# Patient Record
Sex: MALE | Race: ASIAN | NOT HISPANIC OR LATINO | Employment: FULL TIME | URBAN - METROPOLITAN AREA
[De-identification: names, ages, dates, MRNs, and addresses within clinical notes are randomized per-mention and may not be internally consistent; named-entity substitution may affect disease eponyms.]

---

## 2017-02-02 ENCOUNTER — GENERIC CONVERSION - ENCOUNTER (OUTPATIENT)
Dept: OTHER | Facility: OTHER | Age: 47
End: 2017-02-02

## 2017-09-02 ENCOUNTER — APPOINTMENT (OUTPATIENT)
Dept: LAB | Facility: CLINIC | Age: 47
End: 2017-09-02
Payer: COMMERCIAL

## 2017-09-02 ENCOUNTER — TRANSCRIBE ORDERS (OUTPATIENT)
Dept: LAB | Facility: CLINIC | Age: 47
End: 2017-09-02

## 2017-09-02 DIAGNOSIS — E78.00 PURE HYPERCHOLESTEROLEMIA: ICD-10-CM

## 2017-09-02 DIAGNOSIS — L50.8 OTHER SPECIFIED URTICARIA: ICD-10-CM

## 2017-09-02 DIAGNOSIS — E55.9 UNSPECIFIED VITAMIN D DEFICIENCY: ICD-10-CM

## 2017-09-02 DIAGNOSIS — O24.319 PRE-EXISTING DIABETES MELLITUS, TREATED WITH ORAL HYPOGLYCEMIC THERAPY, DURING PREGNANCY: ICD-10-CM

## 2017-09-02 DIAGNOSIS — Z13.29 SCREENING FOR THYROID DISORDER: ICD-10-CM

## 2017-09-02 DIAGNOSIS — Z79.4 ENCOUNTER FOR LONG-TERM (CURRENT) USE OF INSULIN (HCC): ICD-10-CM

## 2017-09-02 DIAGNOSIS — Z79.84 PRE-EXISTING DIABETES MELLITUS, TREATED WITH ORAL HYPOGLYCEMIC THERAPY, DURING PREGNANCY: ICD-10-CM

## 2017-09-02 DIAGNOSIS — E11.9 DIABETES MELLITUS WITHOUT COMPLICATION (HCC): ICD-10-CM

## 2017-09-02 DIAGNOSIS — L50.8 OTHER SPECIFIED URTICARIA: Primary | ICD-10-CM

## 2017-09-02 DIAGNOSIS — E53.8 OTHER B-COMPLEX DEFICIENCIES: ICD-10-CM

## 2017-09-02 DIAGNOSIS — Z12.5 SPECIAL SCREENING FOR MALIGNANT NEOPLASM OF PROSTATE: ICD-10-CM

## 2017-09-02 LAB
25(OH)D3 SERPL-MCNC: 5.7 NG/ML (ref 30–100)
ALBUMIN SERPL BCP-MCNC: 4.1 G/DL (ref 3.5–5)
ALP SERPL-CCNC: 102 U/L (ref 46–116)
ALT SERPL W P-5'-P-CCNC: 97 U/L (ref 12–78)
ANION GAP SERPL CALCULATED.3IONS-SCNC: 12 MMOL/L (ref 4–13)
AST SERPL W P-5'-P-CCNC: 43 U/L (ref 5–45)
BILIRUB SERPL-MCNC: 0.5 MG/DL (ref 0.2–1)
BUN SERPL-MCNC: 9 MG/DL (ref 5–25)
C3 SERPL-MCNC: 152 MG/DL (ref 90–180)
C4 SERPL-MCNC: 34 MG/DL (ref 10–40)
CALCIUM SERPL-MCNC: 9.1 MG/DL (ref 8.3–10.1)
CHLORIDE SERPL-SCNC: 100 MMOL/L (ref 100–108)
CHOLEST SERPL-MCNC: 210 MG/DL (ref 50–200)
CO2 SERPL-SCNC: 27 MMOL/L (ref 21–32)
CREAT SERPL-MCNC: 0.81 MG/DL (ref 0.6–1.3)
CREAT UR-MCNC: 180 MG/DL
EST. AVERAGE GLUCOSE BLD GHB EST-MCNC: 197 MG/DL
GFR SERPL CREATININE-BSD FRML MDRD: 106 ML/MIN/1.73SQ M
GLUCOSE P FAST SERPL-MCNC: 176 MG/DL (ref 65–99)
HBA1C MFR BLD: 8.5 % (ref 4.2–6.3)
HDLC SERPL-MCNC: 41 MG/DL (ref 40–60)
LDLC SERPL CALC-MCNC: 137 MG/DL (ref 0–100)
MICROALBUMIN UR-MCNC: 184 MG/L (ref 0–20)
MICROALBUMIN/CREAT 24H UR: 102 MG/G CREATININE (ref 0–30)
POTASSIUM SERPL-SCNC: 3.8 MMOL/L (ref 3.5–5.3)
PROT SERPL-MCNC: 7.6 G/DL (ref 6.4–8.2)
PSA SERPL-MCNC: 0.2 NG/ML (ref 0–4)
SODIUM SERPL-SCNC: 139 MMOL/L (ref 136–145)
T3FREE SERPL-MCNC: 2.86 PG/ML (ref 2.3–4.2)
T4 FREE SERPL-MCNC: 1.19 NG/DL (ref 0.76–1.46)
T4 SERPL-MCNC: 10.6 UG/DL (ref 4.7–13.3)
TRIGL SERPL-MCNC: 162 MG/DL
TSH SERPL DL<=0.05 MIU/L-ACNC: 1.68 UIU/ML (ref 0.36–3.74)
VIT B12 SERPL-MCNC: 539 PG/ML (ref 100–900)

## 2017-09-02 PROCEDURE — 82306 VITAMIN D 25 HYDROXY: CPT

## 2017-09-02 PROCEDURE — 82043 UR ALBUMIN QUANTITATIVE: CPT | Performed by: FAMILY MEDICINE

## 2017-09-02 PROCEDURE — 86038 ANTINUCLEAR ANTIBODIES: CPT

## 2017-09-02 PROCEDURE — 80053 COMPREHEN METABOLIC PANEL: CPT

## 2017-09-02 PROCEDURE — 86200 CCP ANTIBODY: CPT

## 2017-09-02 PROCEDURE — 84439 ASSAY OF FREE THYROXINE: CPT

## 2017-09-02 PROCEDURE — 86337 INSULIN ANTIBODIES: CPT

## 2017-09-02 PROCEDURE — 82607 VITAMIN B-12: CPT

## 2017-09-02 PROCEDURE — 83516 IMMUNOASSAY NONANTIBODY: CPT

## 2017-09-02 PROCEDURE — 84445 ASSAY OF TSI GLOBULIN: CPT

## 2017-09-02 PROCEDURE — 86255 FLUORESCENT ANTIBODY SCREEN: CPT

## 2017-09-02 PROCEDURE — 86162 COMPLEMENT TOTAL (CH50): CPT

## 2017-09-02 PROCEDURE — 83036 HEMOGLOBIN GLYCOSYLATED A1C: CPT

## 2017-09-02 PROCEDURE — 83519 RIA NONANTIBODY: CPT

## 2017-09-02 PROCEDURE — G0103 PSA SCREENING: HCPCS

## 2017-09-02 PROCEDURE — 86341 ISLET CELL ANTIBODY: CPT

## 2017-09-02 PROCEDURE — 82784 ASSAY IGA/IGD/IGG/IGM EACH: CPT

## 2017-09-02 PROCEDURE — 84432 ASSAY OF THYROGLOBULIN: CPT

## 2017-09-02 PROCEDURE — 36415 COLL VENOUS BLD VENIPUNCTURE: CPT

## 2017-09-02 PROCEDURE — 84481 FREE ASSAY (FT-3): CPT

## 2017-09-02 PROCEDURE — 84165 PROTEIN E-PHORESIS SERUM: CPT

## 2017-09-02 PROCEDURE — 86800 THYROGLOBULIN ANTIBODY: CPT

## 2017-09-02 PROCEDURE — 82570 ASSAY OF URINE CREATININE: CPT | Performed by: FAMILY MEDICINE

## 2017-09-02 PROCEDURE — 86430 RHEUMATOID FACTOR TEST QUAL: CPT

## 2017-09-02 PROCEDURE — 80061 LIPID PANEL: CPT

## 2017-09-02 PROCEDURE — 86160 COMPLEMENT ANTIGEN: CPT

## 2017-09-02 PROCEDURE — 84443 ASSAY THYROID STIM HORMONE: CPT

## 2017-09-02 PROCEDURE — 86671 FUNGUS NES ANTIBODY: CPT

## 2017-09-02 PROCEDURE — 86376 MICROSOMAL ANTIBODY EACH: CPT

## 2017-09-03 LAB — THYROPEROXIDASE AB SERPL-ACNC: 17 IU/ML (ref 0–34)

## 2017-09-04 LAB
CCP IGA+IGG SERPL IA-ACNC: 6 UNITS (ref 0–19)
RYE IGE QN: NEGATIVE

## 2017-09-05 LAB
ALBUMIN SERPL ELPH-MCNC: 4.62 G/DL (ref 3.5–5)
ALBUMIN SERPL ELPH-MCNC: 65.1 % (ref 52–65)
ALPHA1 GLOB SERPL ELPH-MCNC: 0.24 G/DL (ref 0.1–0.4)
ALPHA1 GLOB SERPL ELPH-MCNC: 3.4 % (ref 2.5–5)
ALPHA2 GLOB SERPL ELPH-MCNC: 0.73 G/DL (ref 0.4–1.2)
ALPHA2 GLOB SERPL ELPH-MCNC: 10.3 % (ref 7–13)
BAKER'S YEAST IGG QN IA: 42 UNITS (ref 0–50)
BETA GLOB ABNORMAL SERPL ELPH-MCNC: 0.47 G/DL (ref 0.4–0.8)
BETA1 GLOB SERPL ELPH-MCNC: 6.6 % (ref 5–13)
BETA2 GLOB SERPL ELPH-MCNC: 5 % (ref 2–8)
BETA2+GAMMA GLOB SERPL ELPH-MCNC: 0.36 G/DL (ref 0.2–0.5)
CH50 SERPL-ACNC: 63 U/ML (ref 42–60)
CHITOBIOSIDE IGA SERPL IA-ACNC: 18 UNITS (ref 0–90)
ENDOMYSIUM IGA SER QL: NEGATIVE
GAMMA GLOB ABNORMAL SERPL ELPH-MCNC: 0.68 G/DL (ref 0.5–1.6)
GAMMA GLOB SERPL ELPH-MCNC: 9.6 % (ref 12–22)
GLIADIN PEPTIDE IGA SER-ACNC: 7 UNITS (ref 0–19)
GLIADIN PEPTIDE IGG SER-ACNC: 3 UNITS (ref 0–19)
IBD COMMENTS: ABNORMAL
IGA SERPL-MCNC: 118 MG/DL (ref 90–386)
IGG/ALB SER: 1.87 {RATIO} (ref 1.1–1.8)
LAMINARIBIOSIDE IGG SERPL IA-ACNC: 3 UNITS (ref 0–60)
MANNOBIOSIDE IGG SERPL IA-ACNC: 18 UNITS (ref 0–100)
P-ANCA ATYPICAL SER QL IF: POSITIVE
PROT PATTERN SERPL ELPH-IMP: ABNORMAL
PROT SERPL-MCNC: 7.1 G/DL (ref 6.4–8.2)
RHEUMATOID FACT SER QL LA: NEGATIVE
TTG IGA SER-ACNC: <2 U/ML (ref 0–3)
TTG IGG SER-ACNC: 4 U/ML (ref 0–5)

## 2017-09-06 LAB
GAD65 AB SER-ACNC: <5 U/ML (ref 0–5)
PANC ISLET CELL AB TITR SER: NEGATIVE {TITER}
THYROGLOB AB SERPL-ACNC: <1 IU/ML (ref 0–0.9)
THYROGLOB SERPL-MCNC: 10.5 NG/ML (ref 1.4–29.2)

## 2017-09-07 LAB — TSI SER-ACNC: 42 % (ref 0–139)

## 2017-09-10 LAB — INSULIN AB SER-ACNC: <5 UU/ML

## 2017-09-12 ENCOUNTER — GENERIC CONVERSION - ENCOUNTER (OUTPATIENT)
Dept: ENDOCRINOLOGY | Facility: CLINIC | Age: 47
End: 2017-09-12

## 2017-12-05 ENCOUNTER — GENERIC CONVERSION - ENCOUNTER (OUTPATIENT)
Dept: ENDOCRINOLOGY | Facility: CLINIC | Age: 47
End: 2017-12-05

## 2017-12-27 ENCOUNTER — GENERIC CONVERSION - ENCOUNTER (OUTPATIENT)
Dept: ENDOCRINOLOGY | Facility: CLINIC | Age: 47
End: 2017-12-27

## 2019-05-24 ENCOUNTER — OFFICE VISIT (OUTPATIENT)
Dept: URGENT CARE | Facility: CLINIC | Age: 49
End: 2019-05-24
Payer: COMMERCIAL

## 2019-05-24 VITALS
DIASTOLIC BLOOD PRESSURE: 71 MMHG | TEMPERATURE: 98 F | SYSTOLIC BLOOD PRESSURE: 136 MMHG | WEIGHT: 197 LBS | BODY MASS INDEX: 29.18 KG/M2 | HEIGHT: 69 IN | HEART RATE: 98 BPM | RESPIRATION RATE: 14 BRPM | OXYGEN SATURATION: 95 %

## 2019-05-24 DIAGNOSIS — J32.9 RHINOSINUSITIS: Primary | ICD-10-CM

## 2019-05-24 DIAGNOSIS — J06.9 VIRAL UPPER RESPIRATORY TRACT INFECTION: ICD-10-CM

## 2019-05-24 DIAGNOSIS — J31.0 RHINOSINUSITIS: Primary | ICD-10-CM

## 2019-05-24 PROCEDURE — 99213 OFFICE O/P EST LOW 20 MIN: CPT | Performed by: FAMILY MEDICINE

## 2019-05-24 RX ORDER — FLUTICASONE PROPIONATE 50 MCG
2 SPRAY, SUSPENSION (ML) NASAL DAILY
Qty: 1 BOTTLE | Refills: 1 | Status: SHIPPED | OUTPATIENT
Start: 2019-05-24 | End: 2020-07-13

## 2019-05-24 RX ORDER — BROMPHENIRAMINE MALEATE, PSEUDOEPHEDRINE HYDROCHLORIDE, AND DEXTROMETHORPHAN HYDROBROMIDE 2; 30; 10 MG/5ML; MG/5ML; MG/5ML
10 SYRUP ORAL 4 TIMES DAILY PRN
Qty: 118 ML | Refills: 0 | Status: SHIPPED | OUTPATIENT
Start: 2019-05-24 | End: 2020-11-02 | Stop reason: ALTCHOICE

## 2019-06-05 ENCOUNTER — OFFICE VISIT (OUTPATIENT)
Dept: URGENT CARE | Facility: CLINIC | Age: 49
End: 2019-06-05
Payer: COMMERCIAL

## 2019-06-05 VITALS
HEIGHT: 69 IN | TEMPERATURE: 97.7 F | RESPIRATION RATE: 16 BRPM | BODY MASS INDEX: 29.18 KG/M2 | WEIGHT: 197 LBS | DIASTOLIC BLOOD PRESSURE: 71 MMHG | HEART RATE: 82 BPM | SYSTOLIC BLOOD PRESSURE: 134 MMHG

## 2019-06-05 DIAGNOSIS — L50.9 HIVES: Primary | ICD-10-CM

## 2019-06-05 PROCEDURE — 99213 OFFICE O/P EST LOW 20 MIN: CPT | Performed by: NURSE PRACTITIONER

## 2019-06-05 RX ORDER — NIACIN 1000 MG/1
1000 TABLET, EXTENDED RELEASE ORAL
Refills: 3 | COMMUNITY
Start: 2019-04-07 | End: 2020-07-13

## 2019-06-05 RX ORDER — SEMAGLUTIDE 1.34 MG/ML
INJECTION, SOLUTION SUBCUTANEOUS
Refills: 3 | COMMUNITY
Start: 2019-05-11 | End: 2020-10-29

## 2019-06-05 RX ORDER — AMLODIPINE BESYLATE 5 MG/1
TABLET ORAL
Refills: 1 | COMMUNITY
Start: 2019-05-16 | End: 2020-07-13

## 2019-06-05 RX ORDER — FLASH GLUCOSE SENSOR
KIT MISCELLANEOUS
Refills: 5 | COMMUNITY
Start: 2019-05-15 | End: 2020-11-03 | Stop reason: SDUPTHER

## 2019-06-05 RX ORDER — HYDROXYZINE HYDROCHLORIDE 25 MG/1
25 TABLET, FILM COATED ORAL EVERY 6 HOURS PRN
Qty: 10 TABLET | Refills: 0 | Status: SHIPPED | OUTPATIENT
Start: 2019-06-05 | End: 2020-11-03 | Stop reason: ALTCHOICE

## 2019-06-05 RX ORDER — AZITHROMYCIN 250 MG/1
TABLET, FILM COATED ORAL
COMMUNITY
Start: 2019-06-05 | End: 2020-11-03 | Stop reason: ALTCHOICE

## 2019-06-05 RX ORDER — PREDNISONE 10 MG/1
10 TABLET ORAL DAILY
Qty: 21 TABLET | Refills: 0 | Status: SHIPPED | OUTPATIENT
Start: 2019-06-05 | End: 2020-07-13

## 2020-03-01 ENCOUNTER — APPOINTMENT (EMERGENCY)
Dept: RADIOLOGY | Facility: HOSPITAL | Age: 50
End: 2020-03-01
Payer: COMMERCIAL

## 2020-03-01 ENCOUNTER — HOSPITAL ENCOUNTER (EMERGENCY)
Facility: HOSPITAL | Age: 50
Discharge: HOME | End: 2020-03-01
Attending: EMERGENCY MEDICINE | Admitting: EMERGENCY MEDICINE
Payer: COMMERCIAL

## 2020-03-01 VITALS
SYSTOLIC BLOOD PRESSURE: 138 MMHG | HEART RATE: 85 BPM | BODY MASS INDEX: 31.16 KG/M2 | TEMPERATURE: 98 F | RESPIRATION RATE: 18 BRPM | WEIGHT: 210.98 LBS | DIASTOLIC BLOOD PRESSURE: 74 MMHG | OXYGEN SATURATION: 95 %

## 2020-03-01 DIAGNOSIS — M25.521 RIGHT ELBOW PAIN: Primary | ICD-10-CM

## 2020-03-01 PROCEDURE — 99284 EMERGENCY DEPT VISIT MOD MDM: CPT | Performed by: PHYSICIAN ASSISTANT

## 2020-03-01 PROCEDURE — 73080 X-RAY EXAM OF ELBOW: CPT

## 2020-03-01 PROCEDURE — 99283 EMERGENCY DEPT VISIT LOW MDM: CPT

## 2020-03-01 RX ORDER — TRAMADOL HYDROCHLORIDE 50 MG/1
50 TABLET ORAL ONCE
Status: COMPLETED | OUTPATIENT
Start: 2020-03-01 | End: 2020-03-01

## 2020-03-01 RX ORDER — TRAMADOL HYDROCHLORIDE 50 MG/1
50 TABLET ORAL EVERY 6 HOURS PRN
Qty: 8 TABLET | Refills: 0 | Status: SHIPPED | OUTPATIENT
Start: 2020-03-01 | End: 2020-06-22

## 2020-03-01 RX ADMIN — TRAMADOL HYDROCHLORIDE 50 MG: 50 TABLET ORAL at 09:01

## 2020-03-01 NOTE — ED PROVIDER NOTES
History  Chief Complaint   Patient presents with    Elbow Pain     pain and swelling of right elbow X 3-4 days, denies injury       History provided by:  Patient  Elbow Pain   Location:  Elbow  Elbow location:  R elbow  Injury: no    Pain details:     Quality:  Aching    Radiates to:  Does not radiate    Severity:  Moderate    Onset quality:  Gradual    Duration:  3 days    Timing:  Intermittent    Progression:  Worsening  Handedness:  Right-handed  Dislocation: no    Prior injury to area:  No  Relieved by:  None tried  Worsened by: Movement (Palpation)  Ineffective treatments:  None tried  Associated symptoms: stiffness    Associated symptoms: no back pain, no decreased range of motion, no fatigue, no fever, no muscle weakness, no neck pain, no numbness, no swelling and no tingling    Risk factors: no concern for non-accidental trauma, no known bone disorder, no frequent fractures and no recent illness        Prior to Admission Medications   Prescriptions Last Dose Informant Patient Reported? Taking? Continuous Blood Gluc Sensor ([employer] ASHER 14 DAY SENSOR) [facility]  Spouse/Significant Other Yes No   Sig: Use as directed   OZEMPIC 1 MG/DOSE SOPN  Spouse/Significant Other Yes No   Sig: SUBCUTANEOUS 1MG SUBCUTANEOUS ONCE A WEEK  Omalizumab (XOLAIR SC)  Spouse/Significant Other Yes No   Sig: Inject under the skin  amLODIPine (NORVASC) 5 mg tablet  Spouse/Significant Other Yes No   Sig: TAKE 1 TABLET(S) EVERY DAY BY ORAL ROUTE  (FILLABLE 10/29   atorvastatin (LIPITOR) 40 mg tablet  Spouse/Significant Other Yes No   Sig: Take 40 mg by mouth daily     azithromycin (ZITHROMAX) 250 mg tablet  Spouse/Significant Other Yes No   brompheniramine-pseudoephedrine-DM 30-2-10 MG/5ML syrup  Spouse/Significant Other No No   Sig: Take 10 mL by mouth 4 (four) times a day as needed for congestion or cough   Patient not taking: Reported on 6/5/2019   cetirizine (ZyrTEC) 5 MG tablet  Spouse/Significant Other Yes No   Sig: Take 40 mg by mouth daily  fexofenadine (ALLEGRA) 180 MG tablet  Spouse/Significant Other Yes No   Sig: Take 180 mg by mouth daily  fluticasone (FLONASE) 50 mcg/act nasal spray  Spouse/Significant Other No No   Si sprays into each nostril daily   hydrOXYzine HCL (ATARAX) 25 mg tablet   No No   Sig: Take 1 tablet (25 mg total) by mouth every 6 (six) hours as needed for itching   insulin aspart (NovoLOG) 100 units/mL injection  Spouse/Significant Other Yes No   Sig: Inject 16 Units under the skin 3 (three) times a day before meals  insulin glargine (LANTUS) 100 units/mL subcutaneous injection  Spouse/Significant Other Yes No   Sig: Inject 55 Units under the skin daily at bedtime  metFORMIN (GLUCOPHAGE) 1000 MG tablet  Spouse/Significant Other Yes No   Sig: Take 1,000 mg by mouth 2 (two) times a day with meals  multivitamin (THERAGRAN) TABS  Spouse/Significant Other Yes No   Sig: Take 1 tablet by mouth daily  niacin (NIASPAN) 1000 MG CR tablet  Spouse/Significant Other Yes No   Sig: Take 1,000 mg by mouth daily at bedtime   predniSONE 10 mg tablet   No No   Sig: Take 1 tablet (10 mg total) by mouth daily 60mg days 1, 50mg day 2, 40mg day 3, 30mg day 4, 20 mg day 5, 10mg day 6  ranitidine (ZANTAC) 300 MG capsule  Spouse/Significant Other Yes No   Sig: Take 300 mg by mouth every evening  varenicline (CHANTIX) 0 5 mg tablet  Spouse/Significant Other Yes No   Sig: Take 0 5 mg by mouth as needed for smoking cessation  Facility-Administered Medications: None       Past Medical History:   Diagnosis Date    Diabetes mellitus ([address] )     Hives     Hyperlipidemia        Past Surgical History:   Procedure Laterality Date    VEIN LIGATION AND STRIPPING Bilateral        History reviewed  No pertinent family history  I have reviewed and agree with the history as documented      E-Cigarette/Vaping    E-Cigarette Use Never User      E-Cigarette/Vaping Substances     Social History     Tobacco Use    Smoking status: Current Every Day Smoker    Smokeless tobacco: Never Used   Substance Use Topics    Alcohol use: No    Drug use: No       Review of Systems   Constitutional: Positive for activity change  Negative for chills, fatigue and fever  HENT: Negative for congestion and sore throat  Respiratory: Negative for cough  Gastrointestinal: Negative for diarrhea, nausea and vomiting  Genitourinary: Negative for dysuria, frequency and urgency  Musculoskeletal: Positive for arthralgias and stiffness  Negative for back pain and neck pain  Skin: Negative for color change, pallor, rash and wound  All other systems reviewed and are negative  Physical Exam  Physical Exam   Constitutional: He is oriented to person, place, and time  He appears well-developed and well-nourished  No distress  HENT:   Head: Normocephalic and atraumatic  Eyes: Conjunctivae are normal  Right eye exhibits no discharge  Left eye exhibits no discharge  Pulmonary/Chest: Effort normal    Musculoskeletal:   Examination of the right elbow-it is atraumatic upon inspection  There is tenderness palpated over the olecranon process  There is no swelling or evidence of bursitis  There is no crepitance with range of motion  Patient does complain of increased pain with full flexion  He has full range of motion in all planes  There is full range of motion of the right shoulder and wrist   Motor and sensation are present to the ulnar, Radial, median distribution of the right upper extremity  Neurological: He is alert and oriented to person, place, and time  Skin: Skin is warm  He is not diaphoretic  Nursing note and vitals reviewed        Vital Signs  ED Triage Vitals   Temperature Pulse Respirations Blood Pressure SpO2   03/01/20 0820 03/01/20 0815 03/01/20 0815 03/01/20 0815 03/01/20 0815   98 °F (36 7 °C) 85 18 138/74 95 %      Temp Source Heart Rate Source Patient Position - Orthostatic VS BP Location FiO2 (%)   03/01/20 0820 -- 03/01/20 0815 03/01/20 0815 --   Oral  Lying Left arm       Pain Score       03/01/20 0815       Worst Possible Pain           Vitals:    03/01/20 0815   BP: 138/74   Pulse: 85   Patient Position - Orthostatic VS: Lying         Visual Acuity      ED Medications  Medications   traMADol (ULTRAM) tablet 50 mg (50 mg Oral Given 3/1/20 0901)       Diagnostic Studies  Results Reviewed     None                 XR elbow 3+ vw RIGHT   ED Interpretation by Aleida White PA-C (03/01 4570)   Olecranon spur, mild degenerative changes  Procedures  Procedures         ED Course                               MDM  Number of Diagnoses or Management Options  Right elbow pain: new and requires workup     Amount and/or Complexity of Data Reviewed  Tests in the radiology section of CPT®: ordered and reviewed  Tests in the medicine section of CPT®: ordered and reviewed    Risk of Complications, Morbidity, and/or Mortality  Presenting problems: low  Diagnostic procedures: low  Management options: low  General comments: Patient presents emergency room with right elbow pain  He denies any injury  He was seen and examined  X-rays were taken which demonstrated a spur off of his olecranon process which is where he is tender  He was given a referral for Orthopedics  He was given a prescription for anti-inflammatory medications to take as needed with for pain with food  He will call and arrange a follow-up appointment  He was given reasons to return to the emergency room should his symptoms worsen      Patient Progress  Patient progress: stable        Disposition  Final diagnoses:   Right elbow pain - Olecranon spur/Mild DJD     Time reflects when diagnosis was documented in both MDM as applicable and the Disposition within this note     Time User Action Codes Description Comment    3/1/2020  9:30 AM Juan Webster Add [A63 117] Right elbow pain     3/1/2020  9:30 AM Juan Webster Modify [M25 521] Right elbow pain Olecranon spur    3/1/2020  9:31 AM Natalia Beltran Modify [M25 521] Right elbow pain Olecranon spur/Mild DJD      ED Disposition     ED Disposition Condition Date/Time Comment    Discharge Stable Sun Mar 1, 2020  9:30 AM Corby Joanne discharge to home/self care  Follow-up Information     Follow up With Specialties Details Why Contact Info    Arian Chaparro MD Orthopedic Surgery Schedule an appointment as soon as possible for a visit   [address] [place] [address]  [phone]            Discharge Medication List as of 3/1/2020  9:34 AM      START taking these medications    Details   traMADol (ULTRAM) 50 mg tablet Take 1 tablet (50 mg total) by mouth every 6 (six) hours as needed for moderate pain for up to 8 doses, Starting Sun 3/1/2020, Normal         CONTINUE these medications which have NOT CHANGED    Details   amLODIPine (NORVASC) 5 mg tablet TAKE 1 TABLET(S) EVERY DAY BY ORAL ROUTE  (FILLABLE 10/29, Historical Med      atorvastatin (LIPITOR) 40 mg tablet Take 40 mg by mouth daily  , Historical Med      azithromycin (ZITHROMAX) 250 mg tablet Starting Wed 6/5/2019, Historical Med      brompheniramine-pseudoephedrine-DM 30-2-10 MG/5ML syrup Take 10 mL by mouth 4 (four) times a day as needed for congestion or cough, Starting Fri 5/24/2019, Normal      cetirizine (ZyrTEC) 5 MG tablet Take 40 mg by mouth daily  , Historical Med      Continuous Blood Gluc Sensor (FREESTYLE ASHER 14 DAY SENSOR) MISC Use as directed, Starting Wed 5/15/2019, Historical Med      fexofenadine (ALLEGRA) 180 MG tablet Take 180 mg by mouth daily  , Historical Med      fluticasone (FLONASE) 50 mcg/act nasal spray 2 sprays into each nostril daily, Starting Fri 5/24/2019, Normal      hydrOXYzine HCL (ATARAX) 25 mg tablet Take 1 tablet (25 mg total) by mouth every 6 (six) hours as needed for itching, Starting Wed 6/5/2019, Normal      insulin aspart (NovoLOG) 100 units/mL injection Inject 16 Units under the skin 3 (three) times a day before meals  , Historical Med      insulin glargine (LANTUS) 100 units/mL subcutaneous injection Inject 55 Units under the skin daily at bedtime  , Historical Med      metFORMIN (GLUCOPHAGE) 1000 MG tablet Take 1,000 mg by mouth 2 (two) times a day with meals  , Historical Med      multivitamin (THERAGRAN) TABS Take 1 tablet by mouth daily  , Historical Med      niacin (NIASPAN) 1000 MG CR tablet Take 1,000 mg by mouth daily at bedtime, Starting Sun 4/7/2019, Historical Med      Omalizumab (Velna Seal SC) Inject under the skin , Historical Med      OZEMPIC 1 MG/DOSE SOPN SUBCUTANEOUS 1MG SUBCUTANEOUS ONCE A WEEK , Historical Med      predniSONE 10 mg tablet Take 1 tablet (10 mg total) by mouth daily 60mg days 1, 50mg day 2, 40mg day 3, 30mg day 4, 20 mg day 5, 10mg day 6 , Starting Wed 6/5/2019, Normal      ranitidine (ZANTAC) 300 MG capsule Take 300 mg by mouth every evening , Historical Med      varenicline (CHANTIX) 0 5 mg tablet Take 0 5 mg by mouth as needed for smoking cessation  , Historical Med           No discharge procedures on file      PDMP Review     None          ED Provider  Electronically Signed by           Charlette Gallegos PA-C  03/01/20 0382

## 2020-03-01 NOTE — ED NOTES
Reviewed and agree with Theodor Groom student nurse assessment        Carlitos Davila RN  03/01/20 1751

## 2020-06-03 ENCOUNTER — TRANSCRIBE ORDERS (OUTPATIENT)
Dept: LAB | Facility: CLINIC | Age: 50
End: 2020-06-03

## 2020-06-03 ENCOUNTER — APPOINTMENT (OUTPATIENT)
Dept: LAB | Facility: CLINIC | Age: 50
End: 2020-06-03
Payer: COMMERCIAL

## 2020-06-03 DIAGNOSIS — Z79.4 ENCOUNTER FOR LONG-TERM (CURRENT) USE OF INSULIN (HCC): ICD-10-CM

## 2020-06-03 DIAGNOSIS — Z79.4 ENCOUNTER FOR LONG-TERM (CURRENT) USE OF INSULIN (HCC): Primary | ICD-10-CM

## 2020-06-03 LAB
25(OH)D3 SERPL-MCNC: 66.1 NG/ML (ref 30–100)
ALBUMIN SERPL BCP-MCNC: 3.7 G/DL (ref 3.5–5)
ALP SERPL-CCNC: 73 U/L (ref 46–116)
ALT SERPL W P-5'-P-CCNC: 47 U/L (ref 12–78)
ANION GAP SERPL CALCULATED.3IONS-SCNC: 5 MMOL/L (ref 4–13)
AST SERPL W P-5'-P-CCNC: 28 U/L (ref 5–45)
BILIRUB SERPL-MCNC: 0.8 MG/DL (ref 0.2–1)
BUN SERPL-MCNC: 11 MG/DL (ref 5–25)
CALCIUM SERPL-MCNC: 8.3 MG/DL (ref 8.3–10.1)
CHLORIDE SERPL-SCNC: 106 MMOL/L (ref 100–108)
CHOLEST SERPL-MCNC: 100 MG/DL (ref 50–200)
CO2 SERPL-SCNC: 27 MMOL/L (ref 21–32)
CREAT SERPL-MCNC: 0.78 MG/DL (ref 0.6–1.3)
EST. AVERAGE GLUCOSE BLD GHB EST-MCNC: 131 MG/DL
GFR SERPL CREATININE-BSD FRML MDRD: 105 ML/MIN/1.73SQ M
GLUCOSE P FAST SERPL-MCNC: 98 MG/DL (ref 65–99)
HBA1C MFR BLD: 6.2 %
HDLC SERPL-MCNC: 36 MG/DL
LDLC SERPL CALC-MCNC: 53 MG/DL (ref 0–100)
NONHDLC SERPL-MCNC: 64 MG/DL
POTASSIUM SERPL-SCNC: 4.2 MMOL/L (ref 3.5–5.3)
PROT SERPL-MCNC: 6.6 G/DL (ref 6.4–8.2)
SODIUM SERPL-SCNC: 138 MMOL/L (ref 136–145)
TRIGL SERPL-MCNC: 57 MG/DL
TSH SERPL DL<=0.05 MIU/L-ACNC: 1.65 UIU/ML (ref 0.36–3.74)

## 2020-06-03 PROCEDURE — 83036 HEMOGLOBIN GLYCOSYLATED A1C: CPT

## 2020-06-03 PROCEDURE — 82306 VITAMIN D 25 HYDROXY: CPT

## 2020-06-03 PROCEDURE — 80061 LIPID PANEL: CPT

## 2020-06-03 PROCEDURE — 84443 ASSAY THYROID STIM HORMONE: CPT

## 2020-06-03 PROCEDURE — 84403 ASSAY OF TOTAL TESTOSTERONE: CPT

## 2020-06-03 PROCEDURE — 36415 COLL VENOUS BLD VENIPUNCTURE: CPT

## 2020-06-03 PROCEDURE — 84402 ASSAY OF FREE TESTOSTERONE: CPT

## 2020-06-03 PROCEDURE — 80053 COMPREHEN METABOLIC PANEL: CPT

## 2020-06-04 LAB
TESTOST FREE SERPL-MCNC: 8.5 PG/ML (ref 7.2–24)
TESTOST SERPL-MCNC: 308 NG/DL (ref 264–916)

## 2020-06-15 ENCOUNTER — TELEPHONE (OUTPATIENT)
Dept: FAMILY MEDICINE CLINIC | Facility: CLINIC | Age: 50
End: 2020-06-15

## 2020-06-16 DIAGNOSIS — M79.603 PAIN OF UPPER EXTREMITY, UNSPECIFIED LATERALITY: Primary | ICD-10-CM

## 2020-06-18 RX ORDER — EPINEPHRINE 0.3 MG/.3ML
INJECTION SUBCUTANEOUS
COMMUNITY

## 2020-06-18 RX ORDER — ERGOCALCIFEROL 1.25 MG/1
CAPSULE ORAL
COMMUNITY
Start: 2020-05-18 | End: 2022-02-15 | Stop reason: ALTCHOICE

## 2020-06-18 RX ORDER — CEFDINIR 300 MG/1
300 CAPSULE ORAL DAILY
COMMUNITY
End: 2020-11-03 | Stop reason: ALTCHOICE

## 2020-06-22 VITALS
WEIGHT: 202.8 LBS | SYSTOLIC BLOOD PRESSURE: 125 MMHG | HEIGHT: 69 IN | DIASTOLIC BLOOD PRESSURE: 77 MMHG | BODY MASS INDEX: 30.04 KG/M2 | HEART RATE: 85 BPM

## 2020-06-22 DIAGNOSIS — M79.603 PAIN OF UPPER EXTREMITY, UNSPECIFIED LATERALITY: ICD-10-CM

## 2020-06-22 DIAGNOSIS — M54.9 MID BACK PAIN: Primary | ICD-10-CM

## 2020-06-22 DIAGNOSIS — G56.22 CUBITAL TUNNEL SYNDROME ON LEFT: ICD-10-CM

## 2020-06-22 PROCEDURE — 99244 OFF/OP CNSLTJ NEW/EST MOD 40: CPT | Performed by: PHYSICAL MEDICINE & REHABILITATION

## 2020-06-22 RX ORDER — METAXALONE 800 MG/1
800 TABLET ORAL
Qty: 20 TABLET | Refills: 0 | Status: SHIPPED | OUTPATIENT
Start: 2020-06-22 | End: 2020-11-03 | Stop reason: ALTCHOICE

## 2020-07-08 ENCOUNTER — EVALUATION (OUTPATIENT)
Dept: PHYSICAL THERAPY | Facility: CLINIC | Age: 50
End: 2020-07-08
Payer: COMMERCIAL

## 2020-07-08 ENCOUNTER — EVALUATION (OUTPATIENT)
Dept: OCCUPATIONAL THERAPY | Facility: CLINIC | Age: 50
End: 2020-07-08
Payer: COMMERCIAL

## 2020-07-08 DIAGNOSIS — M54.9 MID BACK PAIN: Primary | ICD-10-CM

## 2020-07-08 DIAGNOSIS — G56.22 CUBITAL TUNNEL SYNDROME ON LEFT: Primary | ICD-10-CM

## 2020-07-08 PROCEDURE — L3702 EO W/O JOINTS CF: HCPCS | Performed by: OCCUPATIONAL THERAPIST

## 2020-07-08 PROCEDURE — 97161 PT EVAL LOW COMPLEX 20 MIN: CPT | Performed by: PHYSICAL THERAPIST

## 2020-07-08 PROCEDURE — 97140 MANUAL THERAPY 1/> REGIONS: CPT | Performed by: OCCUPATIONAL THERAPIST

## 2020-07-08 PROCEDURE — 97165 OT EVAL LOW COMPLEX 30 MIN: CPT | Performed by: OCCUPATIONAL THERAPIST

## 2020-07-08 PROCEDURE — 97140 MANUAL THERAPY 1/> REGIONS: CPT | Performed by: PHYSICAL THERAPIST

## 2020-07-08 NOTE — PROGRESS NOTES
OT Evaluation     Today's date: 2020  Patient name: Alex Del Castillo  : 1970  MRN: [id number]  Referring provider: Angle Eason DO  Dx:   Encounter Diagnosis     ICD-10-CM    1  Cubital tunnel syndrome on left G56 22                   Assessment  Assessment details: Marleen is referred for numbness and tingling in his left ring and small fingers that has been ongoing for many years  Despite negative provocative testing, clinical presentation is suggestive for cubital tunnel syndrome  Sensation and strength are not affected at this time  It appears that his symptoms are worsened due to prolonged elbow flexion and positioning  He was fabricated a custom elbow orthotic today for nighttime to reduce elbow flexion and further ulnar nerve compression  He will benefit from ulnar nerve gliding and mobilization to reduce symptoms in addition to education regarding ergonomics and positioning  See below for a detailed assessment  Impairments: abnormal or restricted ROM, impaired physical strength and pain with function    Symptom irritability: lowUnderstanding of Dx/Px/POC: good   Prognosis: good    Goals  STG: Patient will be compliant with nighttime splinting, ergonomic modifications, and home exercise program in 1 week  STG: Subjective reports of numbness and tingling will be reduced in 4 weeks  LTG: Performance in ADLs and IADLS will be improved to prior level of function with the affected extremity within 6 weeks or discharge  LTG: Performance in work activity will be improved to prior level of function with the affected extremity within 6 weeks or discharge  Plan  Plan details: Treatment to include modalities, manual therapy, PRE's, HEP, and orthotics as appropriate     Patient would benefit from: skilled OT, OT eval and custom splinting  Planned modality interventions: thermotherapy: hydrocollator packs  Planned therapy interventions: home exercise program, manual therapy and patient education  Frequency: 2x week  Duration in visits: 10  Plan of Care beginning date: 7/8/2020  Plan of Care expiration date: 9/9/2020  Treatment plan discussed with: patient        Subjective Evaluation    History of Present Illness  Mechanism of injury: He states that he has been experiencing numbness in his left ring and small fingers for many years  His symptoms are worst at night but also consistent during the day  He states that he sleeps with his elbow bent  He denies pain  Subjectively he reports 10/10 numbness in the fingers  Not a recurrent problem   Quality of life: good    Pain  No pain reported    Social Support    Employment status: working (Works for a )  Hand dominance: right    Treatments  Current treatment: occupational therapy  Patient Goals  Patient goal: Decreased numbness and tingling        Objective     Observations     Left Wrist/Hand   Negative for Wartenberg's sign  Tenderness     Left Elbow   No tenderness in the cubital tunnel  Neurological Testing     Sensation     Wrist/Hand   Left   Intact: light touch and static two point discrimination    Comments   Left light touch: 2 83 monofilament   Left static two point discrimination: 3mm ulnar digits    Active Range of Motion     Left Elbow   Normal active range of motion    Strength/Myotome Testing     Left Wrist/Hand      (2nd hand position)     Trial 1: 104  3    Thumb Strength  Key/Lateral Pinch     Trail 1: 19 5    Right Wrist/Hand      (2nd hand position)     Trial 1: 106 8    Thumb Strength   Key/Lateral Pinch     Trial 1: 21    Tests     Left Elbow   Negative elbow flexion and Tinel's sign (cubital tunnel)  Left Wrist/Hand   Positive Froment's sign (Mild)  Negative crossed finger       Additional Tests Details  +ULTT ulnar             Precautions: Universal       Manuals 7/8            EO Fabricated            TIANNA 10'            Cupping PRN             IASTM PRN             Neuro Re-Ed Ther Ex                                                                                                                     Ther Activity                                       Gait Training                                       Modalities

## 2020-07-08 NOTE — PROGRESS NOTES
PT Evaluation     Today's date: 2020  Patient name: Preet Krishnan  : 1970  MRN: [id number]  Referring provider: Michael Simmons DO  Dx:   Encounter Diagnosis     ICD-10-CM    1  Mid back pain M54 9 Ambulatory referral to Physical Therapy                  Assessment  Assessment details: Patient presents with signs and symptoms consistent with referring diagnosis  He presents with R Rib10 dysfunction  His symptoms were resolved with MT including grade 3-5 mobilizations of R Thoracic spine likely caused by aggressive muscle contraction lifting heavy weight on bench press  Positive prognostic indicators include: Motivated to improve, symptoms resolved at treatment todayNegative prognostic indicators include: (-)  He presents with: pain, decreased: ROM, strength and  functional capacity  He requires skilled PT to address these deficits and restore maximal functional capacity  Will transition to HEP as able  Thank you for this pleasant referral        Impairments: abnormal or restricted ROM, activity intolerance, impaired physical strength, lacks appropriate home exercise program and pain with function  Understanding of Dx/Px/POC: good   Prognosis: good    Goals  ST-6 weeks  1  Patient to be independent with HEP  2   Decrease pain at least 2 subjective levels  LT-12 weeks  1    Patient to voice comfort with self management of condition  2   75% or > decreased pain  3   75% or > decreased functional deficits  4   Normalize AROM of all deficit planes          Plan  Patient would benefit from: skilled PT  Referral necessary: No  Planned modality interventions: cryotherapy  Planned therapy interventions: IADL retraining, joint mobilization, manual therapy, motor coordination training, neuromuscular re-education, patient education, postural training, self care, strengthening, stretching, therapeutic activities, therapeutic exercise, home exercise program, flexibility, ADL training, balance and body mechanics training  Frequency: 2x week  Duration in weeks: 6  Treatment plan discussed with: patient        Subjective Evaluation    History of Present Illness  Onset date: 3 months ago  Mechanism of injury: Chief Complaint:  Mid Back Pain    History:  A few monhts ago he felt an onset of R lower lateral thoracic pain when attemoting to lift something heavy at the gym  Symptoms have been unchanged since the onset  He was seen by Dr Elin Avila and referred to PT  He had xrays which were (-)  His work involves sitting and computer work    He denies pain with sitting upright but has pain when "lounging"    PMH: DM II     Aggravating factors: Kids climbing on him, evening, attempts at sleeping, sitting in a back/lounging positoin, R trunk twisting    Relieving factors: Lying down    Functional Deficits: gym workouts    Patient Goals: Fix problem, relieve pain    Quality of life: good    Pain  At best pain ratin  At worst pain rating: 10  Location: R lower lateral thoraciic           Objective     Postural Observations  Seated posture: fair  Standing posture: fair  Correction of posture: has no consistent effect        Active Range of Motion   Cervical/Thoracic Spine       Thoracic    Flexion:  WFL  Extension:  WFL  Left lateral flexion:  with pain Restriction level: minimal  Right lateral flexion:  WFL  Left rotation:  Restriction level: minimal  Right rotation:  [email or web address] [employer]    Joint Play     Hypomobile: 7th rib, 8th rib, 9th rib and 10th rib     Pain: 7th rib, 8th rib, 9th rib and 10th rib     General Comments:      Cervical/Thoracic Comments  UQS WFL  (+) R thoracic closing quadrant testing  All symptoms resolved post MT including PA rib mobilizations Rib 7-10  AROM and quadrant full/pain free ROM             Precautions: DM II      Manuals 7/8            R T7-10 Rib PA mobs Gr 3-5 10                                                   Neuro Re-Ed             Reassess painful movements Ther Ex             HEP 10            B Thoracic Rot             TB Horiz Abd             LTR B             B Th SBending                                                    Ther Activity                                       Gait Training                                       Modalities

## 2020-07-13 ENCOUNTER — OFFICE VISIT (OUTPATIENT)
Dept: ENDOCRINOLOGY | Facility: CLINIC | Age: 50
End: 2020-07-13
Payer: COMMERCIAL

## 2020-07-13 VITALS
HEART RATE: 85 BPM | WEIGHT: 204 LBS | TEMPERATURE: 97 F | DIASTOLIC BLOOD PRESSURE: 77 MMHG | HEIGHT: 69 IN | BODY MASS INDEX: 30.21 KG/M2 | SYSTOLIC BLOOD PRESSURE: 125 MMHG

## 2020-07-13 DIAGNOSIS — E11.65 TYPE 2 DIABETES MELLITUS WITH HYPERGLYCEMIA, WITH LONG-TERM CURRENT USE OF INSULIN (HCC): Primary | ICD-10-CM

## 2020-07-13 DIAGNOSIS — E78.00 PURE HYPERCHOLESTEROLEMIA: ICD-10-CM

## 2020-07-13 DIAGNOSIS — I10 HYPERTENSION GOAL BP (BLOOD PRESSURE) < 140/90: ICD-10-CM

## 2020-07-13 DIAGNOSIS — Z79.4 TYPE 2 DIABETES MELLITUS WITH HYPERGLYCEMIA, WITH LONG-TERM CURRENT USE OF INSULIN (HCC): Primary | ICD-10-CM

## 2020-07-13 PROCEDURE — 99243 OFF/OP CNSLTJ NEW/EST LOW 30: CPT | Performed by: INTERNAL MEDICINE

## 2020-07-13 PROCEDURE — 95251 CONT GLUC MNTR ANALYSIS I&R: CPT | Performed by: INTERNAL MEDICINE

## 2020-07-13 RX ORDER — AMLODIPINE BESYLATE 5 MG/1
5 TABLET ORAL DAILY
Qty: 90 TABLET | Refills: 1 | Status: SHIPPED | OUTPATIENT
Start: 2020-07-13 | End: 2021-01-04

## 2020-07-13 NOTE — PROGRESS NOTES
ENDOCRINOLOGY  NEW PATIENT H&P     ? Reason for Endocrine Consult/Chief Complaint: DM managmeent     Referring Provider: Michael Callejas MD  Consults       Medical Decision Making:     Impression  1  Type 2 Diabetes  2  HTN  3  HLD  4  Hives on Xolair idiopathic urticaria       Recommendations:  ?  I discussed the pathophysiology of DM2 and reviewed therapy options based on ADA 2020 guidelines  He uses the omnipod pump with novolog (older version) at the following settings:  Basal rates:  12AM-6AM 1 15 units/hr  6AM-9AM- 1 2 units/hr  9AM-12PM- 1 3 units/hr  12PM-12AM- 1 25 units/hr  Total daily basal requiremetn 29 4 units/day    IC  12AM-4:30PM- 6  4:30PM-8PM- 7  8PM-12AM- 6    ISF 20    AIT 3 5 hours    BG target 120    Blood sugars are well controlled with no significant hypoglycemia  Will continue same insulin pump settings  Continue ozempic 1mg weekly Sundays  Counseled on adverse SEs including nausea, vomiting, risk of pancreatitis and risk of medullary thyroid CA  No FHx of MEN2  He understood these risk and wished to continue therapy  Continue Synjardy 12 5-1000mg BID AC  Counseled on adverse SEs including urinary frequency, risk of UTI and yeast infection, risk of Tegan's gangrene  He understood these risks and wished to continue therapy  Freestyle Yoly CGM interpretation 7/13/20     CGM worn 72% of time June 30th-July 13th, 2020  Time in range 98% (goal >65-70%)  Hypoglycemia 1%  CV 19 9% (goal <33%)     Average glucose 113 mg/dL    BGs are well controlled and at goal, continue same insulin pump settings  HLD- triglycerides 57, LDL 53 well controlled, I stopped niacin given low triglycerides, continue atorvastatin 20mg qday    HTN- well controlled continue amlodipine 5mg qday    RTC 2 months    Madisyn MCMILLAN  Endocrinology        History of Present Illness:   Mr Margie Coulter is a 47year old male who presents for DM management     ?  PMH-HTN, HLD, DM2, Hives on Xolair idiopathic urtricaria   PSH- vein ligation/stripping  FHx-father with diabetes and brother with diabetes  SHx-10-12 cigarettes a day, works at [employer]      Type of DM: 2  Age of onset: 10 years ago   Most recent A1C: 6 2% June 2020  Present home regimen:  novolog via Omnipod, ozempic 1mg weekly Sundays, Synjardy 12 5-1000mg BID AC  SMBG at home: see CGM report  Hypoglycemic events:at night time occasional- once a month   Microvascular complications:none known   Macrovascular complications:none known   Nutrition: 3 meals a day and a snack- beef vegetable, ramen noodles, omlette and paratha  Exercise: moving lawn                      ? Review of Systems:     Review of Systems   Constitutional: Negative for appetite change, chills, diaphoresis, fatigue, fever and unexpected weight change  HENT: Negative for congestion, ear pain, hearing loss, rhinorrhea, sinus pressure, sinus pain, sore throat, trouble swallowing and voice change  Eyes: Negative for photophobia, redness and visual disturbance  Respiratory: Negative for apnea, cough, chest tightness, shortness of breath, wheezing and stridor  Cardiovascular: Negative for chest pain, palpitations and leg swelling  Gastrointestinal: Negative for abdominal distention, abdominal pain, constipation, diarrhea, nausea and vomiting  Endocrine: Negative for cold intolerance, heat intolerance, polydipsia, polyphagia and polyuria  Genitourinary: Negative for difficulty urinating, dysuria, flank pain, frequency, hematuria and urgency  Musculoskeletal: Negative for arthralgias, back pain, gait problem, joint swelling and myalgias  Skin: Negative for color change, pallor, rash and wound  Allergic/Immunologic: Negative for immunocompromised state  Neurological: Negative for dizziness, tremors, syncope, weakness, light-headedness and headaches  Hematological: Negative for adenopathy  Does not bruise/bleed easily     Psychiatric/Behavioral: Negative for confusion and sleep disturbance  The patient is not nervous/anxious  ?   Patient History:     Past Medical History:   Diagnosis Date    Diabetes mellitus ([facility] [address] )     Hives     Hyperlipidemia     Hypertension      Past Surgical History:   Procedure Laterality Date    COLONOSCOPY  2017    VEIN LIGATION AND STRIPPING Bilateral      Social History     Socioeconomic History    Marital status: /Civil Union     Spouse name: Not on file    Number of children: Not on file    Years of education: Not on file    Highest education level: Not on file   Occupational History    Occupation:    Social Needs    Financial resource strain: Not on file    Food insecurity:     Worry: Not on file     Inability: Not on file   Extended Systems needs:     Medical: Not on file     Non-medical: Not on file   Tobacco Use    Smoking status: Current Every Day Smoker     Packs/day: 0 50    Smokeless tobacco: Never Used   Substance and Sexual Activity    Alcohol use: No    Drug use: No    Sexual activity: Not on file   Lifestyle    Physical activity:     Days per week: Not on file     Minutes per session: Not on file    Stress: Not on file   Relationships    Social connections:     Talks on phone: Not on file     Gets together: Not on file     Attends Sikh service: Not on file     Active member of club or organization: Not on file     Attends meetings of clubs or organizations: Not on file     Relationship status: Not on file    Intimate partner violence:     Fear of current or ex partner: Not on file     Emotionally abused: Not on file     Physically abused: Not on file     Forced sexual activity: Not on file   Other Topics Concern    Not on file   Social History Narrative    Most recent tobacco use screenin2019    Do you currently or have you served in [employer]: No    Were you activated, into active duty, as a member of the [employer] or as a Reservist: No    Occupation:     Marital status: Unknown    Sexual orientation: Heterosexual    Exercise level: Moderate    Diet: Regular    General stress level: Low    Alcohol intake: None    Caffeine intake: Moderate    Chewing tobacco: none    Illicit drugs: no    Guns present in home: No    Seat belts used routinely: Yes    Sunscreen used routinely: Yes    Smoke alarm in home: Yes    Advance directive: No    Salt Intake: yes     Family History   Problem Relation Age of Onset    Diabetes Father     Heart disease Father     Diabetes Brother        Current Medications: At the time this note was written these were the medications the patient was on  Current Outpatient Medications   Medication Sig Dispense Refill    amLODIPine (NORVASC) 5 mg tablet TAKE 1 TABLET(S) EVERY DAY BY ORAL ROUTE  (FILLABLE 10/29  1    atorvastatin (LIPITOR) 40 mg tablet Take 40 mg by mouth daily   azithromycin (ZITHROMAX) 250 mg tablet       brompheniramine-pseudoephedrine-DM 30-2-10 MG/5ML syrup Take 10 mL by mouth 4 (four) times a day as needed for congestion or cough (Patient not taking: Reported on 6/5/2019) 118 mL 0    cefdinir (OMNICEF) 300 mg capsule cefdinir 300 mg capsule      cetirizine (ZyrTEC) 5 MG tablet Take 40 mg by mouth daily   Continuous Blood Gluc Sensor (FREESTYLE ASHER 14 DAY SENSOR) MISC Use as directed  5    Empagliflozin-metFORMIN HCl ER (Synjardy XR) 12 5-1000 MG TB24 Take 12 5-1,000 mg by mouth 2 (two) times a day      EPINEPHrine (EpiPen 2-Neeraj) 0 3 mg/0 3 mL SOAJ EpiPen 2-Neeraj 0 3 mg/0 3 mL injection, auto-injector      ergocalciferol (VITAMIN D2) 50,000 units TAKE 1 CAPSULE WEEKLY WITH DINNER      fexofenadine (ALLEGRA) 180 MG tablet Take 180 mg by mouth daily        fluticasone (FLONASE) 50 mcg/act nasal spray 2 sprays into each nostril daily 1 Bottle 1    hydrOXYzine HCL (ATARAX) 25 mg tablet Take 1 tablet (25 mg total) by mouth every 6 (six) hours as needed for itching 10 tablet 0    insulin aspart (NovoLOG) 100 units/mL injection Inject 16 Units under the skin 3 (three) times a day before meals   insulin glargine (LANTUS) 100 units/mL subcutaneous injection Inject 55 Units under the skin daily at bedtime   metaxalone (SKELAXIN) 800 mg tablet Take 1 tablet (800 mg total) by mouth daily at bedtime as needed for muscle spasms 20 tablet 0    metFORMIN (GLUCOPHAGE) 1000 MG tablet Take 1,000 mg by mouth 2 (two) times a day with meals   multivitamin (THERAGRAN) TABS Take 1 tablet by mouth daily   niacin (NIASPAN) 1000 MG CR tablet Take 1,000 mg by mouth daily at bedtime  3    Omalizumab (XOLAIR SC) Inject under the skin   OZEMPIC 1 MG/DOSE SOPN SUBCUTANEOUS 1MG SUBCUTANEOUS ONCE A WEEK  3    predniSONE 10 mg tablet Take 1 tablet (10 mg total) by mouth daily 60mg days 1, 50mg day 2, 40mg day 3, 30mg day 4, 20 mg day 5, 10mg day 6  21 tablet 0    ranitidine (ZANTAC) 300 MG capsule Take 300 mg by mouth every evening   varenicline (CHANTIX) 0 5 mg tablet Take 0 5 mg by mouth as needed for smoking cessation  No current facility-administered medications for this visit  Allergies: Aspirin; Crestor [rosuvastatin]; Invokana [canagliflozin]; Janumet [sitagliptin-metformin hcl]; and Sitagliptin    Physical Exam:   Vital Signs: There were no vitals taken for this visit  Physical Exam   Constitutional: He is oriented to person, place, and time  He appears well-developed and well-nourished  HENT:   Head: Normocephalic and atraumatic  Right Ear: External ear normal    Left Ear: External ear normal    Nose: Nose normal    Eyes: Pupils are equal, round, and reactive to light  Conjunctivae and EOM are normal  No scleral icterus  Neck: Normal range of motion  Neck supple  No thyromegaly present  Cardiovascular: Normal rate, regular rhythm and normal heart sounds  Exam reveals no gallop and no friction rub  No murmur heard  Pulmonary/Chest: Effort normal and breath sounds normal  No stridor   No respiratory distress  He has no wheezes  He has no rales  Abdominal: Soft  Bowel sounds are normal  He exhibits no distension and no mass  There is no tenderness  There is no rebound and no guarding  Musculoskeletal: Normal range of motion  He exhibits no edema  Lymphadenopathy:     He has no cervical adenopathy  Neurological: He is alert and oriented to person, place, and time  Skin: Skin is warm and dry  No rash noted  No erythema  No pallor  Psychiatric: He has a normal mood and affect  His behavior is normal  Judgment and thought content normal         Labs and Imaging:      ? Component      Latest Ref Rng & Units 6/3/2020   Sodium      136 - 145 mmol/L 138   Potassium      3 5 - 5 3 mmol/L 4 2   Chloride      100 - 108 mmol/L 106   CO2      21 - 32 mmol/L 27   Anion Gap      4 - 13 mmol/L 5   BUN      5 - 25 mg/dL 11   Creatinine      0 60 - 1 30 mg/dL 0 78   GLUCOSE FASTING      65 - 99 mg/dL 98   Calcium      8 3 - 10 1 mg/dL 8 3   AST      5 - 45 U/L 28   ALT      12 - 78 U/L 47   Alkaline Phosphatase      46 - 116 U/L 73   Total Protein      6 4 - 8 2 g/dL 6 6   Albumin      3 5 - 5 0 g/dL 3 7   TOTAL BILIRUBIN      0 20 - 1 00 mg/dL 0 80   eGFR      ml/min/1 73sq m 105   Cholesterol      50 - 200 mg/dL 100   Triglycerides      <=150 mg/dL 57   HDL      >=40 mg/dL 36 (L)   LDL Calculated      0 - 100 mg/dL 53   Non-HDL Cholesterol      mg/dl 64   Hemoglobin A1C      Normal 3 8-5 6%; PreDiabetic 5 7-6 4%;  Diabetic >=6 5%; Glycemic control for adults with diabetes <7 0% % 6 2 (H)   eAG, EST AVG Glucose      mg/dl 131   TESTOSTERONE FREE      7 2 - 24 0 pg/mL 8 5   Testosterone, Total, LC/MS      264 - 916 ng/dL 308   Vit D, 25-Hydroxy      30 0 - 100 0 ng/mL 66 1   TSH 3RD GENERATON      0 358 - 3 740 uIU/mL 1 650

## 2020-07-17 ENCOUNTER — OFFICE VISIT (OUTPATIENT)
Dept: PHYSICAL THERAPY | Facility: CLINIC | Age: 50
End: 2020-07-17
Payer: COMMERCIAL

## 2020-07-17 ENCOUNTER — OFFICE VISIT (OUTPATIENT)
Dept: OCCUPATIONAL THERAPY | Facility: CLINIC | Age: 50
End: 2020-07-17
Payer: COMMERCIAL

## 2020-07-17 DIAGNOSIS — M54.9 MID BACK PAIN: Primary | ICD-10-CM

## 2020-07-17 DIAGNOSIS — G56.22 CUBITAL TUNNEL SYNDROME ON LEFT: Primary | ICD-10-CM

## 2020-07-17 PROCEDURE — 97140 MANUAL THERAPY 1/> REGIONS: CPT | Performed by: PHYSICAL THERAPIST

## 2020-07-17 PROCEDURE — 97140 MANUAL THERAPY 1/> REGIONS: CPT | Performed by: OCCUPATIONAL THERAPIST

## 2020-07-17 PROCEDURE — 97110 THERAPEUTIC EXERCISES: CPT | Performed by: PHYSICAL THERAPIST

## 2020-07-17 NOTE — PROGRESS NOTES
Daily Note and Discharge    Today's date: 2020  Patient name: Adrienne Pate  : 1970  MRN: [id number]  Referring provider: Suasn Villalobos DO  Dx:   Encounter Diagnosis     ICD-10-CM    1  Mid back pain M54 9                   Subjective: Ribs have been pain free since IE      Objective: See treatment diary below  Altered POC       Assessment: Tolerated treatment well   Patient ready for DC      Plan: DC to HEP     Precautions: DM II      Manuals            R T7-10 Rib PA mobs Gr 3-5 10 8                                                  Neuro Re-Ed             Reassess painful movements  NA                                                                                         Ther Ex             HEP 10            B Thoracic Rot  : 20            TB Horiz Abd  G : 20           LTR B  : 20           B Th SBending  : 20           Th Ext  : 20           Bridges  : 20           Ab Thierry  : 20           Ther Activity                                       Gait Training                                       Modalities

## 2020-07-17 NOTE — PROGRESS NOTES
Daily Note     Today's date: 2020  Patient name: Sandra Kong  : 1970  MRN: [id number]  Referring provider: Griselda Tobias DO  Dx:   Encounter Diagnosis     ICD-10-CM    1  Cubital tunnel syndrome on left G56 22                   Subjective: He reports improved numbness and tingling in the fingers and also improved morning strength since using the elbow orthotic  Objective: See treatment diary below  Assessment: Tolerated treatment well  Patient would benefit from continued OT  Good tolerance towards manual techniques  Nerve gliding and STM did not increase subjective reports of numbness in the digits  Plan: Progress treatment as tolerated  If continued improvement of symptoms reported next week, can consider d/c with self management        Precautions: Universal         Manuals                    EO Fabricated                     TIANNA 10'  15'                   Cupping PRN                       IASTM PRN    5'                   Neuro Re-Ed                                                                                                                                                                                               Ther Ex                                                                                                                                                                                                                       Ther Activity                                                                       Gait Training                                                                       Modalities

## 2020-07-24 ENCOUNTER — APPOINTMENT (OUTPATIENT)
Dept: OCCUPATIONAL THERAPY | Facility: CLINIC | Age: 50
End: 2020-07-24
Payer: COMMERCIAL

## 2020-07-26 ENCOUNTER — TELEPHONE (OUTPATIENT)
Dept: OTHER | Facility: OTHER | Age: 50
End: 2020-07-26

## 2020-09-15 ENCOUNTER — APPOINTMENT (OUTPATIENT)
Dept: LAB | Facility: CLINIC | Age: 50
End: 2020-09-15
Payer: COMMERCIAL

## 2020-09-15 ENCOUNTER — TRANSCRIBE ORDERS (OUTPATIENT)
Dept: LAB | Facility: CLINIC | Age: 50
End: 2020-09-15

## 2020-09-15 DIAGNOSIS — E78.00 PURE HYPERCHOLESTEROLEMIA: ICD-10-CM

## 2020-09-15 DIAGNOSIS — Z79.4 TYPE 2 DIABETES MELLITUS WITH HYPERGLYCEMIA, WITH LONG-TERM CURRENT USE OF INSULIN (HCC): ICD-10-CM

## 2020-09-15 DIAGNOSIS — I10 HYPERTENSION GOAL BP (BLOOD PRESSURE) < 140/90: ICD-10-CM

## 2020-09-15 DIAGNOSIS — E11.65 TYPE 2 DIABETES MELLITUS WITH HYPERGLYCEMIA, WITH LONG-TERM CURRENT USE OF INSULIN (HCC): ICD-10-CM

## 2020-09-15 LAB
ALBUMIN SERPL BCP-MCNC: 3.9 G/DL (ref 3.5–5)
ALP SERPL-CCNC: 75 U/L (ref 46–116)
ALT SERPL W P-5'-P-CCNC: 45 U/L (ref 12–78)
ANION GAP SERPL CALCULATED.3IONS-SCNC: 8 MMOL/L (ref 4–13)
AST SERPL W P-5'-P-CCNC: 30 U/L (ref 5–45)
BILIRUB SERPL-MCNC: 0.62 MG/DL (ref 0.2–1)
BUN SERPL-MCNC: 8 MG/DL (ref 5–25)
CALCIUM SERPL-MCNC: 9.1 MG/DL (ref 8.3–10.1)
CHLORIDE SERPL-SCNC: 106 MMOL/L (ref 100–108)
CHOLEST SERPL-MCNC: 109 MG/DL (ref 50–200)
CO2 SERPL-SCNC: 27 MMOL/L (ref 21–32)
CREAT SERPL-MCNC: 0.68 MG/DL (ref 0.6–1.3)
CREAT UR-MCNC: 57.5 MG/DL
EST. AVERAGE GLUCOSE BLD GHB EST-MCNC: 128 MG/DL
GFR SERPL CREATININE-BSD FRML MDRD: 111 ML/MIN/1.73SQ M
GLUCOSE P FAST SERPL-MCNC: 119 MG/DL (ref 65–99)
HBA1C MFR BLD: 6.1 %
HDLC SERPL-MCNC: 35 MG/DL
LDLC SERPL CALC-MCNC: 57 MG/DL (ref 0–100)
MICROALBUMIN UR-MCNC: 10.2 MG/L (ref 0–20)
MICROALBUMIN/CREAT 24H UR: 18 MG/G CREATININE (ref 0–30)
NONHDLC SERPL-MCNC: 74 MG/DL
POTASSIUM SERPL-SCNC: 4.1 MMOL/L (ref 3.5–5.3)
PROT SERPL-MCNC: 6.8 G/DL (ref 6.4–8.2)
SODIUM SERPL-SCNC: 141 MMOL/L (ref 136–145)
TRIGL SERPL-MCNC: 83 MG/DL

## 2020-09-15 PROCEDURE — 83036 HEMOGLOBIN GLYCOSYLATED A1C: CPT

## 2020-09-15 PROCEDURE — 36415 COLL VENOUS BLD VENIPUNCTURE: CPT

## 2020-09-15 PROCEDURE — 82043 UR ALBUMIN QUANTITATIVE: CPT

## 2020-09-15 PROCEDURE — 80061 LIPID PANEL: CPT

## 2020-09-15 PROCEDURE — 80053 COMPREHEN METABOLIC PANEL: CPT

## 2020-09-15 PROCEDURE — 82570 ASSAY OF URINE CREATININE: CPT

## 2020-09-17 ENCOUNTER — OFFICE VISIT (OUTPATIENT)
Dept: ENDOCRINOLOGY | Facility: CLINIC | Age: 50
End: 2020-09-17
Payer: COMMERCIAL

## 2020-09-17 VITALS
HEIGHT: 69 IN | SYSTOLIC BLOOD PRESSURE: 110 MMHG | BODY MASS INDEX: 30.87 KG/M2 | WEIGHT: 208.4 LBS | DIASTOLIC BLOOD PRESSURE: 70 MMHG | TEMPERATURE: 98.4 F

## 2020-09-17 DIAGNOSIS — E11.65 TYPE 2 DIABETES MELLITUS WITH HYPERGLYCEMIA, WITH LONG-TERM CURRENT USE OF INSULIN (HCC): Primary | ICD-10-CM

## 2020-09-17 DIAGNOSIS — E78.00 PURE HYPERCHOLESTEROLEMIA: ICD-10-CM

## 2020-09-17 DIAGNOSIS — I10 HYPERTENSION GOAL BP (BLOOD PRESSURE) < 140/90: ICD-10-CM

## 2020-09-17 DIAGNOSIS — Z79.4 TYPE 2 DIABETES MELLITUS WITH HYPERGLYCEMIA, WITH LONG-TERM CURRENT USE OF INSULIN (HCC): Primary | ICD-10-CM

## 2020-09-17 PROCEDURE — 95251 CONT GLUC MNTR ANALYSIS I&R: CPT | Performed by: INTERNAL MEDICINE

## 2020-09-17 PROCEDURE — 99214 OFFICE O/P EST MOD 30 MIN: CPT | Performed by: INTERNAL MEDICINE

## 2020-09-17 NOTE — PROGRESS NOTES
ENDOCRINOLOGY  FOLLOW UP VISIT      Reason for Endocrine Consult/Chief Complaint: DM management     ? Medical Decision Making:     Impression  1  Type 2 Diabetes  2  HTN  3  HLD  4  Hives on Xolair idiopathic urticaria         Recommendations:  ?  Having a few low BGs around lunch when he does not eat- will reduce daytime basal rates to prevent low BGs when not eating      He uses the omnipod pump with novolog (older version) at the following settings:  Basal rates:  12AM-6AM 1 15 units/hr  6AM-9AM- 1 2 units/hr  9AM-12PM- 1 20 units/hr (reduced)  12PM-12AM- 1 20 units/hr (reduced)  Total daily basal requiremetn 28 5 units/day     IC  12AM-4:30PM- 6  4:30PM-8PM- 7  8PM-12AM- 6     ISF 20     AIT 3 5 hours     BG target 120     Continue ozempic 1mg weekly Sundays  Counseled on adverse SEs including nausea, vomiting, risk of pancreatitis and risk of medullary thyroid CA  No FHx of MEN2  He understood these risk and wished to continue therapy       Continue Synjardy 12 5-1000mg BID AC  Counseled on adverse SEs including urinary frequency, risk of UTI and yeast infection, risk of Tegan's gangrene  He understood these risks and wished to continue therapy       Freestyle Yoly CGM interpretation 9/17/20     CGM worn 73% of time Sept 4th-17th, 2020  Time in range 98% (goal >65-70%)  Hypoglycemia 1%  CV 31 1% (goal <33%)     Average glucose 104 mg/dL     BGs are well controlled and at goal, had a few low BGs during the day when not eating- adjusted lower the daytime basal rates            HLD- triglycerides 83, LDL 57 well controlled Sept 2020, continue atorvastatin 20mg qday     HTN- well controlled continue amlodipine 5mg qday     RTC 4 months  Sofie MCMILLAN    Endocrinology           History of Present Illness:   Mr Shannon Monaco is a 47year old male who presents for DM management-follow up    ?  PMH-HTN, HLD, DM2, Hives on Xolair idiopathic urtricaria   PSH- vein ligation/stripping  FHx-father with diabetes and brother with diabetes  SHx-10-12 cigarettes a day, works at [employer]      Type of DM: 2  Age of onset: 11 years ago    Microvascular complications:none known   Macrovascular complications:none known     Event since last visit:    Still on omnipod at same settings, on ozempic 1mg weekly sundays, synjardy 12 5-1000mg BID AC         ? Review of Systems:     Review of Systems   Constitutional: Negative for appetite change, chills, diaphoresis, fatigue, fever and unexpected weight change  HENT: Negative for congestion, ear pain, hearing loss, rhinorrhea, sinus pressure, sinus pain, sore throat, trouble swallowing and voice change  Eyes: Negative for photophobia, redness and visual disturbance  Respiratory: Negative for apnea, cough, chest tightness, shortness of breath, wheezing and stridor  Cardiovascular: Negative for chest pain, palpitations and leg swelling  Gastrointestinal: Negative for abdominal distention, abdominal pain, constipation, diarrhea, nausea and vomiting  Endocrine: Negative for cold intolerance, heat intolerance, polydipsia, polyphagia and polyuria  Genitourinary: Negative for difficulty urinating, dysuria, flank pain, frequency, hematuria and urgency  Musculoskeletal: Negative for arthralgias, back pain, gait problem, joint swelling and myalgias  Skin: Negative for color change, pallor, rash and wound  Allergic/Immunologic: Negative for immunocompromised state  Neurological: Negative for dizziness, tremors, syncope, weakness, light-headedness and headaches  Hematological: Negative for adenopathy  Does not bruise/bleed easily  Psychiatric/Behavioral: Negative for confusion and sleep disturbance  The patient is not nervous/anxious  ?   Patient History:     Past Medical History:   Diagnosis Date    Diabetes mellitus ([address] )     Hives     Hyperlipidemia     Hypertension      Past Surgical History:   Procedure Laterality Date    COLONOSCOPY  2017    VEIN LIGATION AND STRIPPING Bilateral      Social History     Socioeconomic History    Marital status: /Civil Union     Spouse name: Not on file    Number of children: Not on file    Years of education: Not on file    Highest education level: Not on file   Occupational History    Occupation:    Social Needs    Financial resource strain: Not on file    Food insecurity     Worry: Not on file     Inability: Not on file   [employer] needs     Medical: Not on file     Non-medical: Not on file   Tobacco Use    Smoking status: Current Every Day Smoker     Packs/day: 0 50    Smokeless tobacco: Never Used   Substance and Sexual Activity    Alcohol use: No    Drug use: No    Sexual activity: Not on file   Lifestyle    Physical activity     Days per week: Not on file     Minutes per session: Not on file    Stress: Not on file   Relationships    Social connections     Talks on phone: Not on file     Gets together: Not on file     Attends Taoism service: Not on file     Active member of club or organization: Not on file     Attends meetings of clubs or organizations: Not on file     Relationship status: Not on file    Intimate partner violence     Fear of current or ex partner: Not on file     Emotionally abused: Not on file     Physically abused: Not on file     Forced sexual activity: Not on file   Other Topics Concern    Not on file   Social History Narrative    Most recent tobacco use screenin2019    Do you currently or have you served in the [employer]: No    Were you activated, into active duty, as a member of the [employer] or as a Reservist: No    Occupation:     Marital status: Unknown    Sexual orientation: Heterosexual    Exercise level: Moderate    Diet: Regular    General stress level: Low    Alcohol intake: None    Caffeine intake:  Moderate    Chewing tobacco: none    Illicit drugs: no    Guns present in home: No    Seat belts used routinely: Yes    Sunscreen used routinely: Yes    Smoke alarm in home: Yes    Advance directive: No    Salt Intake: yes     Family History   Problem Relation Age of Onset    Diabetes Father     Heart disease Father     Diabetes Brother        Current Medications: At the time this note was written these were the medications the patient was on  Current Outpatient Medications   Medication Sig Dispense Refill    amLODIPine (NORVASC) 5 mg tablet Take 1 tablet (5 mg total) by mouth daily 90 tablet 1    atorvastatin (LIPITOR) 40 mg tablet Take 20 mg by mouth daily      azithromycin (ZITHROMAX) 250 mg tablet       brompheniramine-pseudoephedrine-DM 30-2-10 MG/5ML syrup Take 10 mL by mouth 4 (four) times a day as needed for congestion or cough (Patient not taking: Reported on 6/5/2019) 118 mL 0    cefdinir (OMNICEF) 300 mg capsule cefdinir 300 mg capsule      cetirizine (ZyrTEC) 5 MG tablet Take 40 mg by mouth daily   Continuous Blood Gluc Sensor ([employer] SystemsSTYLE ASHER 14 DAY SENSOR) MISC Use as directed  5    Empagliflozin-metFORMIN HCl ER (Synjardy XR) 12 5-1000 MG TB24 Take 12 5-1,000 mg by mouth 2 (two) times a day      EPINEPHrine (EpiPen 2-Neeraj) 0 3 mg/0 3 mL SOAJ EpiPen 2-Neeraj 0 3 mg/0 3 mL injection, auto-injector      ergocalciferol (VITAMIN D2) 50,000 units TAKE 1 CAPSULE WEEKLY WITH DINNER      hydrOXYzine HCL (ATARAX) 25 mg tablet Take 1 tablet (25 mg total) by mouth every 6 (six) hours as needed for itching 10 tablet 0    metaxalone (SKELAXIN) 800 mg tablet Take 1 tablet (800 mg total) by mouth daily at bedtime as needed for muscle spasms 20 tablet 0    multivitamin (THERAGRAN) TABS Take 1 tablet by mouth daily   Omalizumab (XOLAIR SC) Inject under the skin   OZEMPIC 1 MG/DOSE SOPN SUBCUTANEOUS 1MG SUBCUTANEOUS ONCE A WEEK  3    ranitidine (ZANTAC) 300 MG capsule Take 300 mg by mouth every evening   varenicline (CHANTIX) 0 5 mg tablet Take 0 5 mg by mouth as needed for smoking cessation         No current facility-administered medications for this visit  Allergies: Aspirin; Crestor [rosuvastatin]; Invokana [canagliflozin]; Janumet [sitagliptin-metformin hcl]; and Sitagliptin    Physical Exam:   Vital Signs:   /70   Temp 98 4 °F (36 9 °C)   Ht 5' 9" (1 753 m)   Wt 94 5 kg (208 lb 6 4 oz)   BMI 30 78 kg/m²     Physical Exam  Vitals signs reviewed  Constitutional:       General: He is not in acute distress  Appearance: Normal appearance  He is not ill-appearing, toxic-appearing or diaphoretic  HENT:      Head: Normocephalic and atraumatic  Right Ear: External ear normal       Left Ear: External ear normal       Nose: Nose normal    Eyes:      General: No scleral icterus  Extraocular Movements: Extraocular movements intact  Conjunctiva/sclera: Conjunctivae normal    Neck:      Musculoskeletal: Normal range of motion and neck supple  No muscular tenderness  Comments: No thyromegaly  Cardiovascular:      Rate and Rhythm: Normal rate and regular rhythm  Heart sounds: Normal heart sounds  No murmur  No friction rub  No gallop  Pulmonary:      Effort: Pulmonary effort is normal  No respiratory distress  Breath sounds: Normal breath sounds  No stridor  No wheezing, rhonchi or rales  Abdominal:      General: Bowel sounds are normal  There is no distension  Palpations: Abdomen is soft  There is no mass  Tenderness: There is no abdominal tenderness  There is no guarding or rebound  Hernia: No hernia is present  Musculoskeletal: Normal range of motion  General: No swelling  Lymphadenopathy:      Cervical: No cervical adenopathy  Skin:     General: Skin is warm and dry  Coloration: Skin is not pale  Findings: No erythema or rash  Neurological:      General: No focal deficit present  Mental Status: He is alert and oriented to person, place, and time     Psychiatric:         Mood and Affect: Mood normal          Behavior: Behavior normal          Thought Content: Thought content normal          Judgment: Judgment normal             Labs and Imaging:      Component      Latest Ref Rng & Units 9/15/2020   Sodium      136 - 145 mmol/L 141   Potassium      3 5 - 5 3 mmol/L 4 1   Chloride      100 - 108 mmol/L 106   CO2      21 - 32 mmol/L 27   Anion Gap      4 - 13 mmol/L 8   BUN      5 - 25 mg/dL 8   Creatinine      0 60 - 1 30 mg/dL 0 68   GLUCOSE FASTING      65 - 99 mg/dL 119 (H)   Calcium      8 3 - 10 1 mg/dL 9 1   AST      5 - 45 U/L 30   ALT      12 - 78 U/L 45   Alkaline Phosphatase      46 - 116 U/L 75   Total Protein      6 4 - 8 2 g/dL 6 8   Albumin      3 5 - 5 0 g/dL 3 9   TOTAL BILIRUBIN      0 20 - 1 00 mg/dL 0 62   eGFR      ml/min/1 73sq m 111   Cholesterol      50 - 200 mg/dL 109   Triglycerides      <=150 mg/dL 83   HDL      >=40 mg/dL 35 (L)   LDL Calculated      0 - 100 mg/dL 57   Non-HDL Cholesterol      mg/dl 74   EXT Creatinine Urine      mg/dL 57 5   MICROALBUM ,U,RANDOM      0 0 - 20 0 mg/L 10 2   MICROALBUMIN/CREATININE RATIO      0 - 30 mg/g creatinine 18   Hemoglobin A1C      Normal 3 8-5 6%; PreDiabetic 5 7-6 4%;  Diabetic >=6 5%; Glycemic control for adults with diabetes <7 0% % 6 1 (H)   eAG, EST AVG Glucose      mg/dl 128

## 2020-10-29 DIAGNOSIS — E11.65 TYPE 2 DIABETES MELLITUS WITH HYPERGLYCEMIA, WITH LONG-TERM CURRENT USE OF INSULIN (HCC): Primary | ICD-10-CM

## 2020-10-29 DIAGNOSIS — Z79.4 TYPE 2 DIABETES MELLITUS WITH HYPERGLYCEMIA, WITH LONG-TERM CURRENT USE OF INSULIN (HCC): Primary | ICD-10-CM

## 2020-10-29 RX ORDER — ERGOCALCIFEROL 1.25 MG/1
CAPSULE ORAL
Qty: 13 CAPSULE | Refills: 1 | OUTPATIENT
Start: 2020-10-29

## 2020-10-29 RX ORDER — SEMAGLUTIDE 1.34 MG/ML
INJECTION, SOLUTION SUBCUTANEOUS
Qty: 6 PEN | Refills: 1 | Status: SHIPPED | OUTPATIENT
Start: 2020-10-29 | End: 2021-05-11

## 2020-11-02 RX ORDER — ATORVASTATIN CALCIUM 20 MG/1
TABLET, FILM COATED ORAL
COMMUNITY
Start: 2020-10-08 | End: 2021-04-09 | Stop reason: SDUPTHER

## 2020-11-03 ENCOUNTER — OFFICE VISIT (OUTPATIENT)
Dept: FAMILY MEDICINE CLINIC | Facility: CLINIC | Age: 50
End: 2020-11-03
Payer: COMMERCIAL

## 2020-11-03 VITALS
HEIGHT: 69 IN | DIASTOLIC BLOOD PRESSURE: 62 MMHG | SYSTOLIC BLOOD PRESSURE: 122 MMHG | OXYGEN SATURATION: 98 % | WEIGHT: 205 LBS | RESPIRATION RATE: 16 BRPM | BODY MASS INDEX: 30.36 KG/M2 | HEART RATE: 85 BPM | TEMPERATURE: 98.1 F

## 2020-11-03 DIAGNOSIS — I10 ESSENTIAL HYPERTENSION: ICD-10-CM

## 2020-11-03 DIAGNOSIS — E11.65 TYPE 2 DIABETES MELLITUS WITH HYPERGLYCEMIA, WITH LONG-TERM CURRENT USE OF INSULIN (HCC): ICD-10-CM

## 2020-11-03 DIAGNOSIS — E11.65 UNCONTROLLED TYPE 2 DIABETES MELLITUS WITH HYPERGLYCEMIA (HCC): Primary | ICD-10-CM

## 2020-11-03 DIAGNOSIS — E78.5 HYPERLIPIDEMIA, UNSPECIFIED HYPERLIPIDEMIA TYPE: ICD-10-CM

## 2020-11-03 DIAGNOSIS — Z23 NEED FOR VACCINATION: Primary | ICD-10-CM

## 2020-11-03 DIAGNOSIS — E11.9 TYPE 2 DIABETES MELLITUS WITHOUT COMPLICATION, WITH LONG-TERM CURRENT USE OF INSULIN (HCC): ICD-10-CM

## 2020-11-03 DIAGNOSIS — Z79.4 TYPE 2 DIABETES MELLITUS WITHOUT COMPLICATION, WITH LONG-TERM CURRENT USE OF INSULIN (HCC): ICD-10-CM

## 2020-11-03 DIAGNOSIS — Z00.00 WELL ADULT EXAM: ICD-10-CM

## 2020-11-03 DIAGNOSIS — M79.10 MYALGIA: ICD-10-CM

## 2020-11-03 DIAGNOSIS — Z12.11 SCREENING FOR MALIGNANT NEOPLASM OF COLON: ICD-10-CM

## 2020-11-03 DIAGNOSIS — Z79.4 TYPE 2 DIABETES MELLITUS WITH HYPERGLYCEMIA, WITH LONG-TERM CURRENT USE OF INSULIN (HCC): ICD-10-CM

## 2020-11-03 PROCEDURE — 3725F SCREEN DEPRESSION PERFORMED: CPT | Performed by: FAMILY MEDICINE

## 2020-11-03 PROCEDURE — 99396 PREV VISIT EST AGE 40-64: CPT | Performed by: FAMILY MEDICINE

## 2020-11-03 PROCEDURE — 90686 IIV4 VACC NO PRSV 0.5 ML IM: CPT | Performed by: FAMILY MEDICINE

## 2020-11-03 PROCEDURE — 3008F BODY MASS INDEX DOCD: CPT | Performed by: FAMILY MEDICINE

## 2020-11-03 PROCEDURE — 90471 IMMUNIZATION ADMIN: CPT | Performed by: FAMILY MEDICINE

## 2020-11-03 PROCEDURE — 3074F SYST BP LT 130 MM HG: CPT | Performed by: FAMILY MEDICINE

## 2020-11-03 PROCEDURE — 3078F DIAST BP <80 MM HG: CPT | Performed by: FAMILY MEDICINE

## 2020-11-03 PROCEDURE — 4004F PT TOBACCO SCREEN RCVD TLK: CPT | Performed by: FAMILY MEDICINE

## 2020-11-03 RX ORDER — VITAMIN B COMPLEX
1 CAPSULE ORAL DAILY
COMMUNITY

## 2020-11-03 RX ORDER — FLASH GLUCOSE SENSOR
KIT MISCELLANEOUS
Qty: 6 EACH | Refills: 4 | Status: SHIPPED | OUTPATIENT
Start: 2020-11-03 | End: 2021-12-16

## 2020-11-05 ENCOUNTER — TELEPHONE (OUTPATIENT)
Dept: GASTROENTEROLOGY | Facility: AMBULARY SURGERY CENTER | Age: 50
End: 2020-11-05

## 2020-11-25 ENCOUNTER — TELEPHONE (OUTPATIENT)
Dept: ENDOCRINOLOGY | Facility: CLINIC | Age: 50
End: 2020-11-25

## 2020-11-27 ENCOUNTER — TELEPHONE (OUTPATIENT)
Dept: ENDOCRINOLOGY | Facility: CLINIC | Age: 50
End: 2020-11-27

## 2020-11-27 DIAGNOSIS — E55.9 VITAMIN D DEFICIENCY: Primary | ICD-10-CM

## 2021-01-02 DIAGNOSIS — I10 HYPERTENSION GOAL BP (BLOOD PRESSURE) < 140/90: ICD-10-CM

## 2021-01-04 RX ORDER — AMLODIPINE BESYLATE 5 MG/1
TABLET ORAL
Qty: 90 TABLET | Refills: 1 | Status: SHIPPED | OUTPATIENT
Start: 2021-01-04 | End: 2021-07-06

## 2021-01-07 ENCOUNTER — TELEPHONE (OUTPATIENT)
Dept: ENDOCRINOLOGY | Facility: CLINIC | Age: 51
End: 2021-01-07

## 2021-01-20 ENCOUNTER — OFFICE VISIT (OUTPATIENT)
Dept: URGENT CARE | Facility: CLINIC | Age: 51
End: 2021-01-20
Payer: COMMERCIAL

## 2021-01-20 ENCOUNTER — APPOINTMENT (OUTPATIENT)
Dept: RADIOLOGY | Facility: CLINIC | Age: 51
End: 2021-01-20
Payer: COMMERCIAL

## 2021-01-20 VITALS
SYSTOLIC BLOOD PRESSURE: 139 MMHG | WEIGHT: 205 LBS | HEIGHT: 69 IN | DIASTOLIC BLOOD PRESSURE: 65 MMHG | OXYGEN SATURATION: 95 % | HEART RATE: 82 BPM | TEMPERATURE: 97.6 F | RESPIRATION RATE: 16 BRPM | BODY MASS INDEX: 30.36 KG/M2

## 2021-01-20 DIAGNOSIS — S99.912A INJURY OF LEFT ANKLE, INITIAL ENCOUNTER: ICD-10-CM

## 2021-01-20 DIAGNOSIS — S99.912A INJURY OF LEFT ANKLE, INITIAL ENCOUNTER: Primary | ICD-10-CM

## 2021-01-20 PROCEDURE — 73610 X-RAY EXAM OF ANKLE: CPT

## 2021-01-20 PROCEDURE — 99213 OFFICE O/P EST LOW 20 MIN: CPT | Performed by: NURSE PRACTITIONER

## 2021-01-20 PROCEDURE — 29515 APPLICATION SHORT LEG SPLINT: CPT | Performed by: NURSE PRACTITIONER

## 2021-01-20 NOTE — PROGRESS NOTES
[facility]        NAME: Ester George is a 45 y o  male  : 1970    MRN: [id number]  DATE: 2021  TIME: 7:18 PM    Assessment and Plan   Injury of left ankle, initial encounter [P71 912A]  1  Injury of left ankle, initial encounter  XR ankle 3+ vw left    Ambulatory referral to Orthopedic Surgery    Ambulatory referral to Physical Therapy     --Patient declines need for crutches at this time  Patient Instructions       --Initial read of x-ray negative for fracture  Will call with final results when obtained (anticipate 2-12 hours)  Likely mild sprain of lateral ankle/midfoot  --Minimize weight bearing, ice, elevate for the next few days  --Ankle brace for the next week while weight bearing, and then begin ROM exercises per handout, using ankle brace as needed  --Physical therapy  --Motrin/Advil as needed for pain  Alterative is OTC Voltaren gel  --Follow-up with ortho if no improvement/resolution over the next 2-3 weeks  Chief Complaint     Chief Complaint   Patient presents with    Foot Pain     Pt twisted his left ankle this morning when he was walking down the steps  History of Present Illness         Here with complaints of left ankle/foot injury  Missed a step while going down the stairs at home this morning  Inverted left ankle and scraped skin just below knee  Mild pain in ankle initially, but able to bear weight without difficulty  Looked slightly swollen  Went to work, seated with leg up most of the day  When he went to stand on it at the end of the work day, the left ankle/foot felt significantly more painful, so he decided to come here and get it evaluated  Rates pain 5/10 at present  No ice applied  No analgesics taken  Pain is lateral dorsal mid foot primarily, also lateral malleolus  Hurts more at limits of ankle movement and with weight bearing  No numbness/tingling  Denies knee/shin pain at present  No prior ankle/foot injuries  Review of Systems   Review of Systems   Constitutional: Negative for fever  Musculoskeletal:        Per HPI   Skin:        Per HPI   Neurological: Negative for numbness  Current Medications       Current Outpatient Medications:     amLODIPine (NORVASC) 5 mg tablet, TAKE 1 TABLET BY MOUTH EVERY DAY, Disp: 90 tablet, Rfl: 1    atorvastatin (LIPITOR) 20 mg tablet, TAKE 1 TABLET BY MOUTH EVERY DAY AT NIGHT, Disp: , Rfl:     b complex vitamins capsule, Take 1 capsule by mouth daily, Disp: , Rfl:     cetirizine (ZyrTEC) 5 MG tablet, Take 40 mg by mouth daily  , Disp: , Rfl:     co-enzyme Q-10 30 MG capsule, Take 30 mg by mouth 3 (three) times a day, Disp: , Rfl:     Continuous Blood Gluc Sensor (Root OrangeStyle Yoly 14 Day Sensor) MISC, Use 1 sensor every 14 days to monitor blood sugar, Disp: 6 each, Rfl: 4    Empagliflozin-metFORMIN HCl ER (Synjardy XR) 12 5-1000 MG TB24, Take 12 5-1,000 mg by mouth 2 (two) times a day, Disp: , Rfl:     EPINEPHrine (EpiPen 2-Neeraj) 0 3 mg/0 3 mL SOAJ, EpiPen 2-Neeraj 0 3 mg/0 3 mL injection, auto-injector, Disp: , Rfl:     ergocalciferol (VITAMIN D2) 50,000 units, TAKE 1 CAPSULE WEEKLY WITH DINNER, Disp: , Rfl:     multivitamin (THERAGRAN) TABS, Take 1 tablet by mouth daily  , Disp: , Rfl:     NovoLOG 100 UNIT/ML injection, USE 60U VIA PUMP DAILY, Disp: , Rfl:     Omalizumab (Christiano Comfort SC), Inject under the skin every 30 (thirty) days , Disp: , Rfl:     Ozempic, 1 MG/DOSE, 2 MG/1 5ML SOPN, INJECT 1 MG UNDER THE SKIN ONCE A WEEK , Disp: 6 pen, Rfl: 1    varenicline (CHANTIX) 0 5 mg tablet, Take 0 5 mg by mouth as needed for smoking cessation  , Disp: , Rfl:     Current Allergies     Allergies as of 01/20/2021 - Reviewed 01/20/2021   Allergen Reaction Noted    Aspirin  07/28/2016    Crestor [rosuvastatin] Hives 07/28/2016    Invokana [canagliflozin] Hives 07/28/2016    Janumet [sitagliptin-metformin hcl] Hives 06/05/2019    Sitagliptin Hives 07/16/2014            The following portions of the patient's history were reviewed and updated as appropriate: allergies, current medications, past family history, past medical history, past social history, past surgical history and problem list      Past Medical History:   Diagnosis Date    Diabetes mellitus ([address] )     Hives     Hyperlipidemia     Hypertension        Past Surgical History:   Procedure Laterality Date    COLONOSCOPY  2017    VEIN LIGATION AND STRIPPING Bilateral        Family History   Problem Relation Age of Onset    Diabetes Father     Heart disease Father     Diabetes Brother          Medications have been verified  Objective   /65   Pulse 82   Temp 97 6 °F (36 4 °C)   Resp 16   Ht 5' 9" (1 753 m)   Wt 93 kg (205 lb)   SpO2 95%   BMI 30 27 kg/m²   No LMP for male patient  Physical Exam     Physical Exam  Constitutional:       General: He is not in acute distress  Appearance: He is not ill-appearing or diaphoretic  Musculoskeletal: Normal range of motion  General: Swelling and tenderness present  No deformity  Comments: Left foot with mild tenderness of lateral > medial aspect of dorsal midfoot, as well as lateral malleolus and just surrounding  Associated trace swelling  No bruising or visualized deformity  Remainder of left foot/ankle nontender with normal appearance including calcaneous, Achilles, syndesmosis, distal tibia/fibula, medial malleolus, forefoot/toes, plantar surface  Normal AROM ankle/foot with some reproduction of pain at limits of plantar flexion, dorsiflexion, inversion  No increased pain with resisted dorsiflexion  Negative anterior drawer, talar tilt  Toes with normal sensation, color, temp, cap refill  2+ DP pulses  Minimally antalgic gait  Neurological:      Mental Status: He is alert  Sensory: No sensory deficit  Motor: No weakness     Psychiatric:         Mood and Affect: Mood normal        Orthopedic injury treatment    Date/Time: 1/20/2021 7:35 PM  Performed by: SHAMIR Andrew  Authorized by: SHAMIR Andrew     Patient Location:  Clinic  Verbal consent obtained?: Yes    Risks and benefits: Risks, benefits and alternatives were discussed    Consent given by:  Patient  Patient states understanding of procedure being performed: Yes    Patient's understanding of procedure matches consent: Yes    Procedure consent matches procedure scheduled: Yes    Injury location:  Ankle  Location details:  Left ankle  Injury type: Soft tissue  Neurovascular status: Neurovascularly intact    Distal perfusion: normal    Neurological function: normal    Range of motion: normal    Local anesthesia used?: No    Immobilization:  Splint  Splint type: ankle, static    Neurovascular status: Neurovascularly intact    Distal perfusion: normal    Neurological function: normal    Range of motion: normal    Patient tolerance:  Patient tolerated the procedure well with no immediate complications

## 2021-01-21 NOTE — PATIENT INSTRUCTIONS
--Initial read of x-ray negative for fracture  Will call with final results when obtained (anticipate 2-12 hours)  Likely mild sprain of lateral ankle/midfoot  --Minimize weight bearing, ice, elevate for the next few days  --Ankle brace for the next week while weight bearing, and then begin ROM exercises per handout, using ankle brace as needed  --Physical therapy  --Motrin/Advil as needed for pain  Alterative is OTC Voltaren gel  --Follow-up with ortho if no improvement/resolution over the next 2-3 weeks

## 2021-02-04 ENCOUNTER — TELEPHONE (OUTPATIENT)
Dept: ENDOCRINOLOGY | Facility: CLINIC | Age: 51
End: 2021-02-04

## 2021-02-08 ENCOUNTER — TELEPHONE (OUTPATIENT)
Dept: ENDOCRINOLOGY | Facility: CLINIC | Age: 51
End: 2021-02-08

## 2021-02-08 NOTE — TELEPHONE ENCOUNTER
Spoke with patient's wife and reviewed pump download and CGM report  She understood  She said that the patient is experiencing several low readings  She will get them off his phone and send message in [place]

## 2021-02-08 NOTE — TELEPHONE ENCOUNTER
Please let patient know I reviewed insulin pump download and CGM report     Blood sugars look stable     Continue same regimen for now

## 2021-02-25 ENCOUNTER — TELEPHONE (OUTPATIENT)
Dept: ENDOCRINOLOGY | Facility: CLINIC | Age: 51
End: 2021-02-25

## 2021-02-26 ENCOUNTER — LAB (OUTPATIENT)
Dept: LAB | Facility: CLINIC | Age: 51
End: 2021-02-26
Payer: COMMERCIAL

## 2021-02-26 ENCOUNTER — TRANSCRIBE ORDERS (OUTPATIENT)
Dept: LAB | Facility: CLINIC | Age: 51
End: 2021-02-26

## 2021-02-26 DIAGNOSIS — I10 HYPERTENSION GOAL BP (BLOOD PRESSURE) < 140/90: ICD-10-CM

## 2021-02-26 DIAGNOSIS — E78.00 PURE HYPERCHOLESTEROLEMIA: ICD-10-CM

## 2021-02-26 DIAGNOSIS — Z79.4 TYPE 2 DIABETES MELLITUS WITH HYPERGLYCEMIA, WITH LONG-TERM CURRENT USE OF INSULIN (HCC): ICD-10-CM

## 2021-02-26 DIAGNOSIS — E11.65 TYPE 2 DIABETES MELLITUS WITH HYPERGLYCEMIA, WITH LONG-TERM CURRENT USE OF INSULIN (HCC): ICD-10-CM

## 2021-02-26 DIAGNOSIS — E55.9 VITAMIN D DEFICIENCY: ICD-10-CM

## 2021-02-26 LAB
25(OH)D3 SERPL-MCNC: 29.6 NG/ML (ref 30–100)
ALBUMIN SERPL BCP-MCNC: 4.2 G/DL (ref 3.5–5)
ALP SERPL-CCNC: 79 U/L (ref 46–116)
ALT SERPL W P-5'-P-CCNC: 41 U/L (ref 12–78)
ANION GAP SERPL CALCULATED.3IONS-SCNC: 6 MMOL/L (ref 4–13)
AST SERPL W P-5'-P-CCNC: 18 U/L (ref 5–45)
BILIRUB SERPL-MCNC: 0.42 MG/DL (ref 0.2–1)
BUN SERPL-MCNC: 15 MG/DL (ref 5–25)
CALCIUM SERPL-MCNC: 9.2 MG/DL (ref 8.3–10.1)
CHLORIDE SERPL-SCNC: 110 MMOL/L (ref 100–108)
CHOLEST SERPL-MCNC: 99 MG/DL (ref 50–200)
CO2 SERPL-SCNC: 25 MMOL/L (ref 21–32)
CREAT SERPL-MCNC: 0.82 MG/DL (ref 0.6–1.3)
EST. AVERAGE GLUCOSE BLD GHB EST-MCNC: 123 MG/DL
GFR SERPL CREATININE-BSD FRML MDRD: 102 ML/MIN/1.73SQ M
GLUCOSE P FAST SERPL-MCNC: 110 MG/DL (ref 65–99)
HBA1C MFR BLD: 5.9 %
HDLC SERPL-MCNC: 32 MG/DL
LDLC SERPL CALC-MCNC: 43 MG/DL (ref 0–100)
NONHDLC SERPL-MCNC: 67 MG/DL
POTASSIUM SERPL-SCNC: 4.1 MMOL/L (ref 3.5–5.3)
PROT SERPL-MCNC: 7.2 G/DL (ref 6.4–8.2)
SODIUM SERPL-SCNC: 141 MMOL/L (ref 136–145)
TRIGL SERPL-MCNC: 118 MG/DL

## 2021-02-26 PROCEDURE — 83036 HEMOGLOBIN GLYCOSYLATED A1C: CPT

## 2021-02-26 PROCEDURE — 3044F HG A1C LEVEL LT 7.0%: CPT | Performed by: INTERNAL MEDICINE

## 2021-02-26 PROCEDURE — 82306 VITAMIN D 25 HYDROXY: CPT

## 2021-02-26 PROCEDURE — 36415 COLL VENOUS BLD VENIPUNCTURE: CPT

## 2021-02-26 PROCEDURE — 80061 LIPID PANEL: CPT

## 2021-02-26 PROCEDURE — 80053 COMPREHEN METABOLIC PANEL: CPT

## 2021-03-03 ENCOUNTER — OFFICE VISIT (OUTPATIENT)
Dept: ENDOCRINOLOGY | Facility: CLINIC | Age: 51
End: 2021-03-03
Payer: COMMERCIAL

## 2021-03-03 VITALS
WEIGHT: 195 LBS | HEART RATE: 83 BPM | SYSTOLIC BLOOD PRESSURE: 130 MMHG | DIASTOLIC BLOOD PRESSURE: 70 MMHG | HEIGHT: 69 IN | BODY MASS INDEX: 28.88 KG/M2

## 2021-03-03 DIAGNOSIS — I10 HYPERTENSION GOAL BP (BLOOD PRESSURE) < 140/90: Primary | ICD-10-CM

## 2021-03-03 DIAGNOSIS — E11.65 TYPE 2 DIABETES MELLITUS WITH HYPERGLYCEMIA, WITH LONG-TERM CURRENT USE OF INSULIN (HCC): ICD-10-CM

## 2021-03-03 DIAGNOSIS — E78.00 PURE HYPERCHOLESTEROLEMIA: ICD-10-CM

## 2021-03-03 DIAGNOSIS — Z79.4 TYPE 2 DIABETES MELLITUS WITH HYPERGLYCEMIA, WITH LONG-TERM CURRENT USE OF INSULIN (HCC): ICD-10-CM

## 2021-03-03 PROCEDURE — 3078F DIAST BP <80 MM HG: CPT | Performed by: INTERNAL MEDICINE

## 2021-03-03 PROCEDURE — 3075F SYST BP GE 130 - 139MM HG: CPT | Performed by: INTERNAL MEDICINE

## 2021-03-03 PROCEDURE — 99214 OFFICE O/P EST MOD 30 MIN: CPT | Performed by: INTERNAL MEDICINE

## 2021-03-03 PROCEDURE — 3008F BODY MASS INDEX DOCD: CPT | Performed by: INTERNAL MEDICINE

## 2021-03-03 PROCEDURE — 4004F PT TOBACCO SCREEN RCVD TLK: CPT | Performed by: INTERNAL MEDICINE

## 2021-03-03 RX ORDER — FLASH GLUCOSE SENSOR
KIT MISCELLANEOUS
COMMUNITY
Start: 2020-12-08

## 2021-03-03 RX ORDER — CHOLECALCIFEROL (VITAMIN D3) 1250 MCG
CAPSULE ORAL
COMMUNITY
Start: 2019-10-01 | End: 2022-02-15 | Stop reason: ALTCHOICE

## 2021-03-03 RX ORDER — ATORVASTATIN CALCIUM 20 MG/1
TABLET, FILM COATED ORAL
COMMUNITY
Start: 2021-01-07

## 2021-03-03 NOTE — PROGRESS NOTES
ENDOCRINOLOGY  FOLLOW UP VISIT      Reason for Endocrine Consult/Chief Complaint: DM follow up    ? Medical Decision Making:     Impression  1  Type 2 Diabetes  2  HTN  3  HLD  4  Hives on Xolair idiopathic urticaria         Recommendations:  ?  Having low BGs throughout the day in the setting of losing weight and exertion  Reduced basal setting to prevent hypoglycemia  Increase IC and ISF to reduce mealtime insulin dose as well  Counseled on treating hypoglycemia, he has glucose tablets  Instructed to let me know if hypoglycemia re-occurs       He uses the omnipod pump with novolog at the following settings:  Basal rates:  1 unit/hr all day (reduced)  Total daily basal requiremetn 24 units/day     IC  8 all day (increased)     ISF 30 (increased)     AIT 3 5 hours     BG target 120     Continue ozempic 1mg weekly Sundays  Counseled on adverse SEs including nausea, vomiting, risk of pancreatitis and risk of medullary thyroid CA, gallstones, cholecystitis  No FHx of MEN2  He understood these risk and wished to continue therapy       Continue Synjardy 12 5-1000mg BID AC  Counseled on adverse SEs including urinary frequency, risk of UTI and yeast infection, risk of Tegan's gangrene  He understood these risks and wished to continue therapy               HLD- triglycerides 118, LDL 43 Feb 2021, continue atorvastatin 20mg qday     HTN- well controlled continue amlodipine 5mg qday     RTC 4 months  Nuria MCMILLAN  Endocrinology           History of Present Illness:   Mr Desire Blake is a 47year old male who presents for DM management-follow up    ?   PMH-HTN, HLD, DM2, Hives on Xolair idiopathic urtricaria   PSH- vein ligation/stripping  FHx-father with diabetes and brother with diabetes  SHx-10-12 cigarettes a day, works at [employer]      Type of DM: 2  Age of onset: 11 years ago    Microvascular complications:none known   Macrovascular complications:none known      Event since last visit:     Remains on omnipod at same settings, on ozempic 1mg weekly sundays, synjardy 12 5-1000mg BID AC    Having low BGs during the day especially afternoon    Lost 8lbs exercising more     ? Review of Systems:     Review of Systems   Constitutional: Negative for appetite change, chills, diaphoresis, fatigue, fever and unexpected weight change  HENT: Negative for congestion, ear pain, hearing loss, rhinorrhea, sinus pressure, sinus pain, sore throat, trouble swallowing and voice change  Eyes: Negative for photophobia, redness and visual disturbance  Respiratory: Negative for apnea, cough, chest tightness, shortness of breath, wheezing and stridor  Cardiovascular: Negative for chest pain, palpitations and leg swelling  Gastrointestinal: Negative for abdominal distention, abdominal pain, constipation, diarrhea, nausea and vomiting  Endocrine: Negative for cold intolerance, heat intolerance, polydipsia, polyphagia and polyuria  Genitourinary: Negative for difficulty urinating, dysuria, flank pain, frequency, hematuria and urgency  Musculoskeletal: Negative for arthralgias, back pain, gait problem, joint swelling and myalgias  Skin: Negative for color change, pallor, rash and wound  Allergic/Immunologic: Negative for immunocompromised state  Neurological: Negative for dizziness, tremors, syncope, weakness, light-headedness and headaches  Hematological: Negative for adenopathy  Does not bruise/bleed easily  Psychiatric/Behavioral: Negative for confusion and sleep disturbance  The patient is not nervous/anxious  ?   Patient History:     Past Medical History:   Diagnosis Date    Diabetes mellitus ([facility] [address] )     Hives     Hyperlipidemia     Hypertension      Past Surgical History:   Procedure Laterality Date    COLONOSCOPY  2017    VEIN LIGATION AND STRIPPING Bilateral      Social History     Socioeconomic History    Marital status: /Civil Union     Spouse name: Not on file    Number of children: Not on file  Years of education: Not on file    Highest education level: Not on file   Occupational History    Occupation:    Social Needs    Financial resource strain: Not on file    Food insecurity     Worry: Not on file     Inability: Not on file   [employer] needs     Medical: Not on file     Non-medical: Not on file   Tobacco Use    Smoking status: Light Tobacco Smoker     Packs/day: 0 50    Smokeless tobacco: Never Used    Tobacco comment: trying to quit 20 using Chantix   Substance and Sexual Activity    Alcohol use: No    Drug use: No    Sexual activity: Not on file   Lifestyle    Physical activity     Days per week: Not on file     Minutes per session: Not on file    Stress: Not on file   Relationships    Social connections     Talks on phone: Not on file     Gets together: Not on file     Attends Sikh service: Not on file     Active member of club or organization: Not on file     Attends meetings of clubs or organizations: Not on file     Relationship status: Not on file    Intimate partner violence     Fear of current or ex partner: Not on file     Emotionally abused: Not on file     Physically abused: Not on file     Forced sexual activity: Not on file   Other Topics Concern    Not on file   Social History Narrative    Most recent tobacco use screenin2019    Do you currently or have you served in [employer]: No    Were you activated, into active duty, as a member of the [employer] or as a Reservist: No    Occupation:     Marital status: Unknown    Sexual orientation: Heterosexual    Exercise level: Moderate    Diet: Regular    General stress level: Low    Alcohol intake: None    Caffeine intake:  Moderate    Chewing tobacco: none    Illicit drugs: no    Guns present in home: No    Seat belts used routinely: Yes    Sunscreen used routinely: Yes    Smoke alarm in home: Yes    Advance directive: No    Salt Intake: yes     Family History   Problem Relation Age of Onset    Diabetes Father     Heart disease Father     Diabetes Brother        Current Medications: At the time this note was written these were the medications the patient was on  Current Outpatient Medications   Medication Sig Dispense Refill    amLODIPine (NORVASC) 5 mg tablet TAKE 1 TABLET BY MOUTH EVERY DAY 90 tablet 1    atorvastatin (LIPITOR) 20 mg tablet TAKE 1 TABLET BY MOUTH EVERY DAY AT NIGHT      atorvastatin (LIPITOR) 20 mg tablet TAKE 1 TABLET BY MOUTH EVERY DAY AT NIGHT      b complex vitamins capsule Take 1 capsule by mouth daily      co-enzyme Q-10 30 MG capsule Take 30 mg by mouth 3 (three) times a day      Continuous Blood Gluc Sensor (FreeStyle Yoly 14 Day Sensor) MISC Use 1 sensor every 14 days to monitor blood sugar 6 each 4    Continuous Blood Gluc Sensor (FreeStyle Yoly 14 Day Sensor) MISC 1 KIT BY MISCELLANEOUS ROUTE EVERY 14 (FOURTEEN) DAYS   Empagliflozin-metFORMIN HCl ER (Synjardy XR) 12 5-1000 MG TB24 Take 12 5-1,000 mg by mouth 2 (two) times a day      EPINEPHrine (EpiPen 2-Neeraj) 0 3 mg/0 3 mL SOAJ EpiPen 2-Neeraj 0 3 mg/0 3 mL injection, auto-injector      insulin aspart (NovoLOG) 100 units/mL injection USE 60U VIA PUMP DAILY      multivitamin (THERAGRAN) TABS Take 1 tablet by mouth daily   NovoLOG 100 UNIT/ML injection USE 60U VIA PUMP DAILY      Omalizumab (XOLAIR SC) Inject under the skin every 30 (thirty) days       Ozempic, 1 MG/DOSE, 2 MG/1 5ML SOPN INJECT 1 MG UNDER THE SKIN ONCE A WEEK  6 pen 1    varenicline (CHANTIX) 0 5 mg tablet Take 0 5 mg by mouth as needed for smoking cessation   cetirizine (ZyrTEC) 5 MG tablet Take 40 mg by mouth daily   Cholecalciferol (Vitamin D3) 1 25 MG (31282 UT) CAPS       ergocalciferol (VITAMIN D2) 50,000 units TAKE 1 CAPSULE WEEKLY WITH DINNER       No current facility-administered medications for this visit          Allergies: Aspirin, Crestor [rosuvastatin], Invokana [canagliflozin], Janumet [sitagliptin-metformin hcl], and Sitagliptin    Physical Exam:   Vital Signs:   /70   Pulse 83   Ht 5' 9" (1 753 m)   Wt 88 5 kg (195 lb)   BMI 28 80 kg/m²     Physical Exam  Vitals signs reviewed  Constitutional:       General: He is not in acute distress  Appearance: Normal appearance  He is not ill-appearing, toxic-appearing or diaphoretic  HENT:      Head: Normocephalic and atraumatic  Right Ear: External ear normal       Left Ear: External ear normal       Nose: Nose normal    Eyes:      General: No scleral icterus  Extraocular Movements: Extraocular movements intact  Conjunctiva/sclera: Conjunctivae normal    Neck:      Musculoskeletal: Normal range of motion and neck supple  No muscular tenderness  Comments: No thyromegaly or nodules  Cardiovascular:      Rate and Rhythm: Normal rate and regular rhythm  Heart sounds: Normal heart sounds  No murmur  No friction rub  No gallop  Pulmonary:      Effort: Pulmonary effort is normal  No respiratory distress  Breath sounds: Normal breath sounds  No stridor  No wheezing, rhonchi or rales  Abdominal:      General: Bowel sounds are normal  There is no distension  Palpations: Abdomen is soft  There is no mass  Tenderness: There is no abdominal tenderness  There is no guarding or rebound  Hernia: No hernia is present  Musculoskeletal: Normal range of motion  General: No swelling  Lymphadenopathy:      Cervical: No cervical adenopathy  Skin:     General: Skin is warm and dry  Coloration: Skin is not pale  Findings: No erythema or rash  Neurological:      General: No focal deficit present  Mental Status: He is alert and oriented to person, place, and time  Psychiatric:         Mood and Affect: Mood normal          Behavior: Behavior normal          Thought Content:  Thought content normal          Judgment: Judgment normal             Labs and Imaging:      Component Latest Ref Rng & Units 2/26/2021   Sodium      136 - 145 mmol/L 141   Potassium      3 5 - 5 3 mmol/L 4 1   Chloride      100 - 108 mmol/L 110 (H)   CO2      21 - 32 mmol/L 25   Anion Gap      4 - 13 mmol/L 6   BUN      5 - 25 mg/dL 15   Creatinine      0 60 - 1 30 mg/dL 0 82   GLUCOSE FASTING      65 - 99 mg/dL 110 (H)   Calcium      8 3 - 10 1 mg/dL 9 2   AST      5 - 45 U/L 18   ALT      12 - 78 U/L 41   Alkaline Phosphatase      46 - 116 U/L 79   Total Protein      6 4 - 8 2 g/dL 7 2   Albumin      3 5 - 5 0 g/dL 4 2   TOTAL BILIRUBIN      0 20 - 1 00 mg/dL 0 42   eGFR      ml/min/1 73sq m 102   Cholesterol      50 - 200 mg/dL 99   Triglycerides      <=150 mg/dL 118   HDL      >=40 mg/dL 32 (L)   LDL Calculated      0 - 100 mg/dL 43   Non-HDL Cholesterol      mg/dl 67   Hemoglobin A1C      Normal 3 8-5 6%; PreDiabetic 5 7-6 4%;  Diabetic >=6 5%; Glycemic control for adults with diabetes <7 0% % 5 9 (H)   eAG, EST AVG Glucose      mg/dl 123   Vit D, 25-Hydroxy      30 0 - 100 0 ng/mL 29 6 (L)

## 2021-03-10 DIAGNOSIS — Z23 ENCOUNTER FOR IMMUNIZATION: ICD-10-CM

## 2021-03-18 ENCOUNTER — IMMUNIZATIONS (OUTPATIENT)
Dept: FAMILY MEDICINE CLINIC | Facility: HOSPITAL | Age: 51
End: 2021-03-18

## 2021-03-18 DIAGNOSIS — Z23 ENCOUNTER FOR IMMUNIZATION: Primary | ICD-10-CM

## 2021-03-18 PROCEDURE — 91300 SARS-COV-2 / COVID-19 MRNA VACCINE (PFIZER-BIONTECH) 30 MCG: CPT

## 2021-03-18 PROCEDURE — 0001A SARS-COV-2 / COVID-19 MRNA VACCINE (PFIZER-BIONTECH) 30 MCG: CPT

## 2021-04-07 DIAGNOSIS — Z79.4 TYPE 2 DIABETES MELLITUS WITH HYPERGLYCEMIA, WITH LONG-TERM CURRENT USE OF INSULIN (HCC): Primary | ICD-10-CM

## 2021-04-07 DIAGNOSIS — E11.65 TYPE 2 DIABETES MELLITUS WITH HYPERGLYCEMIA, WITH LONG-TERM CURRENT USE OF INSULIN (HCC): Primary | ICD-10-CM

## 2021-04-09 ENCOUNTER — IMMUNIZATIONS (OUTPATIENT)
Dept: FAMILY MEDICINE CLINIC | Facility: HOSPITAL | Age: 51
End: 2021-04-09

## 2021-04-09 DIAGNOSIS — Z23 ENCOUNTER FOR IMMUNIZATION: Primary | ICD-10-CM

## 2021-04-09 DIAGNOSIS — E11.65 TYPE 2 DIABETES MELLITUS WITH HYPERGLYCEMIA, WITH LONG-TERM CURRENT USE OF INSULIN (HCC): Primary | ICD-10-CM

## 2021-04-09 DIAGNOSIS — Z79.4 TYPE 2 DIABETES MELLITUS WITH HYPERGLYCEMIA, WITH LONG-TERM CURRENT USE OF INSULIN (HCC): Primary | ICD-10-CM

## 2021-04-09 PROCEDURE — 0002A SARS-COV-2 / COVID-19 MRNA VACCINE (PFIZER-BIONTECH) 30 MCG: CPT

## 2021-04-09 PROCEDURE — 91300 SARS-COV-2 / COVID-19 MRNA VACCINE (PFIZER-BIONTECH) 30 MCG: CPT

## 2021-04-09 RX ORDER — ATORVASTATIN CALCIUM 20 MG/1
TABLET, FILM COATED ORAL
Qty: 90 TABLET | Refills: 3 | Status: SHIPPED | OUTPATIENT
Start: 2021-04-09 | End: 2022-02-15 | Stop reason: ALTCHOICE

## 2021-04-09 RX ORDER — EMPAGLIFLOZIN, METFORMIN HYDROCHLORIDE 12.5; 1 MG/1; MG/1
TABLET, EXTENDED RELEASE ORAL
Qty: 180 TABLET | Refills: 3 | Status: SHIPPED | OUTPATIENT
Start: 2021-04-09 | End: 2022-03-31 | Stop reason: SDUPTHER

## 2021-04-12 RX ORDER — EMPAGLIFLOZIN, METFORMIN HYDROCHLORIDE 12.5; 1 MG/1; MG/1
TABLET, EXTENDED RELEASE ORAL
Qty: 180 TABLET | Refills: 1 | OUTPATIENT
Start: 2021-04-12

## 2021-04-12 RX ORDER — ATORVASTATIN CALCIUM 20 MG/1
TABLET, FILM COATED ORAL
Qty: 90 TABLET | Refills: 3 | OUTPATIENT
Start: 2021-04-12

## 2021-05-10 DIAGNOSIS — E11.65 TYPE 2 DIABETES MELLITUS WITH HYPERGLYCEMIA, WITH LONG-TERM CURRENT USE OF INSULIN (HCC): ICD-10-CM

## 2021-05-10 DIAGNOSIS — Z79.4 TYPE 2 DIABETES MELLITUS WITH HYPERGLYCEMIA, WITH LONG-TERM CURRENT USE OF INSULIN (HCC): ICD-10-CM

## 2021-05-11 RX ORDER — SEMAGLUTIDE 1.34 MG/ML
INJECTION, SOLUTION SUBCUTANEOUS
Qty: 9 ML | Refills: 0 | Status: SHIPPED | OUTPATIENT
Start: 2021-05-11 | End: 2022-03-31 | Stop reason: SDUPTHER

## 2021-05-13 ENCOUNTER — VBI (OUTPATIENT)
Dept: ADMINISTRATIVE | Facility: OTHER | Age: 51
End: 2021-05-13

## 2021-06-30 DIAGNOSIS — E11.65 TYPE 2 DIABETES MELLITUS WITH HYPERGLYCEMIA, WITH LONG-TERM CURRENT USE OF INSULIN (HCC): Primary | ICD-10-CM

## 2021-06-30 DIAGNOSIS — Z79.4 TYPE 2 DIABETES MELLITUS WITH HYPERGLYCEMIA, WITH LONG-TERM CURRENT USE OF INSULIN (HCC): Primary | ICD-10-CM

## 2021-07-02 DIAGNOSIS — Z79.4 TYPE 2 DIABETES MELLITUS WITH HYPERGLYCEMIA, WITH LONG-TERM CURRENT USE OF INSULIN (HCC): Primary | ICD-10-CM

## 2021-07-02 DIAGNOSIS — E11.65 TYPE 2 DIABETES MELLITUS WITH HYPERGLYCEMIA, WITH LONG-TERM CURRENT USE OF INSULIN (HCC): Primary | ICD-10-CM

## 2021-07-08 ENCOUNTER — APPOINTMENT (OUTPATIENT)
Dept: LAB | Facility: CLINIC | Age: 51
End: 2021-07-08
Payer: COMMERCIAL

## 2021-07-08 DIAGNOSIS — E78.00 PURE HYPERCHOLESTEROLEMIA: ICD-10-CM

## 2021-07-08 DIAGNOSIS — E11.65 TYPE 2 DIABETES MELLITUS WITH HYPERGLYCEMIA, WITH LONG-TERM CURRENT USE OF INSULIN (HCC): ICD-10-CM

## 2021-07-08 DIAGNOSIS — Z79.4 TYPE 2 DIABETES MELLITUS WITH HYPERGLYCEMIA, WITH LONG-TERM CURRENT USE OF INSULIN (HCC): ICD-10-CM

## 2021-07-08 DIAGNOSIS — I10 HYPERTENSION GOAL BP (BLOOD PRESSURE) < 140/90: ICD-10-CM

## 2021-07-08 LAB
ALBUMIN SERPL BCP-MCNC: 3.6 G/DL (ref 3.5–5)
ALP SERPL-CCNC: 78 U/L (ref 46–116)
ALT SERPL W P-5'-P-CCNC: 38 U/L (ref 12–78)
ANION GAP SERPL CALCULATED.3IONS-SCNC: 7 MMOL/L (ref 4–13)
AST SERPL W P-5'-P-CCNC: 23 U/L (ref 5–45)
BILIRUB SERPL-MCNC: 0.48 MG/DL (ref 0.2–1)
BUN SERPL-MCNC: 16 MG/DL (ref 5–25)
CALCIUM SERPL-MCNC: 8.8 MG/DL (ref 8.3–10.1)
CHLORIDE SERPL-SCNC: 106 MMOL/L (ref 100–108)
CHOLEST SERPL-MCNC: 100 MG/DL (ref 50–200)
CO2 SERPL-SCNC: 24 MMOL/L (ref 21–32)
CREAT SERPL-MCNC: 0.78 MG/DL (ref 0.6–1.3)
EST. AVERAGE GLUCOSE BLD GHB EST-MCNC: 126 MG/DL
GFR SERPL CREATININE-BSD FRML MDRD: 105 ML/MIN/1.73SQ M
GLUCOSE P FAST SERPL-MCNC: 101 MG/DL (ref 65–99)
HBA1C MFR BLD: 6 %
HDLC SERPL-MCNC: 31 MG/DL
LDLC SERPL CALC-MCNC: 50 MG/DL (ref 0–100)
NONHDLC SERPL-MCNC: 69 MG/DL
POTASSIUM SERPL-SCNC: 3.8 MMOL/L (ref 3.5–5.3)
PROT SERPL-MCNC: 7 G/DL (ref 6.4–8.2)
SODIUM SERPL-SCNC: 137 MMOL/L (ref 136–145)
TRIGL SERPL-MCNC: 97 MG/DL

## 2021-07-08 PROCEDURE — 80061 LIPID PANEL: CPT

## 2021-07-08 PROCEDURE — 80053 COMPREHEN METABOLIC PANEL: CPT

## 2021-07-08 PROCEDURE — 3044F HG A1C LEVEL LT 7.0%: CPT | Performed by: INTERNAL MEDICINE

## 2021-07-08 PROCEDURE — 83036 HEMOGLOBIN GLYCOSYLATED A1C: CPT

## 2021-07-08 PROCEDURE — 36415 COLL VENOUS BLD VENIPUNCTURE: CPT

## 2021-07-09 ENCOUNTER — OFFICE VISIT (OUTPATIENT)
Dept: ENDOCRINOLOGY | Facility: CLINIC | Age: 51
End: 2021-07-09
Payer: COMMERCIAL

## 2021-07-09 VITALS
HEART RATE: 81 BPM | DIASTOLIC BLOOD PRESSURE: 68 MMHG | HEIGHT: 69 IN | WEIGHT: 204 LBS | BODY MASS INDEX: 30.21 KG/M2 | TEMPERATURE: 97 F | SYSTOLIC BLOOD PRESSURE: 110 MMHG

## 2021-07-09 DIAGNOSIS — E78.00 PURE HYPERCHOLESTEROLEMIA: ICD-10-CM

## 2021-07-09 DIAGNOSIS — I10 HYPERTENSION GOAL BP (BLOOD PRESSURE) < 140/90: Primary | ICD-10-CM

## 2021-07-09 DIAGNOSIS — E55.9 VITAMIN D DEFICIENCY: ICD-10-CM

## 2021-07-09 DIAGNOSIS — E11.65 TYPE 2 DIABETES MELLITUS WITH HYPERGLYCEMIA, WITH LONG-TERM CURRENT USE OF INSULIN (HCC): ICD-10-CM

## 2021-07-09 DIAGNOSIS — Z79.4 TYPE 2 DIABETES MELLITUS WITH HYPERGLYCEMIA, WITH LONG-TERM CURRENT USE OF INSULIN (HCC): ICD-10-CM

## 2021-07-09 PROCEDURE — 99214 OFFICE O/P EST MOD 30 MIN: CPT | Performed by: INTERNAL MEDICINE

## 2021-07-09 PROCEDURE — 3078F DIAST BP <80 MM HG: CPT | Performed by: INTERNAL MEDICINE

## 2021-07-09 PROCEDURE — 3008F BODY MASS INDEX DOCD: CPT | Performed by: INTERNAL MEDICINE

## 2021-07-09 PROCEDURE — 4004F PT TOBACCO SCREEN RCVD TLK: CPT | Performed by: INTERNAL MEDICINE

## 2021-07-09 PROCEDURE — 3074F SYST BP LT 130 MM HG: CPT | Performed by: INTERNAL MEDICINE

## 2021-07-09 RX ORDER — AMOXICILLIN 500 MG/1
CAPSULE ORAL
COMMUNITY
End: 2022-02-15 | Stop reason: ALTCHOICE

## 2021-07-09 RX ORDER — NIACIN 1000 MG
TABLET, EXTENDED RELEASE ORAL
COMMUNITY
End: 2021-10-27 | Stop reason: ALTCHOICE

## 2021-07-09 RX ORDER — UBIDECARENONE 10 MG
10 CAPSULE ORAL DAILY
COMMUNITY

## 2021-07-09 RX ORDER — ACETAMINOPHEN AND CODEINE PHOSPHATE 300; 30 MG/1; MG/1
TABLET ORAL
COMMUNITY
End: 2022-02-15 | Stop reason: ALTCHOICE

## 2021-07-09 RX ORDER — ERGOCALCIFEROL (VITAMIN D2) 1250 MCG
50000 CAPSULE ORAL
COMMUNITY
End: 2022-02-15 | Stop reason: SDUPTHER

## 2021-07-09 NOTE — PROGRESS NOTES
ENDOCRINOLOGY  FOLLOW UP VISIT      Reason for Endocrine Consult/Chief Complaint: DM follow up     ? Medical Decision Making:     Impression  1  Type 2 Diabetes  2  HTN  3  HLD  4  Hives on Xolair idiopathic urticaria         Recommendations:  ?  BGs stable will continue same settings- new settings transferred to new [facility]   Counseled on treating hypoglycemia, he has glucose tablets       He uses the omnipod pump with novolog at the following settings:  Basal rates:  1 unit/hr all day   Total daily basal requiremetn 24 units/day     IC  8 all day      ISF 30      AIT 3 5 hours     BG target 120     Continue ozempic 1mg weekly Sundays  Counseled on adverse SEs including nausea, vomiting, risk of pancreatitis and risk of medullary thyroid CA, gallstones, cholecystitis  No FHx of MEN2  He understood these risk and wished to continue therapy       Continue Synjardy 12 5-1000mg BID AC  Counseled on adverse SEs including urinary frequency, risk of UTI and yeast infection, risk of Tegan's gangrene  He understood these risks and wished to continue therapy                HLD- triglycerides 97, LDL 50 July 2021, continue atorvastatin 20mg qday     HTN- well controlled continue amlodipine 5mg qday  Demetrius MCMILLAN  Endocrinology           History of Present Illness:   Mr Negrito Richardson is a 47year old male who presents for DM management-follow up    ? PMH-HTN, HLD, DM2, Hives on Xolair idiopathic urtricaria   PSH- vein ligation/stripping  FHx-father with diabetes and brother with diabetes  SHx-10-12 cigarettes a day, works at [employer]      Type of DM: 2  Age of onset: 11 years ago    Microvascular complications:none known   Macrovascular complications:none known      Event since last visit:     Remains on omnipod at same settings, on ozempic 1mg weekly sundays, synjardy 12 5-1000mg BID AC     BGs stable still exercising   ?   Review of Systems:     Review of Systems   Constitutional: Negative for appetite change, chills, diaphoresis, fatigue, fever and unexpected weight change  HENT: Negative for congestion, ear pain, hearing loss, rhinorrhea, sinus pressure, sinus pain, sore throat, trouble swallowing and voice change  Eyes: Negative for photophobia, redness and visual disturbance  Respiratory: Negative for apnea, cough, chest tightness, shortness of breath, wheezing and stridor  Cardiovascular: Negative for chest pain, palpitations and leg swelling  Gastrointestinal: Negative for abdominal distention, abdominal pain, constipation, diarrhea, nausea and vomiting  Endocrine: Negative for cold intolerance, heat intolerance, polydipsia, polyphagia and polyuria  Genitourinary: Negative for difficulty urinating, dysuria, flank pain, frequency, hematuria and urgency  Musculoskeletal: Negative for arthralgias, back pain, gait problem, joint swelling and myalgias  Skin: Negative for color change, pallor, rash and wound  Allergic/Immunologic: Negative for immunocompromised state  Neurological: Negative for dizziness, tremors, syncope, weakness, light-headedness and headaches  Hematological: Negative for adenopathy  Does not bruise/bleed easily  Psychiatric/Behavioral: Negative for confusion and sleep disturbance  The patient is not nervous/anxious  ?   Patient History:     Past Medical History:   Diagnosis Date    Diabetes mellitus ([facility] [address] )     Hives     Hyperlipidemia     Hypertension      Past Surgical History:   Procedure Laterality Date    COLONOSCOPY  2017    VEIN LIGATION AND STRIPPING Bilateral      Social History     Socioeconomic History    Marital status: /Civil Union     Spouse name: Not on file    Number of children: Not on file    Years of education: Not on file    Highest education level: Not on file   Occupational History    Occupation:    Tobacco Use    Smoking status: Light Tobacco Smoker     Packs/day: 0 50    Smokeless tobacco: Never Used    Tobacco comment: trying to quit 20 using Chantix   Vaping Use    Vaping Use: Never used   Substance and Sexual Activity    Alcohol use: No    Drug use: No    Sexual activity: Not on file   Other Topics Concern    Not on file   Social History Narrative    Most recent tobacco use [email or web address]    Do you currently or have you served in the [employer]: No    Were you activated, into active duty, as a member of the [employer] or as a Reservist: No    Occupation:     Marital status: Unknown    Sexual orientation: Heterosexual    Exercise level: Moderate    Diet: Regular    General stress level: Low    Alcohol intake: None    Caffeine intake: Moderate    Chewing tobacco: none    Illicit drugs: no    Guns present in home: No    Seat belts used routinely: Yes    Sunscreen used routinely: Yes    Smoke alarm in home: Yes    Advance directive: No    Salt Intake: yes     Social Determinants of Health     Financial Resource Strain:     Difficulty of Paying Living Expenses:    Food Insecurity:     Worried About Running Out of Food in the Last Year:     Ran Out of Food in the Last Year:    Transportation Needs:     Lack of Transportation (Medical):  Lack of Transportation (Non-Medical):    Physical Activity:     Days of Exercise per Week:     Minutes of Exercise per Session:    Stress:     Feeling of Stress :    Social Connections:     Frequency of Communication with Friends and Family:     Frequency of Social Gatherings with Friends and Family:     Attends Rastafarian Services:     Active Member of Clubs or Organizations:     Attends Club or Organization Meetings:     Marital Status:    Intimate Partner Violence:     Fear of Current or Ex-Partner:     Emotionally Abused:     Physically Abused:     Sexually Abused:      Family History   Problem Relation Age of Onset    Diabetes Father     Heart disease Father     Diabetes Brother        Current Medications:    At the time this note was written these were the medications the patient was on  Current Outpatient Medications   Medication Sig Dispense Refill    acetaminophen-codeine (TYLENOL with CODEINE #3) 300-30 MG per tablet acetaminophen 300 mg-codeine 30 mg tablet      amLODIPine (NORVASC) 5 mg tablet TAKE 1 TABLET BY MOUTH EVERY DAY 90 tablet 0    atorvastatin (LIPITOR) 20 mg tablet TAKE 1 TABLET BY MOUTH EVERY DAY AT NIGHT      b complex vitamins capsule Take 1 capsule by mouth daily      cetirizine (ZyrTEC) 5 MG tablet Take 40 mg by mouth daily   Cholecalciferol (Vitamin D3) 1 25 MG (92082 UT) CAPS       co-enzyme Q-10 30 MG capsule Take 30 mg by mouth 3 (three) times a day      Coenzyme Q10 10 MG capsule Take 10 mg by mouth daily      Continuous Blood Gluc Sensor (FreeStyle Yoly 14 Day Sensor) MISC Use 1 sensor every 14 days to monitor blood sugar 6 each 4    Continuous Blood Gluc Sensor (FreeStyle Yoly 14 Day Sensor) MISC 1 KIT BY MISCELLANEOUS ROUTE EVERY 14 (FOURTEEN) DAYS   Empagliflozin-metFORMIN HCl ER (Synjardy XR) 12 5-1000 MG TB24 Take one tablet (125-1000 mg) two times a day with meals 180 tablet 3    EPINEPHrine (EpiPen 2-Neeraj) 0 3 mg/0 3 mL SOAJ EpiPen 2-Neeraj 0 3 mg/0 3 mL injection, auto-injector      ergocalciferol (ERGOCALCIFEROL) 1 25 MG (29270 UT) capsule Take 50,000 Units by mouth      ergocalciferol (VITAMIN D2) 50,000 units TAKE 1 CAPSULE WEEKLY WITH DINNER      insulin aspart (NovoLOG) 100 units/mL injection Use up to 60 units via pump daily 40 mL 0    multivitamin (THERAGRAN) TABS Take 1 tablet by mouth daily   Niacin ER 1000 MG TBCR niacin ER 1,000 mg tablet,extended release 24 hr      NovoLOG 100 UNIT/ML injection USE 60U VIA PUMP DAILY 10 mL 3    Omalizumab (XOLAIR SC) Inject under the skin every 30 (thirty) days       Ozempic, 1 MG/DOSE, 2 MG/1 5ML SOPN INJECT 1 MG UNDER THE SKIN ONCE A WEEK   9 mL 0    varenicline (CHANTIX) 0 5 mg tablet Take 0 5 mg by mouth as needed for smoking cessation   amoxicillin (AMOXIL) 500 mg capsule amoxicillin 500 mg capsule (Patient not taking: Reported on 7/9/2021)      atorvastatin (LIPITOR) 20 mg tablet Take one tablet (20 mg) every night (Patient not taking: Reported on 7/9/2021) 90 tablet 3     No current facility-administered medications for this visit  Allergies: Aspirin, Crestor [rosuvastatin], Invokana [canagliflozin], Janumet [sitagliptin-metformin hcl], and Sitagliptin    Physical Exam:   Vital Signs:   /68   Pulse 81   Temp (!) 97 °F (36 1 °C)   Ht 5' 9" (1 753 m)   Wt 92 5 kg (204 lb)   BMI 30 13 kg/m²     Physical Exam  Vitals reviewed  Constitutional:       General: He is not in acute distress  Appearance: Normal appearance  He is not ill-appearing, toxic-appearing or diaphoretic  HENT:      Head: Normocephalic and atraumatic  Right Ear: External ear normal       Left Ear: External ear normal       Nose: Nose normal    Eyes:      General: No scleral icterus  Extraocular Movements: Extraocular movements intact  Conjunctiva/sclera: Conjunctivae normal    Neck:      Comments: No thyromegaly or nodules  Cardiovascular:      Rate and Rhythm: Normal rate and regular rhythm  Heart sounds: Normal heart sounds  No murmur heard  No friction rub  No gallop  Pulmonary:      Effort: Pulmonary effort is normal  No respiratory distress  Breath sounds: Normal breath sounds  No stridor  No wheezing, rhonchi or rales  Abdominal:      General: Bowel sounds are normal  There is no distension  Palpations: Abdomen is soft  There is no mass  Tenderness: There is no abdominal tenderness  There is no guarding or rebound  Hernia: No hernia is present  Musculoskeletal:         General: No swelling or tenderness  Normal range of motion  Cervical back: Normal range of motion and neck supple  No tenderness  Lymphadenopathy:      Cervical: No cervical adenopathy     Skin:     General: Skin is warm and dry  Coloration: Skin is not pale  Findings: No erythema or rash  Neurological:      General: No focal deficit present  Mental Status: He is alert and oriented to person, place, and time  Psychiatric:         Mood and Affect: Mood normal          Behavior: Behavior normal          Thought Content: Thought content normal          Judgment: Judgment normal           Labs and Imaging:      Component      Latest Ref Rng & Units 7/8/2021   Sodium      136 - 145 mmol/L 137   Potassium      3 5 - 5 3 mmol/L 3 8   Chloride      100 - 108 mmol/L 106   CO2      21 - 32 mmol/L 24   Anion Gap      4 - 13 mmol/L 7   BUN      5 - 25 mg/dL 16   Creatinine      0 60 - 1 30 mg/dL 0 78   GLUCOSE FASTING      65 - 99 mg/dL 101 (H)   Calcium      8 3 - 10 1 mg/dL 8 8   AST      5 - 45 U/L 23   ALT      12 - 78 U/L 38   Alkaline Phosphatase      46 - 116 U/L 78   Total Protein      6 4 - 8 2 g/dL 7 0   Albumin      3 5 - 5 0 g/dL 3 6   TOTAL BILIRUBIN      0 20 - 1 00 mg/dL 0 48   eGFR      ml/min/1 73sq m 105   Cholesterol      50 - 200 mg/dL 100   Triglycerides      <=150 mg/dL 97   HDL      >=40 mg/dL 31 (L)   LDL Calculated      0 - 100 mg/dL 50   Non-HDL Cholesterol      mg/dl 69   Hemoglobin A1C      Normal 3 8-5 6%; PreDiabetic 5 7-6 4%;  Diabetic >=6 5%; Glycemic control for adults with diabetes <7 0% % 6 0 (H)   eAG, EST AVG Glucose      mg/dl 126

## 2021-09-09 ENCOUNTER — VBI (OUTPATIENT)
Dept: ADMINISTRATIVE | Facility: OTHER | Age: 51
End: 2021-09-09

## 2021-09-09 NOTE — TELEPHONE ENCOUNTER
09/09/21 10:25 AM     See documentation in the [employer] SmartForm       [facility] Luisa

## 2021-09-10 ENCOUNTER — TELEPHONE (OUTPATIENT)
Dept: ENDOCRINOLOGY | Facility: CLINIC | Age: 51
End: 2021-09-10

## 2021-10-22 ENCOUNTER — TELEPHONE (OUTPATIENT)
Dept: ENDOCRINOLOGY | Facility: CLINIC | Age: 51
End: 2021-10-22

## 2021-10-22 ENCOUNTER — LAB (OUTPATIENT)
Dept: LAB | Facility: CLINIC | Age: 51
End: 2021-10-22
Payer: COMMERCIAL

## 2021-10-22 DIAGNOSIS — E78.00 PURE HYPERCHOLESTEROLEMIA: ICD-10-CM

## 2021-10-22 DIAGNOSIS — E11.65 TYPE 2 DIABETES MELLITUS WITH HYPERGLYCEMIA, WITH LONG-TERM CURRENT USE OF INSULIN (HCC): ICD-10-CM

## 2021-10-22 DIAGNOSIS — Z79.4 TYPE 2 DIABETES MELLITUS WITH HYPERGLYCEMIA, WITH LONG-TERM CURRENT USE OF INSULIN (HCC): ICD-10-CM

## 2021-10-22 DIAGNOSIS — E55.9 VITAMIN D DEFICIENCY: ICD-10-CM

## 2021-10-22 DIAGNOSIS — I10 HYPERTENSION GOAL BP (BLOOD PRESSURE) < 140/90: ICD-10-CM

## 2021-10-22 LAB
ALBUMIN SERPL BCP-MCNC: 3.8 G/DL (ref 3.5–5)
ALP SERPL-CCNC: 81 U/L (ref 46–116)
ALT SERPL W P-5'-P-CCNC: 46 U/L (ref 12–78)
ANION GAP SERPL CALCULATED.3IONS-SCNC: 3 MMOL/L (ref 4–13)
AST SERPL W P-5'-P-CCNC: 26 U/L (ref 5–45)
BILIRUB SERPL-MCNC: 0.72 MG/DL (ref 0.2–1)
BUN SERPL-MCNC: 15 MG/DL (ref 5–25)
CALCIUM SERPL-MCNC: 9.2 MG/DL (ref 8.3–10.1)
CHLORIDE SERPL-SCNC: 106 MMOL/L (ref 100–108)
CHOLEST SERPL-MCNC: 119 MG/DL (ref 50–200)
CO2 SERPL-SCNC: 28 MMOL/L (ref 21–32)
CREAT SERPL-MCNC: 0.81 MG/DL (ref 0.6–1.3)
EST. AVERAGE GLUCOSE BLD GHB EST-MCNC: 148 MG/DL
GFR SERPL CREATININE-BSD FRML MDRD: 103 ML/MIN/1.73SQ M
GLUCOSE P FAST SERPL-MCNC: 133 MG/DL (ref 65–99)
HBA1C MFR BLD: 6.8 %
HDLC SERPL-MCNC: 34 MG/DL
LDLC SERPL CALC-MCNC: 62 MG/DL (ref 0–100)
NONHDLC SERPL-MCNC: 85 MG/DL
POTASSIUM SERPL-SCNC: 4 MMOL/L (ref 3.5–5.3)
PROT SERPL-MCNC: 6.9 G/DL (ref 6.4–8.2)
SODIUM SERPL-SCNC: 137 MMOL/L (ref 136–145)
TRIGL SERPL-MCNC: 117 MG/DL

## 2021-10-22 PROCEDURE — 83036 HEMOGLOBIN GLYCOSYLATED A1C: CPT

## 2021-10-22 PROCEDURE — 36415 COLL VENOUS BLD VENIPUNCTURE: CPT

## 2021-10-22 PROCEDURE — 3044F HG A1C LEVEL LT 7.0%: CPT | Performed by: INTERNAL MEDICINE

## 2021-10-22 PROCEDURE — 80053 COMPREHEN METABOLIC PANEL: CPT

## 2021-10-22 PROCEDURE — 80061 LIPID PANEL: CPT

## 2021-10-25 ENCOUNTER — TELEPHONE (OUTPATIENT)
Dept: ENDOCRINOLOGY | Facility: CLINIC | Age: 51
End: 2021-10-25

## 2021-10-25 ENCOUNTER — OFFICE VISIT (OUTPATIENT)
Dept: ENDOCRINOLOGY | Facility: CLINIC | Age: 51
End: 2021-10-25
Payer: COMMERCIAL

## 2021-10-25 VITALS
WEIGHT: 209 LBS | SYSTOLIC BLOOD PRESSURE: 122 MMHG | HEART RATE: 88 BPM | DIASTOLIC BLOOD PRESSURE: 80 MMHG | BODY MASS INDEX: 30.96 KG/M2 | HEIGHT: 69 IN | TEMPERATURE: 97 F

## 2021-10-25 DIAGNOSIS — E11.65 TYPE 2 DIABETES MELLITUS WITH HYPERGLYCEMIA, WITH LONG-TERM CURRENT USE OF INSULIN (HCC): Primary | ICD-10-CM

## 2021-10-25 DIAGNOSIS — I10 PRIMARY HYPERTENSION: ICD-10-CM

## 2021-10-25 DIAGNOSIS — Z79.4 TYPE 2 DIABETES MELLITUS WITH HYPERGLYCEMIA, WITH LONG-TERM CURRENT USE OF INSULIN (HCC): Primary | ICD-10-CM

## 2021-10-25 DIAGNOSIS — E78.00 PURE HYPERCHOLESTEROLEMIA: ICD-10-CM

## 2021-10-25 DIAGNOSIS — E16.2 HYPOGLYCEMIA: ICD-10-CM

## 2021-10-25 PROCEDURE — 3008F BODY MASS INDEX DOCD: CPT | Performed by: INTERNAL MEDICINE

## 2021-10-25 PROCEDURE — 3074F SYST BP LT 130 MM HG: CPT | Performed by: INTERNAL MEDICINE

## 2021-10-25 PROCEDURE — 4004F PT TOBACCO SCREEN RCVD TLK: CPT | Performed by: INTERNAL MEDICINE

## 2021-10-25 PROCEDURE — 99215 OFFICE O/P EST HI 40 MIN: CPT | Performed by: INTERNAL MEDICINE

## 2021-10-25 PROCEDURE — 3079F DIAST BP 80-89 MM HG: CPT | Performed by: INTERNAL MEDICINE

## 2021-10-25 RX ORDER — OMALIZUMAB 150 MG/ML
INJECTION, SOLUTION SUBCUTANEOUS
COMMUNITY
Start: 2021-10-14 | End: 2022-02-15 | Stop reason: SDUPTHER

## 2021-10-25 RX ORDER — SEMAGLUTIDE 1.34 MG/ML
INJECTION, SOLUTION SUBCUTANEOUS
COMMUNITY
End: 2022-02-15 | Stop reason: ALTCHOICE

## 2021-10-26 ENCOUNTER — TELEPHONE (OUTPATIENT)
Dept: ENDOCRINOLOGY | Facility: CLINIC | Age: 51
End: 2021-10-26

## 2021-11-03 ENCOUNTER — VBI (OUTPATIENT)
Dept: ADMINISTRATIVE | Facility: OTHER | Age: 51
End: 2021-11-03

## 2021-12-16 DIAGNOSIS — Z79.4 TYPE 2 DIABETES MELLITUS WITH HYPERGLYCEMIA, WITH LONG-TERM CURRENT USE OF INSULIN (HCC): ICD-10-CM

## 2021-12-16 DIAGNOSIS — E11.65 TYPE 2 DIABETES MELLITUS WITH HYPERGLYCEMIA, WITH LONG-TERM CURRENT USE OF INSULIN (HCC): ICD-10-CM

## 2021-12-16 RX ORDER — FLASH GLUCOSE SENSOR
KIT MISCELLANEOUS
Qty: 6 EACH | Refills: 4 | Status: SHIPPED | OUTPATIENT
Start: 2021-12-16

## 2022-01-07 DIAGNOSIS — I10 HYPERTENSION GOAL BP (BLOOD PRESSURE) < 140/90: ICD-10-CM

## 2022-01-07 RX ORDER — AMLODIPINE BESYLATE 5 MG/1
5 TABLET ORAL DAILY
Qty: 90 TABLET | Refills: 1 | Status: SHIPPED | OUTPATIENT
Start: 2022-01-07 | End: 2022-07-05

## 2022-01-07 NOTE — TELEPHONE ENCOUNTER
Patients wife advised that this was being filled by patients Endo however he has a new Endo that is refusing to fill it  Hoping you will take over the medication?

## 2022-01-11 ENCOUNTER — VBI (OUTPATIENT)
Dept: ADMINISTRATIVE | Facility: OTHER | Age: 52
End: 2022-01-11

## 2022-02-14 ENCOUNTER — TELEPHONE (OUTPATIENT)
Dept: FAMILY MEDICINE CLINIC | Facility: CLINIC | Age: 52
End: 2022-02-14

## 2022-02-14 ENCOUNTER — HOSPITAL ENCOUNTER (EMERGENCY)
Facility: HOSPITAL | Age: 52
Discharge: LEFT BEFORE DISCHARGE | End: 2022-02-14
Payer: COMMERCIAL

## 2022-02-14 VITALS
SYSTOLIC BLOOD PRESSURE: 137 MMHG | RESPIRATION RATE: 18 BRPM | HEART RATE: 88 BPM | HEIGHT: 69 IN | BODY MASS INDEX: 31.84 KG/M2 | TEMPERATURE: 98 F | WEIGHT: 215 LBS | DIASTOLIC BLOOD PRESSURE: 70 MMHG | OXYGEN SATURATION: 98 %

## 2022-02-14 LAB
ALBUMIN SERPL BCP-MCNC: 4.1 G/DL (ref 3.5–5)
ALP SERPL-CCNC: 95 U/L (ref 46–116)
ALT SERPL W P-5'-P-CCNC: 35 U/L (ref 12–78)
ANION GAP SERPL CALCULATED.3IONS-SCNC: 12 MMOL/L (ref 4–13)
AST SERPL W P-5'-P-CCNC: 19 U/L (ref 5–45)
BASOPHILS # BLD AUTO: 0.11 THOUSANDS/ΜL (ref 0–0.1)
BASOPHILS NFR BLD AUTO: 1 % (ref 0–1)
BILIRUB SERPL-MCNC: 0.35 MG/DL (ref 0.2–1)
BUN SERPL-MCNC: 13 MG/DL (ref 5–25)
CALCIUM SERPL-MCNC: 9.1 MG/DL (ref 8.3–10.1)
CARDIAC TROPONIN I PNL SERPL HS: 3 NG/L
CHLORIDE SERPL-SCNC: 100 MMOL/L (ref 100–108)
CO2 SERPL-SCNC: 21 MMOL/L (ref 21–32)
CREAT SERPL-MCNC: 0.9 MG/DL (ref 0.6–1.3)
EOSINOPHIL # BLD AUTO: 0.15 THOUSAND/ΜL (ref 0–0.61)
EOSINOPHIL NFR BLD AUTO: 1 % (ref 0–6)
ERYTHROCYTE [DISTWIDTH] IN BLOOD BY AUTOMATED COUNT: 14.3 % (ref 11.6–15.1)
GFR SERPL CREATININE-BSD FRML MDRD: 97 ML/MIN/1.73SQ M
GLUCOSE SERPL-MCNC: 240 MG/DL (ref 65–140)
HCT VFR BLD AUTO: 49.8 % (ref 36.5–49.3)
HGB BLD-MCNC: 17 G/DL (ref 12–17)
IMM GRANULOCYTES # BLD AUTO: 0.25 THOUSAND/UL (ref 0–0.2)
IMM GRANULOCYTES NFR BLD AUTO: 1 % (ref 0–2)
LYMPHOCYTES # BLD AUTO: 2.17 THOUSANDS/ΜL (ref 0.6–4.47)
LYMPHOCYTES NFR BLD AUTO: 12 % (ref 14–44)
MCH RBC QN AUTO: 28.7 PG (ref 26.8–34.3)
MCHC RBC AUTO-ENTMCNC: 34.1 G/DL (ref 31.4–37.4)
MCV RBC AUTO: 84 FL (ref 82–98)
MONOCYTES # BLD AUTO: 0.63 THOUSAND/ΜL (ref 0.17–1.22)
MONOCYTES NFR BLD AUTO: 4 % (ref 4–12)
NEUTROPHILS # BLD AUTO: 14.43 THOUSANDS/ΜL (ref 1.85–7.62)
NEUTS SEG NFR BLD AUTO: 81 % (ref 43–75)
NRBC BLD AUTO-RTO: 0 /100 WBCS
PLATELET # BLD AUTO: 290 THOUSANDS/UL (ref 149–390)
PMV BLD AUTO: 10.8 FL (ref 8.9–12.7)
POTASSIUM SERPL-SCNC: 4.2 MMOL/L (ref 3.5–5.3)
PROT SERPL-MCNC: 7.5 G/DL (ref 6.4–8.2)
RBC # BLD AUTO: 5.92 MILLION/UL (ref 3.88–5.62)
SODIUM SERPL-SCNC: 133 MMOL/L (ref 136–145)
WBC # BLD AUTO: 17.74 THOUSAND/UL (ref 4.31–10.16)

## 2022-02-14 PROCEDURE — 84484 ASSAY OF TROPONIN QUANT: CPT

## 2022-02-14 PROCEDURE — 80053 COMPREHEN METABOLIC PANEL: CPT

## 2022-02-14 PROCEDURE — 36415 COLL VENOUS BLD VENIPUNCTURE: CPT

## 2022-02-14 PROCEDURE — 85025 COMPLETE CBC W/AUTO DIFF WBC: CPT

## 2022-02-14 NOTE — TELEPHONE ENCOUNTER
Patients wife called and advised that for 3 days her  has not been feeling well but today he called her from work and advised he is very dizzy having trouble seeing and feels as though he is leaning to one side  I advised Erica Flynn to take him to the ER as he is diabetic and looks like at one point he was on HTN medication  Patient has not seen Dr Melinda Goldman since 2020

## 2022-02-15 ENCOUNTER — TELEMEDICINE (OUTPATIENT)
Dept: FAMILY MEDICINE CLINIC | Facility: CLINIC | Age: 52
End: 2022-02-15
Payer: COMMERCIAL

## 2022-02-15 VITALS — WEIGHT: 215 LBS | BODY MASS INDEX: 31.84 KG/M2 | HEIGHT: 69 IN

## 2022-02-15 DIAGNOSIS — D72.829 LEUKOCYTOSIS, UNSPECIFIED TYPE: ICD-10-CM

## 2022-02-15 DIAGNOSIS — J01.00 ACUTE NON-RECURRENT MAXILLARY SINUSITIS: Primary | ICD-10-CM

## 2022-02-15 PROCEDURE — 99213 OFFICE O/P EST LOW 20 MIN: CPT | Performed by: FAMILY MEDICINE

## 2022-02-15 PROCEDURE — 3008F BODY MASS INDEX DOCD: CPT | Performed by: FAMILY MEDICINE

## 2022-02-15 PROCEDURE — 4004F PT TOBACCO SCREEN RCVD TLK: CPT | Performed by: FAMILY MEDICINE

## 2022-02-15 PROCEDURE — 3725F SCREEN DEPRESSION PERFORMED: CPT | Performed by: FAMILY MEDICINE

## 2022-02-15 RX ORDER — AMOXICILLIN AND CLAVULANATE POTASSIUM 875; 125 MG/1; MG/1
1 TABLET, FILM COATED ORAL EVERY 12 HOURS SCHEDULED
Qty: 20 TABLET | Refills: 0 | Status: SHIPPED | OUTPATIENT
Start: 2022-02-15 | End: 2022-02-25

## 2022-02-15 NOTE — PROGRESS NOTES
Virtual Regular Visit    Verification of patient location:    Patient is located in the following state in which I hold an active license [place]      Assessment/Plan:    Lets check the cbc again in 2 days  Should be coming down  If things worsen to the ER I dont care how many people are there   You will need a CT scan and IV abx I would think      Problem List Items Addressed This Visit     None      Visit Diagnoses     Acute non-recurrent maxillary sinusitis    -  Primary    Relevant Medications    amoxicillin-clavulanate (Augmentin) 875-125 mg per tablet    Other Relevant Orders    CBC and differential    Leukocytosis, unspecified type        Relevant Medications    amoxicillin-clavulanate (Augmentin) 875-125 mg per tablet    Other Relevant Orders    CBC and differential               Reason for visit is   Chief Complaint   Patient presents with    Sinus Problem     Recheck from ER    Virtual Regular Visit        Encounter provider Ada Dunne MD    Provider located at [address] [place] [id number]      Recent Visits  Date Type Provider Dept   02/14/22 Telephone [facility] 39 recent visits within past 7 days and meeting all other requirements  Today's Visits  Date Type Provider Dept   02/15/22 Telemedicine Ada Dunne MD [facility]   Showing today's visits and meeting all other requirements  Future Appointments  No visits were found meeting these conditions  Showing future appointments within next 150 days and meeting all other requirements       The patient was identified by name and date of birth  Daniel Conshohocken was informed that this is a telemedicine visit and that the visit is being conducted through [address] [place] [address] Now and patient was informed that this is a secure, HIPAA-compliant platform  He agrees to proceed     My office door was closed  No one else was in the room    He acknowledged consent and understanding of privacy and security of the video platform  The patient has agreed to participate and understands they can discontinue the visit at any time  Patient is aware this is a billable service  Subjective  Marleen Maldonado is a 45 y o  male    HPI     Low engery couple days   Working still   Felt dizzy   Checked bg 120   layed down couple mins  Then happened again     Nausea also   Then diarrhea   Felt a bit better but then dizzy   layed down again   Still dizzy 8am to 11am   To the ER and did the BW and left     Facial pain by the eye and coughing still       Past Medical History:   Diagnosis Date    Diabetes mellitus ([address] )     Hives     Hyperlipidemia     Hypertension        Past Surgical History:   Procedure Laterality Date    COLONOSCOPY  2017    VEIN LIGATION AND STRIPPING Bilateral        Current Outpatient Medications   Medication Sig Dispense Refill    amLODIPine (NORVASC) 5 mg tablet Take 1 tablet (5 mg total) by mouth daily 90 tablet 1    atorvastatin (LIPITOR) 20 mg tablet TAKE 1 TABLET BY MOUTH EVERY DAY AT NIGHT      b complex vitamins capsule Take 1 capsule by mouth daily      Coenzyme Q10 10 MG capsule Take 10 mg by mouth daily      Empagliflozin-metFORMIN HCl ER (Synjardy XR) 12 5-1000 MG TB24 Take one tablet (125-1000 mg) two times a day with meals 180 tablet 3    EPINEPHrine (EpiPen 2-Neeraj) 0 3 mg/0 3 mL SOAJ EpiPen 2-Neeraj 0 3 mg/0 3 mL injection, auto-injector      multivitamin (THERAGRAN) TABS Take 1 tablet by mouth daily   NovoLOG 100 UNIT/ML injection USE 60U VIA PUMP DAILY 10 mL 3    Omalizumab (XOLAIR SC) Inject under the skin every 30 (thirty) days       Ozempic, 1 MG/DOSE, 2 MG/1 5ML SOPN INJECT 1 MG UNDER THE SKIN ONCE A WEEK   9 mL 0    amoxicillin-clavulanate (Augmentin) 875-125 mg per tablet Take 1 tablet by mouth every 12 (twelve) hours for 10 days 20 tablet 0    Continuous Blood Gluc Sensor (FreeStyle Yoly 14 Day Sensor) MISC 1 KIT BY MISCELLANEOUS ROUTE EVERY 14 (FOURTEEN) DAYS       Continuous Blood Gluc Sensor (FreeStyle Yoly 14 Day Sensor) MISC USE 1 SENSOR EVERY 14 DAYS TO MONITOR BLOOD SUGAR 6 each 4     No current facility-administered medications for this visit  Allergies   Allergen Reactions    Aspirin      Possible hives reaction    Crestor [Rosuvastatin] Hives    Invokana [Canagliflozin] Hives    Janumet [Sitagliptin-Metformin Hcl] Hives    Sitagliptin Hives       Review of Systems   Constitutional: Negative for activity change, appetite change and fever  HENT: Positive for sinus pain  Negative for congestion, nosebleeds and trouble swallowing  Eyes: Negative for itching  Respiratory: Positive for cough  Negative for chest tightness  Cardiovascular: Negative for chest pain and palpitations  Gastrointestinal: Negative for abdominal pain, constipation, diarrhea and nausea  Endocrine: Negative for cold intolerance  Genitourinary: Negative for frequency  Musculoskeletal: Negative for gait problem and joint swelling  Skin: Negative for rash  Allergic/Immunologic: Negative for immunocompromised state  Neurological: Positive for dizziness  Negative for tremors, seizures, syncope and headaches  Psychiatric/Behavioral: Negative for hallucinations and suicidal ideas  Video Exam    Vitals:    02/15/22 1347   Weight: 97 5 kg (215 lb)   Height: 5' 9" (1 753 m)       Physical Exam  Vitals and nursing note reviewed  Constitutional:       General: He is not in acute distress  Appearance: He is well-developed  HENT:      Head: Normocephalic and atraumatic  Cardiovascular:      Rate and Rhythm: Normal rate and regular rhythm  Heart sounds: Normal heart sounds  No murmur heard  Pulmonary:      Effort: Pulmonary effort is normal       Breath sounds: Normal breath sounds  No wheezing or rales  Abdominal:      General: Bowel sounds are normal  There is no distension  Palpations: Abdomen is soft  Tenderness:  There is no abdominal tenderness  There is no guarding or rebound  Musculoskeletal:         General: No tenderness  Normal range of motion  Cervical back: Normal range of motion and neck supple  Lymphadenopathy:      Cervical: No cervical adenopathy  Skin:     General: Skin is warm and dry  Capillary Refill: Capillary refill takes less than 2 seconds  Findings: No rash  Neurological:      Mental Status: He is alert and oriented to person, place, and time  Cranial Nerves: No cranial nerve deficit  Sensory: No sensory deficit  Motor: No abnormal muscle tone  Psychiatric:         Behavior: Behavior normal          Thought Content: Thought content normal          Judgment: Judgment normal           I spent 10 minutes directly with the patient during this visit    [facility] verbally agrees to participate in [employer]  Pt is aware that [employer] could be limited without vital signs or the ability to perform a full hands-on physical exam  Marleen Campbell understands he or the provider may request at any time to terminate the video visit and request the patient to seek care or treatment in person

## 2022-02-18 ENCOUNTER — APPOINTMENT (OUTPATIENT)
Dept: LAB | Facility: CLINIC | Age: 52
End: 2022-02-18
Payer: COMMERCIAL

## 2022-02-18 DIAGNOSIS — J01.00 ACUTE NON-RECURRENT MAXILLARY SINUSITIS: ICD-10-CM

## 2022-02-18 DIAGNOSIS — D72.829 LEUKOCYTOSIS, UNSPECIFIED TYPE: ICD-10-CM

## 2022-02-18 LAB
BASOPHILS # BLD AUTO: 0.15 THOUSANDS/ΜL (ref 0–0.1)
BASOPHILS NFR BLD AUTO: 1 % (ref 0–1)
EOSINOPHIL # BLD AUTO: 0.63 THOUSAND/ΜL (ref 0–0.61)
EOSINOPHIL NFR BLD AUTO: 4 % (ref 0–6)
ERYTHROCYTE [DISTWIDTH] IN BLOOD BY AUTOMATED COUNT: 14.2 % (ref 11.6–15.1)
HCT VFR BLD AUTO: 50.3 % (ref 36.5–49.3)
HGB BLD-MCNC: 17 G/DL (ref 12–17)
IMM GRANULOCYTES # BLD AUTO: 0.07 THOUSAND/UL (ref 0–0.2)
IMM GRANULOCYTES NFR BLD AUTO: 0 % (ref 0–2)
LYMPHOCYTES # BLD AUTO: 4.61 THOUSANDS/ΜL (ref 0.6–4.47)
LYMPHOCYTES NFR BLD AUTO: 29 % (ref 14–44)
MCH RBC QN AUTO: 28.3 PG (ref 26.8–34.3)
MCHC RBC AUTO-ENTMCNC: 33.8 G/DL (ref 31.4–37.4)
MCV RBC AUTO: 84 FL (ref 82–98)
MONOCYTES # BLD AUTO: 1.2 THOUSAND/ΜL (ref 0.17–1.22)
MONOCYTES NFR BLD AUTO: 8 % (ref 4–12)
NEUTROPHILS # BLD AUTO: 9.38 THOUSANDS/ΜL (ref 1.85–7.62)
NEUTS SEG NFR BLD AUTO: 58 % (ref 43–75)
NRBC BLD AUTO-RTO: 0 /100 WBCS
PLATELET # BLD AUTO: 286 THOUSANDS/UL (ref 149–390)
PMV BLD AUTO: 11.4 FL (ref 8.9–12.7)
RBC # BLD AUTO: 6.01 MILLION/UL (ref 3.88–5.62)
WBC # BLD AUTO: 16.04 THOUSAND/UL (ref 4.31–10.16)

## 2022-02-18 PROCEDURE — 36415 COLL VENOUS BLD VENIPUNCTURE: CPT

## 2022-02-18 PROCEDURE — 85025 COMPLETE CBC W/AUTO DIFF WBC: CPT

## 2022-02-24 ENCOUNTER — TELEPHONE (OUTPATIENT)
Dept: FAMILY MEDICINE CLINIC | Facility: CLINIC | Age: 52
End: 2022-02-24

## 2022-03-03 ENCOUNTER — APPOINTMENT (OUTPATIENT)
Dept: LAB | Facility: CLINIC | Age: 52
End: 2022-03-03
Payer: COMMERCIAL

## 2022-03-03 DIAGNOSIS — Z79.4 TYPE 2 DIABETES MELLITUS WITH HYPERGLYCEMIA, WITH LONG-TERM CURRENT USE OF INSULIN (HCC): ICD-10-CM

## 2022-03-03 DIAGNOSIS — E11.65 TYPE 2 DIABETES MELLITUS WITH HYPERGLYCEMIA, WITH LONG-TERM CURRENT USE OF INSULIN (HCC): ICD-10-CM

## 2022-03-03 DIAGNOSIS — E78.00 PURE HYPERCHOLESTEROLEMIA: ICD-10-CM

## 2022-03-03 DIAGNOSIS — E16.2 HYPOGLYCEMIA: ICD-10-CM

## 2022-03-03 DIAGNOSIS — D72.829 LEUKOCYTOSIS, UNSPECIFIED TYPE: ICD-10-CM

## 2022-03-03 LAB
ANION GAP SERPL CALCULATED.3IONS-SCNC: 7 MMOL/L (ref 4–13)
BASOPHILS # BLD AUTO: 0.16 THOUSANDS/ΜL (ref 0–0.1)
BASOPHILS NFR BLD AUTO: 1 % (ref 0–1)
BUN SERPL-MCNC: 14 MG/DL (ref 5–25)
CALCIUM SERPL-MCNC: 9 MG/DL (ref 8.3–10.1)
CHLORIDE SERPL-SCNC: 105 MMOL/L (ref 100–108)
CHOLEST SERPL-MCNC: 123 MG/DL
CO2 SERPL-SCNC: 25 MMOL/L (ref 21–32)
CREAT SERPL-MCNC: 0.82 MG/DL (ref 0.6–1.3)
CREAT UR-MCNC: 27.2 MG/DL
CRP SERPL QL: <3 MG/L
EOSINOPHIL # BLD AUTO: 0.7 THOUSAND/ΜL (ref 0–0.61)
EOSINOPHIL NFR BLD AUTO: 4 % (ref 0–6)
ERYTHROCYTE [DISTWIDTH] IN BLOOD BY AUTOMATED COUNT: 14.2 % (ref 11.6–15.1)
ERYTHROCYTE [SEDIMENTATION RATE] IN BLOOD: 8 MM/HOUR (ref 0–19)
EST. AVERAGE GLUCOSE BLD GHB EST-MCNC: 151 MG/DL
GFR SERPL CREATININE-BSD FRML MDRD: 101 ML/MIN/1.73SQ M
GLUCOSE P FAST SERPL-MCNC: 138 MG/DL (ref 65–99)
HBA1C MFR BLD: 6.9 %
HCT VFR BLD AUTO: 50.9 % (ref 36.5–49.3)
HDLC SERPL-MCNC: 35 MG/DL
HGB BLD-MCNC: 17.2 G/DL (ref 12–17)
IMM GRANULOCYTES # BLD AUTO: 0.1 THOUSAND/UL (ref 0–0.2)
IMM GRANULOCYTES NFR BLD AUTO: 1 % (ref 0–2)
LDLC SERPL CALC-MCNC: 65 MG/DL (ref 0–100)
LYMPHOCYTES # BLD AUTO: 4.94 THOUSANDS/ΜL (ref 0.6–4.47)
LYMPHOCYTES NFR BLD AUTO: 31 % (ref 14–44)
MCH RBC QN AUTO: 28.4 PG (ref 26.8–34.3)
MCHC RBC AUTO-ENTMCNC: 33.8 G/DL (ref 31.4–37.4)
MCV RBC AUTO: 84 FL (ref 82–98)
MICROALBUMIN UR-MCNC: 8.7 MG/L (ref 0–20)
MICROALBUMIN/CREAT 24H UR: 32 MG/G CREATININE (ref 0–30)
MONOCYTES # BLD AUTO: 1.17 THOUSAND/ΜL (ref 0.17–1.22)
MONOCYTES NFR BLD AUTO: 7 % (ref 4–12)
NEUTROPHILS # BLD AUTO: 8.99 THOUSANDS/ΜL (ref 1.85–7.62)
NEUTS SEG NFR BLD AUTO: 56 % (ref 43–75)
NONHDLC SERPL-MCNC: 88 MG/DL
NRBC BLD AUTO-RTO: 0 /100 WBCS
PLATELET # BLD AUTO: 267 THOUSANDS/UL (ref 149–390)
PMV BLD AUTO: 11.5 FL (ref 8.9–12.7)
POTASSIUM SERPL-SCNC: 4.1 MMOL/L (ref 3.5–5.3)
RBC # BLD AUTO: 6.06 MILLION/UL (ref 3.88–5.62)
SODIUM SERPL-SCNC: 137 MMOL/L (ref 136–145)
TRIGL SERPL-MCNC: 113 MG/DL
WBC # BLD AUTO: 16.06 THOUSAND/UL (ref 4.31–10.16)

## 2022-03-03 PROCEDURE — 85025 COMPLETE CBC W/AUTO DIFF WBC: CPT

## 2022-03-03 PROCEDURE — 86140 C-REACTIVE PROTEIN: CPT

## 2022-03-03 PROCEDURE — 3061F NEG MICROALBUMINURIA REV: CPT | Performed by: FAMILY MEDICINE

## 2022-03-03 PROCEDURE — 36415 COLL VENOUS BLD VENIPUNCTURE: CPT

## 2022-03-03 PROCEDURE — 80061 LIPID PANEL: CPT

## 2022-03-03 PROCEDURE — 82043 UR ALBUMIN QUANTITATIVE: CPT | Performed by: INTERNAL MEDICINE

## 2022-03-03 PROCEDURE — 3044F HG A1C LEVEL LT 7.0%: CPT | Performed by: FAMILY MEDICINE

## 2022-03-03 PROCEDURE — 82570 ASSAY OF URINE CREATININE: CPT | Performed by: INTERNAL MEDICINE

## 2022-03-03 PROCEDURE — 85652 RBC SED RATE AUTOMATED: CPT

## 2022-03-03 PROCEDURE — 80048 BASIC METABOLIC PNL TOTAL CA: CPT

## 2022-03-03 PROCEDURE — 83036 HEMOGLOBIN GLYCOSYLATED A1C: CPT

## 2022-03-10 ENCOUNTER — TELEPHONE (OUTPATIENT)
Dept: HEMATOLOGY ONCOLOGY | Facility: CLINIC | Age: 52
End: 2022-03-10

## 2022-03-10 NOTE — TELEPHONE ENCOUNTER
New Patient Intake Form   Patient Details:    Martinez Day  1970  [id number]    Appointment Information   Who is calling to schedule? Emmy-Wife   If not self, what is the caller's name? Please put name of RBC nurse as well  Referring provider Wesley Giraldo   What is the diagnosis? Other elevated white blood cell (WBC) count     Is there a confirmed tissue diagnosis? No   Is there a biopsy ordered or pending? Please specify dates   No     Is patient aware of diagnosis? Yes   Have you had any imaging or labs done? If yes, where? (If imaging done outside of [facility], please remind patient to bring a disk ) Labs 3-3-2022     If imaging done at outside facility, did you instruct patient to obtain discs and bring to visit? Have you been seen by another Oncologist/Hematologist?  If so, who and where? No   Are the records in [facility] or Care Everywhere? Yes   Does the patient have records at another facility/hospital? No   If yes, Name of facility, city and state where facility is located  Did you instruct patient to have records faxed to rightx and provide rightfax number? [facility] [place]   Is the patient willing to be seen by another provider?   (This is for breast patients only)    Miscellaneous Information:

## 2022-03-17 ENCOUNTER — CONSULT (OUTPATIENT)
Dept: HEMATOLOGY ONCOLOGY | Facility: CLINIC | Age: 52
End: 2022-03-17
Payer: COMMERCIAL

## 2022-03-17 VITALS
HEIGHT: 69 IN | RESPIRATION RATE: 16 BRPM | HEART RATE: 88 BPM | WEIGHT: 204 LBS | DIASTOLIC BLOOD PRESSURE: 70 MMHG | TEMPERATURE: 97.8 F | SYSTOLIC BLOOD PRESSURE: 122 MMHG | OXYGEN SATURATION: 98 % | BODY MASS INDEX: 30.21 KG/M2

## 2022-03-17 DIAGNOSIS — D75.1 POLYCYTHEMIA: Primary | ICD-10-CM

## 2022-03-17 DIAGNOSIS — D72.828 OTHER ELEVATED WHITE BLOOD CELL (WBC) COUNT: ICD-10-CM

## 2022-03-17 PROBLEM — D72.829 LEUKOCYTOSIS: Status: ACTIVE | Noted: 2022-03-17

## 2022-03-17 PROCEDURE — 99204 OFFICE O/P NEW MOD 45 MIN: CPT | Performed by: INTERNAL MEDICINE
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
## 2022-03-21 ENCOUNTER — TELEPHONE (OUTPATIENT)
Dept: FAMILY MEDICINE CLINIC | Facility: CLINIC | Age: 52
End: 2022-03-21

## 2022-03-21 ENCOUNTER — OFFICE VISIT (OUTPATIENT)
Dept: FAMILY MEDICINE CLINIC | Facility: CLINIC | Age: 52
End: 2022-03-21
Payer: COMMERCIAL

## 2022-03-21 VITALS
SYSTOLIC BLOOD PRESSURE: 140 MMHG | OXYGEN SATURATION: 96 % | DIASTOLIC BLOOD PRESSURE: 76 MMHG | HEIGHT: 69 IN | WEIGHT: 205 LBS | HEART RATE: 88 BPM | RESPIRATION RATE: 16 BRPM | BODY MASS INDEX: 30.36 KG/M2

## 2022-03-21 DIAGNOSIS — E11.65 TYPE 2 DIABETES MELLITUS WITH HYPERGLYCEMIA, WITH LONG-TERM CURRENT USE OF INSULIN (HCC): ICD-10-CM

## 2022-03-21 DIAGNOSIS — D72.829 LEUKOCYTOSIS, UNSPECIFIED TYPE: Primary | ICD-10-CM

## 2022-03-21 DIAGNOSIS — Z12.11 SCREENING FOR COLORECTAL CANCER: ICD-10-CM

## 2022-03-21 DIAGNOSIS — Z79.4 TYPE 2 DIABETES MELLITUS WITH HYPERGLYCEMIA, WITH LONG-TERM CURRENT USE OF INSULIN (HCC): ICD-10-CM

## 2022-03-21 DIAGNOSIS — D75.1 POLYCYTHEMIA: ICD-10-CM

## 2022-03-21 DIAGNOSIS — I10 PRIMARY HYPERTENSION: ICD-10-CM

## 2022-03-21 DIAGNOSIS — I83.813 VARICOSE VEINS OF BOTH LOWER EXTREMITIES WITH PAIN: ICD-10-CM

## 2022-03-21 DIAGNOSIS — Z86.010 HISTORY OF COLON POLYPS: ICD-10-CM

## 2022-03-21 DIAGNOSIS — Z12.12 SCREENING FOR COLORECTAL CANCER: ICD-10-CM

## 2022-03-21 DIAGNOSIS — F17.200 SMOKER: ICD-10-CM

## 2022-03-21 PROBLEM — E16.2 HYPOGLYCEMIA: Status: RESOLVED | Noted: 2021-10-25 | Resolved: 2022-03-21

## 2022-03-21 PROCEDURE — 4004F PT TOBACCO SCREEN RCVD TLK: CPT | Performed by: FAMILY MEDICINE

## 2022-03-21 PROCEDURE — 99214 OFFICE O/P EST MOD 30 MIN: CPT | Performed by: FAMILY MEDICINE

## 2022-03-21 PROCEDURE — 3078F DIAST BP <80 MM HG: CPT | Performed by: FAMILY MEDICINE

## 2022-03-21 PROCEDURE — 3077F SYST BP >= 140 MM HG: CPT | Performed by: FAMILY MEDICINE

## 2022-03-21 RX ORDER — VARENICLINE TARTRATE 1 MG/1
1 TABLET, FILM COATED ORAL 2 TIMES DAILY
Qty: 180 TABLET | Refills: 1 | Status: SHIPPED | OUTPATIENT
Start: 2022-03-21

## 2022-03-21 RX ORDER — VARENICLINE TARTRATE 0.5 MG/1
0.5 TABLET, FILM COATED ORAL 2 TIMES DAILY
Qty: 28 TABLET | Refills: 0 | Status: SHIPPED | OUTPATIENT
Start: 2022-03-21 | End: 2022-03-23

## 2022-03-21 NOTE — TELEPHONE ENCOUNTER
FYI - patient's wife called to give the name of her 's eye doctor, as he did not have the information during his visit:    Dr Carleen Loss Dr , Kristeen Kehr  [address] [place] [address], [address] [place] [address]  [phone]

## 2022-03-21 NOTE — PROGRESS NOTES
Assessment/Plan:    Lets work on the smoking   Also get him to colonoscopy again       1  Leukocytosis, unspecified type    2  Polycythemia    3  Smoker  -     varenicline (CHANTIX) 1 mg tablet; Take 1 tablet (1 mg total) by mouth 2 (two) times a day    4  Type 2 diabetes mellitus with hyperglycemia, with long-term current use of insulin [facility])  Assessment & Plan:    Lab Results   Component Value Date    HGBA1C 6 9 (H) 03/03/2022   -stable/controlled, continue same medication  Will evaluate again next visit         5  Screening for colorectal cancer    6  History of colon polyps  -     Ambulatory referral for Colonoscopy; Future    7  Primary hypertension  Assessment & Plan:  -stable/controlled, continue same medication  Will evaluate again next visit         8  Varicose veins of both lower extremities with pain  Assessment & Plan:  comression stockings   Stop smoking          Subjective:      Patient ID: Jerod Johns is a 45 y o  male  HPI     Saw heme   For leukocytosis and plycythemia   He wonders if it was just do smoking   He is down to 2-3 a day   He is interested in chantix       The following portions of the patient's history were reviewed and updated as appropriate: allergies, current medications, past family history, past medical history, past social history, past surgical history and problem list     Review of Systems   Constitutional: Negative for activity change, appetite change and fever  HENT: Negative for congestion, nosebleeds and trouble swallowing  Eyes: Negative for itching  Respiratory: Negative for cough and chest tightness  Cardiovascular: Negative for chest pain and palpitations  Gastrointestinal: Negative for abdominal pain, constipation, diarrhea and nausea  Endocrine: Negative for cold intolerance  Genitourinary: Negative for frequency  Musculoskeletal: Negative for gait problem and joint swelling  Skin: Negative for rash     Allergic/Immunologic: Negative for immunocompromised state  Neurological: Negative for dizziness, tremors, seizures, syncope and headaches  Psychiatric/Behavioral: Negative for hallucinations and suicidal ideas  Objective:      /76 (BP Location: Right arm, Patient Position: Sitting, Cuff Size: Standard)   Pulse 88   Resp 16   Ht 5' 9" (1 753 m)   Wt 93 kg (205 lb)   SpO2 96%   BMI 30 27 kg/m²     No visits with results within 2 Week(s) from this visit  Latest known visit with results is:   Appointment on 03/03/2022   Component Date Value    Sodium 03/03/2022 137     Potassium 03/03/2022 4 1     Chloride 03/03/2022 105     CO2 03/03/2022 25     ANION GAP 03/03/2022 7     BUN 03/03/2022 14     Creatinine 03/03/2022 0 82     Glucose, Fasting 03/03/2022 138*    Calcium 03/03/2022 9 0     eGFR 03/03/2022 101     Cholesterol 03/03/2022 123     Triglycerides 03/03/2022 113     HDL, Direct 03/03/2022 35*    LDL Calculated 03/03/2022 65     Non-HDL-Chol (CHOL-HDL) 03/03/2022 88     WBC 03/03/2022 16 06*    RBC 03/03/2022 6 06*    Hemoglobin 03/03/2022 17 2*    Hematocrit 03/03/2022 50 9*    MCV 03/03/2022 84     MCH 03/03/2022 28 4     MCHC 03/03/2022 33 8     RDW 03/03/2022 14 2     MPV 03/03/2022 11 5     Platelets 33/23/9435 267     nRBC 03/03/2022 0     Neutrophils Relative 03/03/2022 56     Immat GRANS % 03/03/2022 1     Lymphocytes Relative 03/03/2022 31     Monocytes Relative 03/03/2022 7     Eosinophils Relative 03/03/2022 4     Basophils Relative 03/03/2022 1     Neutrophils Absolute 03/03/2022 8 99*    Immature Grans Absolute 03/03/2022 0 10     Lymphocytes Absolute 03/03/2022 4 94*    Monocytes Absolute 03/03/2022 1 17     Eosinophils Absolute 03/03/2022 0 70*    Basophils Absolute 03/03/2022 0 16*    CRP 03/03/2022 <3 0     Sed Rate 03/03/2022 8     Hemoglobin A1C 03/03/2022 6 9*    EAG 03/03/2022 151           Physical Exam  Vitals and nursing note reviewed     Constitutional: General: He is not in acute distress  Appearance: He is well-developed  He is obese  HENT:      Head: Normocephalic and atraumatic  Cardiovascular:      Rate and Rhythm: Normal rate and regular rhythm  Pulses: no weak pulses          Dorsalis pedis pulses are 2+ on the right side and 2+ on the left side  Heart sounds: Normal heart sounds  No murmur heard  Pulmonary:      Effort: Pulmonary effort is normal       Breath sounds: Normal breath sounds  No wheezing or rales  Abdominal:      General: Bowel sounds are normal  There is no distension  Palpations: Abdomen is soft  Tenderness: There is no abdominal tenderness  There is no guarding or rebound  Musculoskeletal:         General: No tenderness  Normal range of motion  Cervical back: Normal range of motion and neck supple  Feet:      Right foot:      Skin integrity: Callus and dry skin present  No ulcer, skin breakdown, erythema or warmth  Left foot:      Skin integrity: Callus and dry skin present  No ulcer, skin breakdown, erythema or warmth  Lymphadenopathy:      Cervical: No cervical adenopathy  Skin:     General: Skin is warm and dry  Capillary Refill: Capillary refill takes less than 2 seconds  Findings: No rash  Neurological:      Mental Status: He is alert and oriented to person, place, and time  Cranial Nerves: No cranial nerve deficit  Sensory: No sensory deficit  Motor: No abnormal muscle tone  Psychiatric:         Behavior: Behavior normal          Thought Content: Thought content normal          Judgment: Judgment normal            Patient's shoes and socks removed  Right Foot/Ankle   Right Foot Inspection  Skin Exam: skin normal, skin intact, dry skin, callus and callus  No warmth, no erythema, no maceration, no abnormal color, no pre-ulcer and no ulcer  Toe Exam: ROM and strength within normal limits       Sensory   Monofilament testing: intact    Vascular  Capillary refills: < 3 seconds  The right DP pulse is 2+  Left Foot/Ankle  Left Foot Inspection  Skin Exam: skin normal, skin intact, dry skin and callus  No warmth, no erythema, no maceration, normal color, no pre-ulcer and no ulcer  Toe Exam: ROM and strength within normal limits  Sensory   Monofilament testing: intact    Vascular  Capillary refills: < 3 seconds  The left DP pulse is 2+  Assign Risk Category  No deformity present  No loss of protective sensation  No weak pulses  Risk: 0    BMI Counseling: Body mass index is 30 27 kg/m²  The BMI is above normal  Nutrition recommendations include decreasing portion sizes, encouraging healthy choices of fruits and vegetables and limiting drinks that contain sugar  Exercise recommendations include moderate physical activity 150 minutes/week  No pharmacotherapy was ordered  Rationale for BMI follow-up plan is due to patient being overweight or obese  BMI Counseling: Body mass index is 30 27 kg/m²  Follow-up plan was not completed due to elderly patient (71 years old) where weight reduction/weight gain would complicate underlying health condition such as: nutritional deficiency           Deb Holcomb MD  [place] [id number]

## 2022-03-22 NOTE — ASSESSMENT & PLAN NOTE
Lab Results   Component Value Date    HGBA1C 6 9 (H) 03/03/2022   -stable/controlled, continue same medication   Will evaluate again next visit

## 2022-03-25 ENCOUNTER — TELEPHONE (OUTPATIENT)
Dept: GASTROENTEROLOGY | Facility: CLINIC | Age: 52
End: 2022-03-25

## 2022-03-25 NOTE — TELEPHONE ENCOUNTER
Scheduled date of colonoscopy (as of today):5/9/22  Physician performing colonoscopy:Dr Abad   Location of colonoscopy:[facility]  Bowel prep reviewed with patient:Miralax w/ mag & dul reviewed with wife  Instructions reviewed with patient by:ls  Clearances: n/a

## 2022-03-25 NOTE — TELEPHONE ENCOUNTER
[facility] Assessment    Name: Antonietta Recio  YOB: 1970  Last Height: 5' 9" (1 753 m)  Last weight: 93 kg (205 lb)  BMI: 30 27 kg/m²  Procedure: Colon  Diagnosis: hx of polyps/hx of tubular adenoma polyps  Date of procedure: 5/9/22  Prep: Miralax w/ mag & dul   Responsible : yes, wife  Phone#: [phone]  Name completing form: Basil Gomez  Date form completed: 03/25/22    If the patient answers yes to any of these questions, schedule in a hospital  Are you pregnant: No  Do you rely on a wheelchair for mobility: No  Have you been diagnosed with End Stage Renal Disease (ESRD): No  Do you need oxygen during the day: No  Have you had a heart attack or stroke within the past three months: No  Have you had a seizure within the past three months: No  Have you ever been informed by anesthesia that you have a difficult airway: No  Additional Questions  Have you had any cardiac testing or are under the care of a Cardiologist (see cardiac list): No  Cardiac list:   Do you have an implanted cardiac defibrillator: No (Comment:  This patient should be scheduled in the hospital)    Have any bleeding problems, such as anemia or hemophilia (If patient has H&H result below 8, schedule in hospital   H&H must be within 30 days of procedure): No    Had an organ transplant within the past 3 months: No    Do you have any present infections: No  Do you get short of breath when walking a few blocks: No  Have you been diagnosed with diabetes: Yes  Comments (provide cardiac provider information if applicable):

## 2022-03-31 ENCOUNTER — OFFICE VISIT (OUTPATIENT)
Dept: ENDOCRINOLOGY | Facility: CLINIC | Age: 52
End: 2022-03-31
Payer: COMMERCIAL

## 2022-03-31 VITALS
HEART RATE: 96 BPM | DIASTOLIC BLOOD PRESSURE: 60 MMHG | BODY MASS INDEX: 30.66 KG/M2 | HEIGHT: 69 IN | SYSTOLIC BLOOD PRESSURE: 120 MMHG | WEIGHT: 207 LBS

## 2022-03-31 DIAGNOSIS — I10 PRIMARY HYPERTENSION: ICD-10-CM

## 2022-03-31 DIAGNOSIS — E78.00 PURE HYPERCHOLESTEROLEMIA: ICD-10-CM

## 2022-03-31 DIAGNOSIS — E66.9 CLASS 1 OBESITY WITH BODY MASS INDEX (BMI) OF 30.0 TO 30.9 IN ADULT, UNSPECIFIED OBESITY TYPE, UNSPECIFIED WHETHER SERIOUS COMORBIDITY PRESENT: ICD-10-CM

## 2022-03-31 DIAGNOSIS — R80.9 MICROALBUMINURIA: ICD-10-CM

## 2022-03-31 DIAGNOSIS — Z79.4 TYPE 2 DIABETES MELLITUS WITH OTHER SPECIFIED COMPLICATION, WITH LONG-TERM CURRENT USE OF INSULIN (HCC): Primary | ICD-10-CM

## 2022-03-31 DIAGNOSIS — E11.69 TYPE 2 DIABETES MELLITUS WITH OTHER SPECIFIED COMPLICATION, WITH LONG-TERM CURRENT USE OF INSULIN (HCC): Primary | ICD-10-CM

## 2022-03-31 PROBLEM — E66.811 CLASS 1 OBESITY WITH BODY MASS INDEX (BMI) OF 30.0 TO 30.9 IN ADULT: Status: ACTIVE | Noted: 2022-03-31

## 2022-03-31 PROCEDURE — 3066F NEPHROPATHY DOC TX: CPT | Performed by: FAMILY MEDICINE

## 2022-03-31 PROCEDURE — 3008F BODY MASS INDEX DOCD: CPT | Performed by: FAMILY MEDICINE

## 2022-03-31 PROCEDURE — 95251 CONT GLUC MNTR ANALYSIS I&R: CPT | Performed by: INTERNAL MEDICINE

## 2022-03-31 PROCEDURE — 99214 OFFICE O/P EST MOD 30 MIN: CPT | Performed by: INTERNAL MEDICINE

## 2022-03-31 RX ORDER — EMPAGLIFLOZIN, METFORMIN HYDROCHLORIDE 12.5; 1 MG/1; MG/1
TABLET, EXTENDED RELEASE ORAL
Qty: 180 TABLET | Refills: 3 | Status: SHIPPED | OUTPATIENT
Start: 2022-03-31

## 2022-03-31 RX ORDER — SEMAGLUTIDE 1.34 MG/ML
1 INJECTION, SOLUTION SUBCUTANEOUS WEEKLY
Qty: 9 ML | Refills: 3 | Status: SHIPPED | OUTPATIENT
Start: 2022-03-31

## 2022-03-31 NOTE — PATIENT INSTRUCTIONS
Continue current regimen   Please check blood sugars    Prior to bed, at least once in 8 hours so we have a continuous trend of your blood sugars     Please try to download pump at home and bring for review   Follow-up in 3 months with repeat labs

## 2022-03-31 NOTE — PROGRESS NOTES
Tim Castellon 45 y o  male MRN: [id number]    Encounter: [id number]      Assessment/Plan     1  Type 2 diabetes mellitus on long-term insulin therapy   2  Insulin Pump  3  Obesity   Well controlled with Last A1c 6 9%    Recommend the following at this time  Continue current regimen  Try to include some physical activity into daily routine   Check blood sugars at least once 8 hours  -repeat labs in 3 months prior to follow-up   Patient will try to download insulin pump, send data for review     4  Hyperlipidemia LDL at goal  - continue statin therapy    5  Hypertension  Blood pressure at goal  - continue current medications  6  Microalbuminuria-mildly elevated microalbumin levels-repeat in 3 months       CC: Diabetes    History of Present Illness     HPI:  Tim Castellon is a 45 y o  male presents for a follow-up visit regarding diabetes management     Also has hypertension, hyperlipidemia,    Last seen in 10/2021  Last Eye exam: 2021- No DR    Current regimen:   Ozempic 1 mg weekly   Synjardy 12 5-1000 mg orally twice a day  omnipod insulin pump    Insulin used:  novolog  Active insulin time:3 5 hr  Basal                                                               Time Rate   12 am   1 0     Time Insulin:Carb Ratio Insulin Sensitivity Factor   12A  8 0   40     Target B  Average daily insulin:  46 units - average calculated for last 10 days   unable to download pump      Eating better, checking more BG , feels overall his blood sugars have improved  Has not been exercising recently   Using chantix to help quit smoking, has decreased  Following up with Hematology Oncology for leukocytosis, abnormal CBC  No complaints, feels well    Statin:  Lipitor  ACE-I/ARB: ---    Home glucose monitoring:  CGM interpretation   freestye Yoly  Time percentage CGM worn  78 %   Time in range 89   (goal >65-70%)  High -11%  Very high -0  Low-0  Very low 0    SD 22 7 (goal <50)     Average glucose 141 mg/dL  GMI 6 7%     All other systems were reviewed and are negative  Review of Systems      Historical Information   Past Medical History:   Diagnosis Date    Diabetes mellitus ([address] )     Hives     Hyperlipidemia     Hypertension      Past Surgical History:   Procedure Laterality Date    COLONOSCOPY  2017    VEIN LIGATION AND STRIPPING Bilateral      Social History   Social History     Substance and Sexual Activity   Alcohol Use No     Social History     Substance and Sexual Activity   Drug Use No     Social History     Tobacco Use   Smoking Status Light Tobacco Smoker    Packs/day: 0 50   Smokeless Tobacco Never Used   Tobacco Comment    trying to quit 9/17/20 using Chantix     Family History:   Family History   Problem Relation Age of Onset    Diabetes Father     Heart disease Father     Diabetes Brother        Meds/Allergies   Current Outpatient Medications   Medication Sig Dispense Refill    amLODIPine (NORVASC) 5 mg tablet Take 1 tablet (5 mg total) by mouth daily 90 tablet 1    atorvastatin (LIPITOR) 20 mg tablet TAKE 1 TABLET BY MOUTH EVERY DAY AT NIGHT      b complex vitamins capsule Take 1 capsule by mouth daily      Coenzyme Q10 10 MG capsule Take 10 mg by mouth daily      Continuous Blood Gluc Sensor (FreeStyle Yoly 14 Day Sensor) MISC 1 KIT BY MISCELLANEOUS ROUTE EVERY 14 (FOURTEEN) DAYS   Continuous Blood Gluc Sensor (FreeStyle Yoly 14 Day Sensor) MISC USE 1 SENSOR EVERY 14 DAYS TO MONITOR BLOOD SUGAR 6 each 4    Empagliflozin-metFORMIN HCl ER (Synjardy XR) 12 5-1000 MG TB24 Take one tablet (125-1000 mg) two times a day with meals 180 tablet 3    EPINEPHrine (EpiPen 2-Neeraj) 0 3 mg/0 3 mL SOAJ EpiPen 2-Neeraj 0 3 mg/0 3 mL injection, auto-injector      multivitamin (THERAGRAN) TABS Take 1 tablet by mouth daily        NovoLOG 100 UNIT/ML injection USE 60U VIA PUMP DAILY 10 mL 3    Omalizumab (XOLAIR SC) Inject under the skin every 30 (thirty) days       Ozempic, 1 MG/DOSE, 2 MG/1 5ML SOPN INJECT 1 MG UNDER THE SKIN ONCE A WEEK  9 mL 0    varenicline (CHANTIX) 1 mg tablet Take 1 tablet (1 mg total) by mouth 2 (two) times a day 180 tablet 1    varenicline (CHANTIX) 1 mg tablet Take 0 5 tablets (0 5 mg total) by mouth 2 (two) times a day for 14 days 14 tablet 0     No current facility-administered medications for this visit  Allergies   Allergen Reactions    Aspirin      Possible hives reaction    Crestor [Rosuvastatin] Hives    Invokana [Canagliflozin] Hives    Janumet [Sitagliptin-Metformin Hcl] Hives    Sitagliptin Hives       Objective   Vitals: Blood pressure 120/60, pulse 96, height 5' 9" (1 753 m), weight 93 9 kg (207 lb)  Physical Exam  Constitutional:       General: He is not in acute distress  Appearance: He is well-developed  He is not diaphoretic  HENT:      Head: Normocephalic and atraumatic  Eyes:      Conjunctiva/sclera: Conjunctivae normal       Pupils: Pupils are equal, round, and reactive to light  Cardiovascular:      Rate and Rhythm: Normal rate and regular rhythm  Heart sounds: Normal heart sounds  No murmur heard  Pulmonary:      Effort: Pulmonary effort is normal  No respiratory distress  Breath sounds: Normal breath sounds  No wheezing  Abdominal:      General: There is no distension  Palpations: Abdomen is soft  Tenderness: There is no abdominal tenderness  There is no guarding  Musculoskeletal:      Cervical back: Normal range of motion and neck supple  Skin:     General: Skin is warm and dry  Findings: No erythema or rash  Neurological:      Mental Status: He is alert and oriented to person, place, and time  Psychiatric:         Behavior: Behavior normal          Thought Content: Thought content normal          The history was obtained from the review of the chart, patient      Lab Results:   Lab Results   Component Value Date/Time    Hemoglobin A1C 6 9 (H) 03/03/2022 07:02 AM    Hemoglobin A1C 6 8 (H) 10/22/2021 07:02 AM Hemoglobin A1C 6 0 (H) 07/08/2021 07:02 AM    WBC 16 06 (H) 03/03/2022 07:02 AM    WBC 16 04 (H) 02/18/2022 07:04 AM    WBC 17 74 (H) 02/14/2022 12:17 PM    Hemoglobin 17 2 (H) 03/03/2022 07:02 AM    Hemoglobin 17 0 02/18/2022 07:04 AM    Hemoglobin 17 0 02/14/2022 12:17 PM    Hematocrit 50 9 (H) 03/03/2022 07:02 AM    Hematocrit 50 3 (H) 02/18/2022 07:04 AM    Hematocrit 49 8 (H) 02/14/2022 12:17 PM    MCV 84 03/03/2022 07:02 AM    MCV 84 02/18/2022 07:04 AM    MCV 84 02/14/2022 12:17 PM    Platelets 508 94/37/6335 07:02 AM    Platelets 780 92/26/9929 07:04 AM    Platelets 622 17/84/1259 12:17 PM    BUN 14 03/03/2022 07:02 AM    BUN 13 02/14/2022 12:17 PM    BUN 15 10/22/2021 07:02 AM    Potassium 4 1 03/03/2022 07:02 AM    Potassium 4 2 02/14/2022 12:17 PM    Potassium 4 0 10/22/2021 07:02 AM    Chloride 105 03/03/2022 07:02 AM    Chloride 100 02/14/2022 12:17 PM    Chloride 106 10/22/2021 07:02 AM    CO2 25 03/03/2022 07:02 AM    CO2 21 02/14/2022 12:17 PM    CO2 28 10/22/2021 07:02 AM    Creatinine 0 82 03/03/2022 07:02 AM    Creatinine 0 90 02/14/2022 12:17 PM    Creatinine 0 81 10/22/2021 07:02 AM    AST 19 02/14/2022 12:17 PM    AST 26 10/22/2021 07:02 AM    AST 23 07/08/2021 07:02 AM    ALT 35 02/14/2022 12:17 PM    ALT 46 10/22/2021 07:02 AM    ALT 38 07/08/2021 07:02 AM    Albumin 4 1 02/14/2022 12:17 PM    Albumin 3 8 10/22/2021 07:02 AM    Albumin 3 6 07/08/2021 07:02 AM    HDL, Direct 35 (L) 03/03/2022 07:02 AM    HDL, Direct 34 (L) 10/22/2021 07:02 AM    HDL, Direct 31 (L) 07/08/2021 07:02 AM    Triglycerides 113 03/03/2022 07:02 AM    Triglycerides 117 10/22/2021 07:02 AM    Triglycerides 97 07/08/2021 07:02 AM         Imaging Studies: I have personally reviewed pertinent reports  Portions of the record may have been created with voice recognition software  Occasional wrong word or "sound a like" substitutions may have occurred due to the inherent limitations of voice recognition software   Read the chart carefully and recognize, using context, where substitutions have occurred

## 2022-04-01 ENCOUNTER — TELEPHONE (OUTPATIENT)
Dept: ENDOCRINOLOGY | Facility: CLINIC | Age: 52
End: 2022-04-01

## 2022-04-01 NOTE — TELEPHONE ENCOUNTER
----- Message from Avis Sanchez sent at 3/31/2022  7:33 PM EDT -----  Regarding: Omnipod Report  Attached you will find a report from [facility] showing the last 90 days  If you need additional information or something different, please let me know  Also, if you have a code that I can directly send the [employer] information to you, please let me know that as well  Thank you

## 2022-04-04 ENCOUNTER — TELEPHONE (OUTPATIENT)
Dept: ENDOCRINOLOGY | Facility: CLINIC | Age: 52
End: 2022-04-04

## 2022-04-11 DIAGNOSIS — Z79.4 TYPE 2 DIABETES MELLITUS WITH OTHER SPECIFIED COMPLICATION, WITH LONG-TERM CURRENT USE OF INSULIN (HCC): ICD-10-CM

## 2022-04-11 DIAGNOSIS — E11.69 TYPE 2 DIABETES MELLITUS WITH OTHER SPECIFIED COMPLICATION, WITH LONG-TERM CURRENT USE OF INSULIN (HCC): ICD-10-CM

## 2022-05-05 ENCOUNTER — VBI (OUTPATIENT)
Dept: ADMINISTRATIVE | Facility: OTHER | Age: 52
End: 2022-05-05

## 2022-05-06 ENCOUNTER — TELEPHONE (OUTPATIENT)
Dept: GASTROENTEROLOGY | Facility: CLINIC | Age: 52
End: 2022-05-06

## 2022-05-06 NOTE — TELEPHONE ENCOUNTER
Patients GI provider:  Dr Tylor Snider    Number to return call: [phone]    Reason for call: Pt's wife called in to reschedule pt's procedure  Above is her number       Scheduled procedure/appointment date if applicable: procedure 3/81/11

## 2022-05-06 NOTE — TELEPHONE ENCOUNTER
I lmom confirming pt's colon scheduled on 5/13/22 at [facility] with Dr Mgaui Mullins   I informed that the endoscopy center would be calling him 1-2 days prior with his arrival time  I also informed that he will need to do a clear liquid diet day prior and do bowel cleansing prep and that he would need a  the day of procedure due to being under sedation  I asked pt to please call back if he has not received his instructions on how to prepare of if he has any questions

## 2022-06-13 ENCOUNTER — TELEPHONE (OUTPATIENT)
Dept: GASTROENTEROLOGY | Facility: CLINIC | Age: 52
End: 2022-06-13

## 2022-06-13 NOTE — TELEPHONE ENCOUNTER
Spoke to wife confirming pt's colonoscopy scheduled on 6/17/22 with Dr Claus Mcclain at [facility]  I informed that pt will be called 1-2 days prior by [facility] with arrival time  I informed pt would need to do a clear liquid diet the day prior as well as the bowel cleansing preparation  I informed that pt would need a  the day of the procedure due to being under sedation  Wife informed that pt did not receive his instructions  I reviewed and emailed to him

## 2022-06-16 RX ORDER — SODIUM CHLORIDE, SODIUM LACTATE, POTASSIUM CHLORIDE, CALCIUM CHLORIDE 600; 310; 30; 20 MG/100ML; MG/100ML; MG/100ML; MG/100ML
125 INJECTION, SOLUTION INTRAVENOUS CONTINUOUS
Status: CANCELLED | OUTPATIENT
Start: 2022-06-16

## 2022-06-17 ENCOUNTER — ANESTHESIA EVENT (OUTPATIENT)
Dept: GASTROENTEROLOGY | Facility: AMBULATORY SURGERY CENTER | Age: 52
End: 2022-06-17

## 2022-06-17 ENCOUNTER — ANESTHESIA (OUTPATIENT)
Dept: GASTROENTEROLOGY | Facility: AMBULATORY SURGERY CENTER | Age: 52
End: 2022-06-17

## 2022-06-17 ENCOUNTER — HOSPITAL ENCOUNTER (OUTPATIENT)
Dept: GASTROENTEROLOGY | Facility: AMBULATORY SURGERY CENTER | Age: 52
Discharge: HOME | End: 2022-06-17
Payer: COMMERCIAL

## 2022-06-17 VITALS
DIASTOLIC BLOOD PRESSURE: 75 MMHG | RESPIRATION RATE: 18 BRPM | HEIGHT: 69 IN | SYSTOLIC BLOOD PRESSURE: 112 MMHG | BODY MASS INDEX: 31.84 KG/M2 | TEMPERATURE: 96.3 F | OXYGEN SATURATION: 98 % | HEART RATE: 68 BPM | WEIGHT: 215 LBS

## 2022-06-17 DIAGNOSIS — Z86.010 HX OF COLONIC POLYPS: ICD-10-CM

## 2022-06-17 DIAGNOSIS — Z86.010 HX OF ADENOMATOUS COLONIC POLYPS: ICD-10-CM

## 2022-06-17 PROCEDURE — 45380 COLONOSCOPY AND BIOPSY: CPT | Performed by: INTERNAL MEDICINE

## 2022-06-17 PROCEDURE — 88305 TISSUE EXAM BY PATHOLOGIST: CPT | Performed by: PATHOLOGY

## 2022-06-17 PROCEDURE — 45385 COLONOSCOPY W/LESION REMOVAL: CPT | Performed by: INTERNAL MEDICINE

## 2022-06-17 RX ORDER — PROPOFOL 10 MG/ML
INJECTION, EMULSION INTRAVENOUS AS NEEDED
Status: DISCONTINUED | OUTPATIENT
Start: 2022-06-17 | End: 2022-06-17

## 2022-06-17 RX ORDER — SODIUM CHLORIDE, SODIUM LACTATE, POTASSIUM CHLORIDE, CALCIUM CHLORIDE 600; 310; 30; 20 MG/100ML; MG/100ML; MG/100ML; MG/100ML
125 INJECTION, SOLUTION INTRAVENOUS CONTINUOUS
Status: DISCONTINUED | OUTPATIENT
Start: 2022-06-17 | End: 2022-06-21 | Stop reason: HOSPADM

## 2022-06-17 RX ORDER — SODIUM CHLORIDE 9 MG/ML
INJECTION, SOLUTION INTRAVENOUS CONTINUOUS PRN
Status: DISCONTINUED | OUTPATIENT
Start: 2022-06-17 | End: 2022-06-17

## 2022-06-17 RX ADMIN — PROPOFOL 25 MG: 10 INJECTION, EMULSION INTRAVENOUS at 07:34

## 2022-06-17 RX ADMIN — PROPOFOL 25 MG: 10 INJECTION, EMULSION INTRAVENOUS at 07:45

## 2022-06-17 RX ADMIN — PROPOFOL 25 MG: 10 INJECTION, EMULSION INTRAVENOUS at 07:42

## 2022-06-17 RX ADMIN — PROPOFOL 25 MG: 10 INJECTION, EMULSION INTRAVENOUS at 07:39

## 2022-06-17 RX ADMIN — PROPOFOL 50 MG: 10 INJECTION, EMULSION INTRAVENOUS at 07:31

## 2022-06-17 RX ADMIN — PROPOFOL 50 MG: 10 INJECTION, EMULSION INTRAVENOUS at 07:33

## 2022-06-17 RX ADMIN — PROPOFOL 25 MG: 10 INJECTION, EMULSION INTRAVENOUS at 07:36

## 2022-06-17 RX ADMIN — PROPOFOL 50 MG: 10 INJECTION, EMULSION INTRAVENOUS at 07:32

## 2022-06-17 RX ADMIN — SODIUM CHLORIDE: 9 INJECTION, SOLUTION INTRAVENOUS at 07:25

## 2022-06-17 NOTE — ANESTHESIA POSTPROCEDURE EVALUATION
Post-Op Assessment Note    CV Status:  Stable    Pain management: adequate     Mental Status:  Alert and awake   Hydration Status:  Euvolemic   PONV Controlled:  Controlled   Airway Patency:  Patent      Post Op Vitals Reviewed: Yes      Staff: CRNA, Anesthesiologist         No complications documented      /64 (06/17/22 0755)    Temp     Pulse 82 (06/17/22 0755)   Resp 18 (06/17/22 0755)    SpO2 95 % (06/17/22 0755)

## 2022-06-17 NOTE — ANESTHESIA PREPROCEDURE EVALUATION
Procedure:  COLONOSCOPY    Relevant Problems   CARDIO   (+) Hypertension goal BP (blood pressure) < 140/90   (+) Pure hypercholesterolemia      ENDO   (+) Type 2 diabetes mellitus with hyperglycemia, with long-term current use of insulin (HCC)      MUSCULOSKELETAL   (+) Mid back pain      PULMONARY   (+) Smoker        Physical Exam    Airway    Mallampati score: II  TM Distance: >3 FB  Neck ROM: full     Dental   No notable dental hx     Cardiovascular      Pulmonary      Other Findings        Anesthesia Plan  ASA Score- 2     Anesthesia Type- IV sedation with anesthesia with ASA Monitors  Additional Monitors:   Airway Plan:           Plan Factors-Exercise tolerance (METS): >4 METS  Chart reviewed  Patient summary reviewed  Patient is not a current smoker  Induction- intravenous  Postoperative Plan- Plan for postoperative opioid use  Informed Consent- Anesthetic plan and risks discussed with patient  I personally reviewed this patient with the CRNA  Discussed and agreed on the Anesthesia Plan with the CRNA  Nicolás Gomez

## 2022-06-17 NOTE — H&P
History and Physical - [facility]  Kiesha Pittman 45 y o  male MRN: [id number]                  HPI: Kiesha Pittman is a 45y o  year old male who presents for polyp      REVIEW OF SYSTEMS: Per the HPI, and otherwise unremarkable      Historical Information   Past Medical History:   Diagnosis Date    Colon polyp     Diabetes mellitus ([address] )     FBS 0700 6/17/22 was 210 via Freestyle Yoly    Hives     Hives     had hives of left shoulder after last colonoscopy 3-4 years ago (2019)-unknown case-PCP rx'd steroid, resolved in 2 days    Hyperlipidemia     Hypertension      Past Surgical History:   Procedure Laterality Date    COLONOSCOPY  2017    VEIN LIGATION AND STRIPPING Bilateral      Social History   Social History     Substance and Sexual Activity   Alcohol Use No     Social History     Substance and Sexual Activity   Drug Use No     Social History     Tobacco Use   Smoking Status Light Tobacco Smoker    Packs/day: 0 50    Years: 20 00    Pack years: 10 00    Types: Cigarettes   Smokeless Tobacco Never Used   Tobacco Comment    trying to quit 9/17/20 using Chantix which was unsuccessful     Family History   Problem Relation Age of Onset    Diabetes Father     Heart disease Father     Diabetes Brother        Meds/Allergies       Current Outpatient Medications:     amLODIPine (NORVASC) 5 mg tablet    atorvastatin (LIPITOR) 20 mg tablet    b complex vitamins capsule    Coenzyme Q10 10 MG capsule    Continuous Blood Gluc Sensor (FreeStyle Yoly 14 Day Sensor) MISC    Continuous Blood Gluc Sensor (FreeStyle Yoly 14 Day Sensor) MISC    Empagliflozin-metFORMIN HCl ER (Synjardy XR) 12 5-1000 MG TB24    NovoLOG 100 UNIT/ML injection    Omalizumab (XOLAIR SC)    Ozempic, 1 MG/DOSE, 2 MG/1 5ML SOPN    varenicline (CHANTIX) 1 mg tablet    varenicline (CHANTIX) 1 mg tablet    EPINEPHrine (EPIPEN) 0 3 mg/0 3 mL SOAJ    multivitamin (THERAGRAN) TABS    Current Facility-Administered Medications:     lactated ringers infusion, 125 mL/hr, Intravenous, Continuous    Allergies   Allergen Reactions    Aspirin      Possible hives reaction    Crestor [Rosuvastatin] Hives    Invokana [Canagliflozin] Hives    Janumet [Sitagliptin-Metformin Hcl] Hives    Sitagliptin Hives       Objective     /73   Pulse 87   Temp (!) 96 3 °F (35 7 °C) (Skin)   Resp 18   Ht 5' 9" (1 753 m)   Wt 97 5 kg (215 lb)   SpO2 97%   BMI 31 75 kg/m²       PHYSICAL EXAM    Gen: NAD  Head: NCAT  CV: RRR  CHEST: Clear  ABD: soft, NT/ND  EXT: no edema      ASSESSMENT/PLAN:  This is a 45y o  year old male here for colon, and he is stable and optimized for his procedure

## 2022-09-13 ENCOUNTER — APPOINTMENT (OUTPATIENT)
Dept: LAB | Facility: CLINIC | Age: 52
End: 2022-09-13
Payer: COMMERCIAL

## 2022-09-13 ENCOUNTER — RA CDI HCC (OUTPATIENT)
Dept: OTHER | Facility: HOSPITAL | Age: 52
End: 2022-09-13

## 2022-09-13 DIAGNOSIS — R80.9 MICROALBUMINURIA: ICD-10-CM

## 2022-09-13 DIAGNOSIS — E11.69 TYPE 2 DIABETES MELLITUS WITH OTHER SPECIFIED COMPLICATION, WITH LONG-TERM CURRENT USE OF INSULIN (HCC): ICD-10-CM

## 2022-09-13 DIAGNOSIS — Z79.4 TYPE 2 DIABETES MELLITUS WITH OTHER SPECIFIED COMPLICATION, WITH LONG-TERM CURRENT USE OF INSULIN (HCC): ICD-10-CM

## 2022-09-13 DIAGNOSIS — E78.00 PURE HYPERCHOLESTEROLEMIA: ICD-10-CM

## 2022-09-13 DIAGNOSIS — I10 PRIMARY HYPERTENSION: ICD-10-CM

## 2022-09-13 LAB
ALBUMIN SERPL BCP-MCNC: 4.1 G/DL (ref 3.5–5)
ALP SERPL-CCNC: 78 U/L (ref 46–116)
ALT SERPL W P-5'-P-CCNC: 53 U/L (ref 12–78)
ANION GAP SERPL CALCULATED.3IONS-SCNC: 6 MMOL/L (ref 4–13)
AST SERPL W P-5'-P-CCNC: 29 U/L (ref 5–45)
BILIRUB SERPL-MCNC: 0.41 MG/DL (ref 0.2–1)
BUN SERPL-MCNC: 13 MG/DL (ref 5–25)
CALCIUM SERPL-MCNC: 9.3 MG/DL (ref 8.3–10.1)
CHLORIDE SERPL-SCNC: 105 MMOL/L (ref 96–108)
CO2 SERPL-SCNC: 24 MMOL/L (ref 21–32)
CREAT SERPL-MCNC: 0.97 MG/DL (ref 0.6–1.3)
CREAT UR-MCNC: 43.9 MG/DL
EST. AVERAGE GLUCOSE BLD GHB EST-MCNC: 146 MG/DL
GFR SERPL CREATININE-BSD FRML MDRD: 89 ML/MIN/1.73SQ M
GLUCOSE P FAST SERPL-MCNC: 119 MG/DL (ref 65–99)
HBA1C MFR BLD: 6.7 %
MICROALBUMIN UR-MCNC: 15.3 MG/L (ref 0–20)
MICROALBUMIN/CREAT 24H UR: 35 MG/G CREATININE (ref 0–30)
POTASSIUM SERPL-SCNC: 4.3 MMOL/L (ref 3.5–5.3)
PROT SERPL-MCNC: 7.6 G/DL (ref 6.4–8.4)
SODIUM SERPL-SCNC: 135 MMOL/L (ref 135–147)

## 2022-09-13 PROCEDURE — 80053 COMPREHEN METABOLIC PANEL: CPT

## 2022-09-13 PROCEDURE — 82570 ASSAY OF URINE CREATININE: CPT

## 2022-09-13 PROCEDURE — 83036 HEMOGLOBIN GLYCOSYLATED A1C: CPT

## 2022-09-13 PROCEDURE — 3044F HG A1C LEVEL LT 7.0%: CPT | Performed by: FAMILY MEDICINE

## 2022-09-13 PROCEDURE — 3066F NEPHROPATHY DOC TX: CPT | Performed by: FAMILY MEDICINE

## 2022-09-13 PROCEDURE — 82043 UR ALBUMIN QUANTITATIVE: CPT

## 2022-09-13 PROCEDURE — 3061F NEG MICROALBUMINURIA REV: CPT | Performed by: FAMILY MEDICINE

## 2022-09-13 PROCEDURE — 36415 COLL VENOUS BLD VENIPUNCTURE: CPT

## 2022-09-13 NOTE — PROGRESS NOTES
[facility]  coding opportunities          Chart Reviewed number of suggestions sent to Provider: 1   [phone]  Patients Insurance        Commercial Insurance: [address] [place] [address]

## 2022-09-15 ENCOUNTER — OFFICE VISIT (OUTPATIENT)
Dept: ENDOCRINOLOGY | Facility: CLINIC | Age: 52
End: 2022-09-15
Payer: COMMERCIAL

## 2022-09-15 VITALS
HEART RATE: 89 BPM | RESPIRATION RATE: 98 BRPM | SYSTOLIC BLOOD PRESSURE: 130 MMHG | WEIGHT: 211.4 LBS | HEIGHT: 69 IN | DIASTOLIC BLOOD PRESSURE: 70 MMHG | BODY MASS INDEX: 31.31 KG/M2

## 2022-09-15 DIAGNOSIS — Z79.4 TYPE 2 DIABETES MELLITUS WITH OTHER SPECIFIED COMPLICATION, WITH LONG-TERM CURRENT USE OF INSULIN (HCC): Primary | ICD-10-CM

## 2022-09-15 DIAGNOSIS — R80.9 MICROALBUMINURIA: ICD-10-CM

## 2022-09-15 DIAGNOSIS — E78.00 PURE HYPERCHOLESTEROLEMIA: ICD-10-CM

## 2022-09-15 DIAGNOSIS — I10 PRIMARY HYPERTENSION: ICD-10-CM

## 2022-09-15 DIAGNOSIS — E11.69 TYPE 2 DIABETES MELLITUS WITH OTHER SPECIFIED COMPLICATION, WITH LONG-TERM CURRENT USE OF INSULIN (HCC): Primary | ICD-10-CM

## 2022-09-15 PROCEDURE — 99214 OFFICE O/P EST MOD 30 MIN: CPT | Performed by: INTERNAL MEDICINE

## 2022-09-15 PROCEDURE — 95251 CONT GLUC MNTR ANALYSIS I&R: CPT | Performed by: INTERNAL MEDICINE

## 2022-09-15 NOTE — PROGRESS NOTES
Yaquelin Zamora 45 y o  male MRN: [id number]    Encounter: [id number]      Assessment/Plan     1  Type 2 diabetes mellitus on long-term insulin therapy   2  Insulin pump   3  Obesity  Well controlled Last A1c 6 7%  Discussed potentially resuming Ozempic, decreasing doses of insulin  The gop 1 agonist can help with glycemic management and weight loss  Based on discussion, patient would like to continue current regimen for now, consider reintroducing Ozempic in follow-up     Recommend the following at this time  Continue current regimen  -repeat labs in 3 months   -add on lipid panel to labs done 2 days ago       4 Hyperlipidemia  - continue statin therapy    3  Hypertension  4  Microalbuminuria  Blood pressure at goal  - continue current medication   Not on ACE-I/ ARB  Patient would like to discuss this with his primary care physician, has appointment next week  Would recommend considering discontinuation of amlodipine, starting low-dose lisinopril/losartan      CC: Diabetes    History of Present Illness     HPI:  Yaquelin Zamora is a 45 y o  male presents for a follow-up visit regarding diabetes management  Last seen in 2022  Last Eye exam:  2021- no diabetic retinopathy    Current regimen:    insulin pump    Synjardy 12 5-1000 mg orally twice a day     Insulin used:  novolog  Active insulin time:3 5 hr  Basal                                                               Time Rate   12 am   1 0      Time Insulin:Carb Ratio Insulin Sensitivity Factor   12A  8 0   40      Target B    States that he stopped using exam few weeks after his last visit, was noticing low blood sugars  Did not mention this till early end of the visit today       gained a few lbs   Goes to the gym 1 5 hr/ day   Previously used to be a , now owns some gas stations   Smoking on and off -  Trying to quit    Statin:  lipitor   ACE-I/ARB:  --     Home glucose monitoring: CGM interpretation   Yoly 14 days - reviewd on phone  Jessie Guardian will be scanned into chart however has not recorded all measured blood sugars  Times uses his freestyle Yoly reader and at times uses his phone  Time percentage CGM worn  71%%   Time in range 95  (goal >65-70%)  High  5%  Very high 0  Low 0  Very low 0    Average glucose 1 30 mg/dL    Says he may have had 1-2 low s in the last 6 months - related to eating less   Had symtoms - numbness    All other systems were reviewed and are negative  Review of Systems      Historical Information   Past Medical History:   Diagnosis Date    Colon polyp     Diabetes mellitus ([facility] [address] )     FBS 0700 6/17/22 was [address]     had hives of left shoulder after last colonoscopy 3-4 years ago (2019)-unknown case-PCP rx'd steroid, resolved in 2 days    Hyperlipidemia     Hypertension      Past Surgical History:   Procedure Laterality Date    COLONOSCOPY  2017    VEIN LIGATION AND STRIPPING Bilateral      Social History   Social History     Substance and Sexual Activity   Alcohol Use No     Social History     Substance and Sexual Activity   Drug Use No     Social History     Tobacco Use   Smoking Status Light Tobacco Smoker    Packs/day: 0 50    Years: 20 00    Pack years: 10 00    Types: Cigarettes   Smokeless Tobacco Never Used   Tobacco Comment    trying to quit 9/17/20 using Chantix which was unsuccessful     Family History:   Family History   Problem Relation Age of Onset    Diabetes Father     Heart disease Father     Diabetes Brother        Meds/Allergies   Current Outpatient Medications   Medication Sig Dispense Refill    amLODIPine (NORVASC) 5 mg tablet TAKE 1 TABLET (5 MG TOTAL) BY MOUTH DAILY   90 tablet 0    atorvastatin (LIPITOR) 20 mg tablet TAKE 1 TABLET BY MOUTH EVERY DAY AT NIGHT      b complex vitamins capsule Take 1 capsule by mouth daily      Coenzyme Q10 10 MG capsule Take 10 mg by mouth daily      Continuous Blood Gluc Sensor (FreeStyle Mart 14 Day Sensor) MISC 1 KIT BY MISCELLANEOUS ROUTE EVERY 14 (FOURTEEN) DAYS   Continuous Blood Gluc Sensor (FreeStyle Yoly 14 Day Sensor) MISC USE 1 SENSOR EVERY 14 DAYS TO MONITOR BLOOD SUGAR 6 each 4    Empagliflozin-metFORMIN HCl ER (Synjardy XR) 12 5-1000 MG TB24 Take one tablet (125-1000 mg) two times a day with meals 180 tablet 3    EPINEPHrine (EPIPEN) 0 3 mg/0 3 mL SOAJ EpiPen 2-Neeraj 0 3 mg/0 3 mL injection, auto-injector      multivitamin (THERAGRAN) TABS Take 1 tablet by mouth daily   NovoLOG 100 UNIT/ML injection USE 60U VIA PUMP DAILY 50 mL 3    Omalizumab (XOLAIR SC) Inject under the skin every 30 (thirty) days       Ozempic, 1 MG/DOSE, 2 MG/1 5ML SOPN Inject 1 mg under the skin once a week 9 mL 3    varenicline (CHANTIX) 1 mg tablet Take 1 tablet (1 mg total) by mouth 2 (two) times a day 180 tablet 1    varenicline (CHANTIX) 1 mg tablet Take 0 5 tablets (0 5 mg total) by mouth 2 (two) times a day for 14 days 14 tablet 0     No current facility-administered medications for this visit  Allergies   Allergen Reactions    Aspirin      Possible hives reaction    Crestor [Rosuvastatin] Hives    Invokana [Canagliflozin] Hives    Janumet [Sitagliptin-Metformin Hcl] Hives    Sitagliptin Hives       Objective   Vitals: Blood pressure 130/70, pulse 89, resp  rate (!) 98, height 5' 9" (1 753 m), weight 95 9 kg (211 lb 6 4 oz)  Physical Exam  Constitutional:       General: He is not in acute distress  Appearance: He is well-developed  He is not diaphoretic  HENT:      Head: Normocephalic and atraumatic  Eyes:      Conjunctiva/sclera: Conjunctivae normal       Pupils: Pupils are equal, round, and reactive to light  Cardiovascular:      Rate and Rhythm: Normal rate and regular rhythm  Heart sounds: Normal heart sounds  No murmur heard  Pulmonary:      Effort: Pulmonary effort is normal  No respiratory distress  Breath sounds: Normal breath sounds  No wheezing  Abdominal:      General: There is no distension  Palpations: Abdomen is soft  Tenderness: There is no abdominal tenderness  There is no guarding  Musculoskeletal:      Cervical back: Normal range of motion and neck supple  Skin:     General: Skin is warm and dry  Findings: No erythema or rash  Neurological:      Mental Status: He is alert and oriented to person, place, and time  Psychiatric:         Behavior: Behavior normal          Thought Content: Thought content normal          The history was obtained from the review of the chart, patient      Lab Results:   Lab Results   Component Value Date/Time    Hemoglobin A1C 6 7 (H) 09/13/2022 07:20 AM    Hemoglobin A1C 6 9 (H) 03/03/2022 07:02 AM    Hemoglobin A1C 6 8 (H) 10/22/2021 07:02 AM    WBC 16 06 (H) 03/03/2022 07:02 AM    WBC 16 04 (H) 02/18/2022 07:04 AM    WBC 17 74 (H) 02/14/2022 12:17 PM    Hemoglobin 17 2 (H) 03/03/2022 07:02 AM    Hemoglobin 17 0 02/18/2022 07:04 AM    Hemoglobin 17 0 02/14/2022 12:17 PM    Hematocrit 50 9 (H) 03/03/2022 07:02 AM    Hematocrit 50 3 (H) 02/18/2022 07:04 AM    Hematocrit 49 8 (H) 02/14/2022 12:17 PM    MCV 84 03/03/2022 07:02 AM    MCV 84 02/18/2022 07:04 AM    MCV 84 02/14/2022 12:17 PM    Platelets 089 93/10/3127 07:02 AM    Platelets 050 75/28/4042 07:04 AM    Platelets 672 61/51/4021 12:17 PM    BUN 13 09/13/2022 07:20 AM    BUN 14 03/03/2022 07:02 AM    BUN 13 02/14/2022 12:17 PM    Potassium 4 3 09/13/2022 07:20 AM    Potassium 4 1 03/03/2022 07:02 AM    Potassium 4 2 02/14/2022 12:17 PM    Chloride 105 09/13/2022 07:20 AM    Chloride 105 03/03/2022 07:02 AM    Chloride 100 02/14/2022 12:17 PM    CO2 24 09/13/2022 07:20 AM    CO2 25 03/03/2022 07:02 AM    CO2 21 02/14/2022 12:17 PM    Creatinine 0 97 09/13/2022 07:20 AM    Creatinine 0 82 03/03/2022 07:02 AM    Creatinine 0 90 02/14/2022 12:17 PM    AST 29 09/13/2022 07:20 AM    AST 19 02/14/2022 12:17 PM    AST 26 10/22/2021 07:02 AM    ALT 53 09/13/2022 07:20 AM    ALT 35 02/14/2022 12:17 PM    ALT 46 10/22/2021 07:02 AM    Albumin 4 1 09/13/2022 07:20 AM    Albumin 4 1 02/14/2022 12:17 PM    Albumin 3 8 10/22/2021 07:02 AM    HDL, Direct 35 (L) 03/03/2022 07:02 AM    HDL, Direct 34 (L) 10/22/2021 07:02 AM    Triglycerides 113 03/03/2022 07:02 AM    Triglycerides 117 10/22/2021 07:02 AM         Imaging Studies: I have personally reviewed pertinent reports  Portions of the record may have been created with voice recognition software  Occasional wrong word or "sound a like" substitutions may have occurred due to the inherent limitations of voice recognition software  Read the chart carefully and recognize, using context, where substitutions have occurred

## 2022-09-16 ENCOUNTER — TELEPHONE (OUTPATIENT)
Dept: ENDOCRINOLOGY | Facility: CLINIC | Age: 52
End: 2022-09-16

## 2022-09-16 DIAGNOSIS — E11.69 TYPE 2 DIABETES MELLITUS WITH OTHER SPECIFIED COMPLICATION, WITH LONG-TERM CURRENT USE OF INSULIN (HCC): Primary | ICD-10-CM

## 2022-09-16 DIAGNOSIS — Z79.4 TYPE 2 DIABETES MELLITUS WITH OTHER SPECIFIED COMPLICATION, WITH LONG-TERM CURRENT USE OF INSULIN (HCC): Primary | ICD-10-CM

## 2022-09-19 ENCOUNTER — TELEPHONE (OUTPATIENT)
Dept: OTHER | Facility: OTHER | Age: 52
End: 2022-09-19

## 2022-09-19 RX ORDER — OMALIZUMAB 150 MG/ML
INJECTION, SOLUTION SUBCUTANEOUS
COMMUNITY
Start: 2022-09-06 | End: 2023-06-20

## 2022-09-19 RX ORDER — SEMAGLUTIDE 1.34 MG/ML
1 INJECTION, SOLUTION SUBCUTANEOUS WEEKLY
COMMUNITY
Start: 2022-07-05 | End: 2023-01-31

## 2022-09-19 RX ORDER — INSULIN ASPART 100 [IU]/ML
INJECTION, SOLUTION INTRAVENOUS; SUBCUTANEOUS
COMMUNITY
Start: 2022-07-03 | End: 2023-01-31

## 2022-09-19 NOTE — TELEPHONE ENCOUNTER
Spouse calling-  Notes she just received an email from [facility] stating that they can not send out any more pods since they have not heard from the medical provider  Spouse questioning if office can get a hold of [facility] or send them a script for his pod refills   Their contact [phone]

## 2022-09-20 ENCOUNTER — OFFICE VISIT (OUTPATIENT)
Dept: FAMILY MEDICINE CLINIC | Facility: CLINIC | Age: 52
End: 2022-09-20
Payer: COMMERCIAL

## 2022-09-20 VITALS
BODY MASS INDEX: 31.55 KG/M2 | DIASTOLIC BLOOD PRESSURE: 72 MMHG | HEART RATE: 80 BPM | HEIGHT: 69 IN | RESPIRATION RATE: 16 BRPM | SYSTOLIC BLOOD PRESSURE: 132 MMHG | WEIGHT: 213 LBS | OXYGEN SATURATION: 96 %

## 2022-09-20 DIAGNOSIS — R53.83 FATIGUE, UNSPECIFIED TYPE: ICD-10-CM

## 2022-09-20 DIAGNOSIS — E55.9 VITAMIN D DEFICIENCY: ICD-10-CM

## 2022-09-20 DIAGNOSIS — Z00.00 WELL ADULT EXAM: Primary | ICD-10-CM

## 2022-09-20 DIAGNOSIS — E53.8 B12 DEFICIENCY: ICD-10-CM

## 2022-09-20 DIAGNOSIS — E11.65 TYPE 2 DIABETES MELLITUS WITH HYPERGLYCEMIA, WITH LONG-TERM CURRENT USE OF INSULIN (HCC): ICD-10-CM

## 2022-09-20 DIAGNOSIS — Z79.4 TYPE 2 DIABETES MELLITUS WITH HYPERGLYCEMIA, WITH LONG-TERM CURRENT USE OF INSULIN (HCC): ICD-10-CM

## 2022-09-20 DIAGNOSIS — Z12.5 SCREENING FOR PROSTATE CANCER: ICD-10-CM

## 2022-09-20 PROCEDURE — 3725F SCREEN DEPRESSION PERFORMED: CPT | Performed by: FAMILY MEDICINE

## 2022-09-20 PROCEDURE — 99396 PREV VISIT EST AGE 40-64: CPT | Performed by: FAMILY MEDICINE

## 2022-09-20 NOTE — TELEPHONE ENCOUNTER
Called [employer] [phone], was advised they faxed paperwork to office to be completed, found out they had incorrect fax number to office  They will fax forms to be completed

## 2022-09-20 NOTE — PROGRESS NOTES
Assessment/Plan:  Over all things are going well    I would like him to start with ozempic and try to reduce the insulin at his next endo appt   We can fill in the gaps for his BW   He says he is stressed at work but home is great     He is going to use will power to quit smoking     dont stop the xolair     1  Well adult exam    2  Type 2 diabetes mellitus with hyperglycemia, with long-term current use of insulin (HCC)    3  Fatigue, unspecified type  -     Testosterone, free, total; Future  -     TSH, 3rd generation with Free T4 reflex; Future    4  Screening for prostate cancer  -     PSA, Total Screen; Future    5  B12 deficiency  -     Vitamin B12; Future    6  Vitamin D deficiency  -     Vitamin D 25 hydroxy; Future       Subjective:      Patient ID: Elda Snider is a 45 y o  male  HPI   Here to go over chronic issues and labs / imaging studies if applicable  I think decreasing the insulin and adding ozempic would be a good choice       xolair is really working for him   Extending it out - I would think most I would extend is 8 weeks     No more chantix  But smoking 5-6 daily   Going to the gym in the am     No smoking at home or in the car      The following portions of the patient's history were reviewed and updated as appropriate: allergies, current medications, past family history, past medical history, past social history, past surgical history and problem list     Review of Systems   Constitutional: Negative for activity change, appetite change and fever  HENT: Negative for congestion, nosebleeds and trouble swallowing  Eyes: Negative for itching  Respiratory: Negative for cough and chest tightness  Cardiovascular: Negative for chest pain and palpitations  Gastrointestinal: Negative for abdominal pain, constipation, diarrhea and nausea  Endocrine: Negative for cold intolerance  Genitourinary: Negative for frequency  Musculoskeletal: Negative for gait problem and joint swelling  Skin: Negative for rash  Allergic/Immunologic: Negative for immunocompromised state  Neurological: Negative for dizziness, tremors, seizures, syncope and headaches  Psychiatric/Behavioral: Negative for hallucinations and suicidal ideas  Objective:      /72 (BP Location: Left arm, Patient Position: Sitting, Cuff Size: Large)   Pulse 80   Resp 16   Ht 5' 9" (1 753 m)   Wt 96 6 kg (213 lb)   SpO2 96%   BMI 31 45 kg/m²     Appointment on 09/13/2022   Component Date Value    Sodium 09/13/2022 135     Potassium 09/13/2022 4 3     Chloride 09/13/2022 105     CO2 09/13/2022 24     ANION GAP 09/13/2022 6     BUN 09/13/2022 13     Creatinine 09/13/2022 0 97     Glucose, Fasting 09/13/2022 119 (A)    Calcium 09/13/2022 9 3     AST 09/13/2022 29     ALT 09/13/2022 53     Alkaline Phosphatase 09/13/2022 78     Total Protein 09/13/2022 7 6     Albumin 09/13/2022 4 1     Total Bilirubin 09/13/2022 0 41     eGFR 09/13/2022 89     Creatinine, Ur 09/13/2022 43 9     Microalbum  ,U,Random 09/13/2022 15 3     Microalb Creat Ratio 09/13/2022 35 (A)    Hemoglobin A1C 09/13/2022 6 7 (A)    EAG 09/13/2022 146           Physical Exam  Vitals and nursing note reviewed  Constitutional:       General: He is not in acute distress  Appearance: He is well-developed  He is obese  HENT:      Head: Normocephalic and atraumatic  Cardiovascular:      Rate and Rhythm: Normal rate and regular rhythm  Heart sounds: Normal heart sounds  No murmur heard  Pulmonary:      Effort: Pulmonary effort is normal       Breath sounds: Normal breath sounds  No wheezing or rales  Abdominal:      General: Bowel sounds are normal  There is no distension  Palpations: Abdomen is soft  Tenderness: There is no abdominal tenderness  There is no guarding or rebound  Musculoskeletal:         General: No tenderness  Normal range of motion  Cervical back: Normal range of motion and neck supple  Lymphadenopathy:      Cervical: No cervical adenopathy  Skin:     General: Skin is warm and dry  Capillary Refill: Capillary refill takes less than 2 seconds  Findings: No rash  Neurological:      Mental Status: He is alert and oriented to person, place, and time  Cranial Nerves: No cranial nerve deficit  Sensory: No sensory deficit  Motor: No abnormal muscle tone  Psychiatric:         Behavior: Behavior normal          Thought Content:  Thought content normal          Judgment: Judgment normal              Jasmin Pena MD  [place] [id number]

## 2022-09-26 ENCOUNTER — APPOINTMENT (OUTPATIENT)
Dept: LAB | Facility: CLINIC | Age: 52
End: 2022-09-26
Payer: COMMERCIAL

## 2022-09-26 DIAGNOSIS — E55.9 VITAMIN D DEFICIENCY: ICD-10-CM

## 2022-09-26 DIAGNOSIS — Z12.5 SCREENING FOR PROSTATE CANCER: ICD-10-CM

## 2022-09-26 DIAGNOSIS — E53.8 B12 DEFICIENCY: ICD-10-CM

## 2022-09-26 DIAGNOSIS — R53.83 FATIGUE, UNSPECIFIED TYPE: ICD-10-CM

## 2022-09-26 LAB
25(OH)D3 SERPL-MCNC: 44.8 NG/ML (ref 30–100)
PSA SERPL-MCNC: 0.2 NG/ML (ref 0–4)
TSH SERPL DL<=0.05 MIU/L-ACNC: 1.94 UIU/ML (ref 0.45–4.5)
VIT B12 SERPL-MCNC: 1836 PG/ML (ref 100–900)

## 2022-09-26 PROCEDURE — 82607 VITAMIN B-12: CPT

## 2022-09-26 PROCEDURE — 36415 COLL VENOUS BLD VENIPUNCTURE: CPT

## 2022-09-26 PROCEDURE — 82306 VITAMIN D 25 HYDROXY: CPT

## 2022-09-26 PROCEDURE — 84443 ASSAY THYROID STIM HORMONE: CPT

## 2022-09-26 PROCEDURE — 84403 ASSAY OF TOTAL TESTOSTERONE: CPT

## 2022-09-26 PROCEDURE — G0103 PSA SCREENING: HCPCS

## 2022-09-26 PROCEDURE — 84402 ASSAY OF FREE TESTOSTERONE: CPT

## 2022-09-27 ENCOUNTER — OFFICE VISIT (OUTPATIENT)
Dept: FAMILY MEDICINE CLINIC | Facility: CLINIC | Age: 52
End: 2022-09-27
Payer: COMMERCIAL

## 2022-09-27 VITALS
SYSTOLIC BLOOD PRESSURE: 120 MMHG | RESPIRATION RATE: 16 BRPM | HEART RATE: 83 BPM | HEIGHT: 69 IN | WEIGHT: 208 LBS | BODY MASS INDEX: 30.81 KG/M2 | OXYGEN SATURATION: 96 % | DIASTOLIC BLOOD PRESSURE: 76 MMHG

## 2022-09-27 DIAGNOSIS — E11.65 TYPE 2 DIABETES MELLITUS WITH HYPERGLYCEMIA, WITH LONG-TERM CURRENT USE OF INSULIN (HCC): Primary | ICD-10-CM

## 2022-09-27 DIAGNOSIS — F17.200 SMOKER: ICD-10-CM

## 2022-09-27 DIAGNOSIS — E53.8 B12 DEFICIENCY: ICD-10-CM

## 2022-09-27 DIAGNOSIS — T50.905A MEDICATION SIDE EFFECT, INITIAL ENCOUNTER: ICD-10-CM

## 2022-09-27 DIAGNOSIS — Z79.4 TYPE 2 DIABETES MELLITUS WITH HYPERGLYCEMIA, WITH LONG-TERM CURRENT USE OF INSULIN (HCC): Primary | ICD-10-CM

## 2022-09-27 LAB
TESTOST FREE SERPL-MCNC: 10.4 PG/ML (ref 7.2–24)
TESTOST SERPL-MCNC: 426 NG/DL (ref 264–916)

## 2022-09-27 PROCEDURE — 99214 OFFICE O/P EST MOD 30 MIN: CPT | Performed by: FAMILY MEDICINE

## 2022-09-27 PROCEDURE — 3074F SYST BP LT 130 MM HG: CPT | Performed by: FAMILY MEDICINE

## 2022-09-27 PROCEDURE — 3078F DIAST BP <80 MM HG: CPT | Performed by: FAMILY MEDICINE

## 2022-09-27 NOTE — PROGRESS NOTES
Assessment/Plan:    1  Type 2 diabetes mellitus with hyperglycemia, with long-term current use of insulin (HCC)    2  Smoker  Comments:  use the flonase     3  B12 deficiency    4  Medication side effect, initial encounter  Comments:  sampled the 0 25 /0 5   do that for 4-6 weeks please then go to the 1mg        Subjective:      Patient ID: Paola Romo is a 45 y o  male  HPI   He wasn't feeling well after the ozempic shot   He also injected it into his leg     He has mild congestion   And left sided max facial pain     Nausea and not feeling well and decreased apetite sicne the shot     He also didn't titrate it up from 0 25mg         The following portions of the patient's history were reviewed and updated as appropriate: allergies, current medications, past family history, past medical history, past social history, past surgical history and problem list     Review of Systems   All other systems reviewed and are negative  Objective:      /76 (BP Location: Right arm, Patient Position: Sitting, Cuff Size: Large)   Pulse 83   Resp 16   Ht 5' 9" (1 753 m)   Wt 94 3 kg (208 lb)   SpO2 96%   BMI 30 72 kg/m²     Appointment on 09/26/2022   Component Date Value    Vit D, 25-Hydroxy 09/26/2022 44 8     Vitamin B-12 09/26/2022 1,836 (A)    TSH 3RD GENERATON 09/26/2022 1 940     PSA 09/26/2022 0 2           Physical Exam  Vitals and nursing note reviewed  Constitutional:       General: He is not in acute distress  Appearance: He is well-developed  He is obese  HENT:      Head: Normocephalic and atraumatic  Cardiovascular:      Rate and Rhythm: Normal rate and regular rhythm  Heart sounds: Normal heart sounds  No murmur heard  Pulmonary:      Effort: Pulmonary effort is normal       Breath sounds: Normal breath sounds  No wheezing or rales  Abdominal:      General: Bowel sounds are normal  There is no distension  Palpations: Abdomen is soft  Tenderness:  There is no abdominal tenderness  There is no guarding or rebound  Musculoskeletal:         General: No tenderness  Normal range of motion  Cervical back: Normal range of motion and neck supple  Lymphadenopathy:      Cervical: No cervical adenopathy  Skin:     General: Skin is warm and dry  Capillary Refill: Capillary refill takes less than 2 seconds  Findings: No rash  Neurological:      Mental Status: He is alert and oriented to person, place, and time  Cranial Nerves: No cranial nerve deficit  Sensory: No sensory deficit  Motor: No abnormal muscle tone  Psychiatric:         Behavior: Behavior normal          Thought Content:  Thought content normal          Judgment: Judgment normal              MD [place] [id number]

## 2022-09-29 DIAGNOSIS — I10 HYPERTENSION GOAL BP (BLOOD PRESSURE) < 140/90: ICD-10-CM

## 2022-09-29 RX ORDER — AMLODIPINE BESYLATE 5 MG/1
5 TABLET ORAL DAILY
Qty: 90 TABLET | Refills: 0 | Status: SHIPPED | OUTPATIENT
Start: 2022-09-29

## 2022-11-02 LAB
LEFT EYE DIABETIC RETINOPATHY: NORMAL
RIGHT EYE DIABETIC RETINOPATHY: NORMAL

## 2022-12-29 DIAGNOSIS — I10 HYPERTENSION GOAL BP (BLOOD PRESSURE) < 140/90: ICD-10-CM

## 2022-12-29 RX ORDER — AMLODIPINE BESYLATE 5 MG/1
5 TABLET ORAL DAILY
Qty: 90 TABLET | Refills: 0 | Status: SHIPPED | OUTPATIENT
Start: 2022-12-29

## 2022-12-30 DIAGNOSIS — Z79.4 TYPE 2 DIABETES MELLITUS WITH HYPERGLYCEMIA, WITH LONG-TERM CURRENT USE OF INSULIN (HCC): ICD-10-CM

## 2022-12-30 DIAGNOSIS — E11.65 TYPE 2 DIABETES MELLITUS WITH HYPERGLYCEMIA, WITH LONG-TERM CURRENT USE OF INSULIN (HCC): ICD-10-CM

## 2022-12-30 RX ORDER — FLASH GLUCOSE SENSOR
KIT MISCELLANEOUS
Qty: 6 EACH | Refills: 4 | Status: SHIPPED | OUTPATIENT
Start: 2022-12-30

## 2023-01-12 PROBLEM — L50.8 CHRONIC URTICARIA: Status: ACTIVE | Noted: 2023-01-12

## 2023-01-30 ENCOUNTER — APPOINTMENT (OUTPATIENT)
Dept: LAB | Facility: CLINIC | Age: 53
End: 2023-01-30

## 2023-01-30 DIAGNOSIS — D72.828 OTHER ELEVATED WHITE BLOOD CELL (WBC) COUNT: ICD-10-CM

## 2023-01-30 DIAGNOSIS — E78.00 PURE HYPERCHOLESTEROLEMIA: ICD-10-CM

## 2023-01-30 DIAGNOSIS — I10 PRIMARY HYPERTENSION: ICD-10-CM

## 2023-01-30 DIAGNOSIS — D75.1 POLYCYTHEMIA: ICD-10-CM

## 2023-01-30 DIAGNOSIS — Z79.4 TYPE 2 DIABETES MELLITUS WITH OTHER SPECIFIED COMPLICATION, WITH LONG-TERM CURRENT USE OF INSULIN (HCC): ICD-10-CM

## 2023-01-30 DIAGNOSIS — R80.9 MICROALBUMINURIA: ICD-10-CM

## 2023-01-30 DIAGNOSIS — E11.69 TYPE 2 DIABETES MELLITUS WITH OTHER SPECIFIED COMPLICATION, WITH LONG-TERM CURRENT USE OF INSULIN (HCC): ICD-10-CM

## 2023-01-30 LAB
ALBUMIN SERPL BCP-MCNC: 3.9 G/DL (ref 3.5–5)
ALP SERPL-CCNC: 84 U/L (ref 46–116)
ALT SERPL W P-5'-P-CCNC: 34 U/L (ref 12–78)
ANION GAP SERPL CALCULATED.3IONS-SCNC: 7 MMOL/L (ref 4–13)
AST SERPL W P-5'-P-CCNC: 28 U/L (ref 5–45)
BASOPHILS # BLD AUTO: 0.14 THOUSANDS/ÂΜL (ref 0–0.1)
BASOPHILS NFR BLD AUTO: 1 % (ref 0–1)
BILIRUB SERPL-MCNC: 0.4 MG/DL (ref 0.2–1)
BUN SERPL-MCNC: 15 MG/DL (ref 5–25)
CALCIUM SERPL-MCNC: 9.5 MG/DL (ref 8.3–10.1)
CHLORIDE SERPL-SCNC: 107 MMOL/L (ref 96–108)
CHOLEST SERPL-MCNC: 205 MG/DL
CO2 SERPL-SCNC: 25 MMOL/L (ref 21–32)
CREAT SERPL-MCNC: 0.84 MG/DL (ref 0.6–1.3)
EOSINOPHIL # BLD AUTO: 0.66 THOUSAND/ÂΜL (ref 0–0.61)
EOSINOPHIL NFR BLD AUTO: 5 % (ref 0–6)
ERYTHROCYTE [DISTWIDTH] IN BLOOD BY AUTOMATED COUNT: 14.3 % (ref 11.6–15.1)
EST. AVERAGE GLUCOSE BLD GHB EST-MCNC: 154 MG/DL
GFR SERPL CREATININE-BSD FRML MDRD: 99 ML/MIN/1.73SQ M
GLUCOSE P FAST SERPL-MCNC: 127 MG/DL (ref 65–99)
HBA1C MFR BLD: 7 %
HCT VFR BLD AUTO: 52 % (ref 36.5–49.3)
HDLC SERPL-MCNC: 35 MG/DL
HGB BLD-MCNC: 17.5 G/DL (ref 12–17)
IMM GRANULOCYTES # BLD AUTO: 0.08 THOUSAND/UL (ref 0–0.2)
IMM GRANULOCYTES NFR BLD AUTO: 1 % (ref 0–2)
LDLC SERPL CALC-MCNC: 138 MG/DL (ref 0–100)
LYMPHOCYTES # BLD AUTO: 4.68 THOUSANDS/ÂΜL (ref 0.6–4.47)
LYMPHOCYTES NFR BLD AUTO: 33 % (ref 14–44)
MCH RBC QN AUTO: 28.2 PG (ref 26.8–34.3)
MCHC RBC AUTO-ENTMCNC: 33.7 G/DL (ref 31.4–37.4)
MCV RBC AUTO: 84 FL (ref 82–98)
MONOCYTES # BLD AUTO: 1.12 THOUSAND/ÂΜL (ref 0.17–1.22)
MONOCYTES NFR BLD AUTO: 8 % (ref 4–12)
NEUTROPHILS # BLD AUTO: 7.52 THOUSANDS/ÂΜL (ref 1.85–7.62)
NEUTS SEG NFR BLD AUTO: 52 % (ref 43–75)
NONHDLC SERPL-MCNC: 170 MG/DL
NRBC BLD AUTO-RTO: 0 /100 WBCS
PLATELET # BLD AUTO: 329 THOUSANDS/UL (ref 149–390)
PMV BLD AUTO: 11.1 FL (ref 8.9–12.7)
POTASSIUM SERPL-SCNC: 4.7 MMOL/L (ref 3.5–5.3)
PROT SERPL-MCNC: 7.5 G/DL (ref 6.4–8.4)
RBC # BLD AUTO: 6.21 MILLION/UL (ref 3.88–5.62)
SODIUM SERPL-SCNC: 139 MMOL/L (ref 135–147)
TRIGL SERPL-MCNC: 158 MG/DL
WBC # BLD AUTO: 14.2 THOUSAND/UL (ref 4.31–10.16)

## 2023-01-31 ENCOUNTER — OFFICE VISIT (OUTPATIENT)
Dept: ENDOCRINOLOGY | Facility: CLINIC | Age: 53
End: 2023-01-31

## 2023-01-31 VITALS
BODY MASS INDEX: 31.7 KG/M2 | HEART RATE: 93 BPM | SYSTOLIC BLOOD PRESSURE: 140 MMHG | HEIGHT: 69 IN | DIASTOLIC BLOOD PRESSURE: 80 MMHG | WEIGHT: 214 LBS

## 2023-01-31 DIAGNOSIS — Z79.4 TYPE 2 DIABETES MELLITUS WITH HYPERGLYCEMIA, WITH LONG-TERM CURRENT USE OF INSULIN (HCC): ICD-10-CM

## 2023-01-31 DIAGNOSIS — E11.65 TYPE 2 DIABETES MELLITUS WITH HYPERGLYCEMIA, WITH LONG-TERM CURRENT USE OF INSULIN (HCC): ICD-10-CM

## 2023-01-31 DIAGNOSIS — E78.00 PURE HYPERCHOLESTEROLEMIA: Primary | ICD-10-CM

## 2023-01-31 RX ORDER — ATORVASTATIN CALCIUM 20 MG/1
TABLET, FILM COATED ORAL
Qty: 90 TABLET | Refills: 1 | Status: SHIPPED | OUTPATIENT
Start: 2023-01-31

## 2023-01-31 NOTE — PROGRESS NOTES
1/31/2023    Assessment/Plan      Diagnoses and all orders for this visit:    Pure hypercholesterolemia  -     atorvastatin (LIPITOR) 20 mg tablet; 1 tab daily  -     Hemoglobin A1C; Future  -     Comprehensive metabolic panel; Future  -     Lipid Panel with Direct LDL reflex; Future  -     CBC and differential; Future  -     Microalbumin / creatinine urine ratio; Future  -     TSH, 3rd generation; Future    Type 2 diabetes mellitus with hyperglycemia, with long-term current use of insulin (HCC)  -     Hemoglobin A1C; Future  -     Comprehensive metabolic panel; Future  -     Lipid Panel with Direct LDL reflex; Future  -     CBC and differential; Future  -     Microalbumin / creatinine urine ratio; Future  -     TSH, 3rd generation; Future        Assessment/Plan:  #1 type 2 diabetes with long-term insulin use and insulin pump status: Overall reasonably well controlled  Continue current regimen including Synjardy and OmniPod  I did offer the patient diabetes education to discuss other insulin pump options including newer OmniPod options  He will think about this and let us know  Insulin pump settings are as follows:  Basal rate: 1 unit/h, sensitivity 40, target 120, carb ratio 1:8    2   Hyperlipidemia: He has been off his atorvastatin  We will resume this  Repeat lipid panel prior to next appointment  3   Hypertension: Check metabolic panel and urine microalbumin prior to next appointment  CC: Diabetes follow-up    History of Present Illness     HPI: Ernie Jacob is a 47y o  year old male with type 2 diabetes who presents for a follow up  He is on oral agents and insulin at home and takes Novolog via omnipod insulin pump and Synjardy 12 5-1000 mg bid  He denies any polyuria, polydipsia, nocturia and blurry vision  He denies neuropathy, nephropathy and retinopathy  Did not feel well after ozempic shot in the past (GI intolerance)      Hypoglycemic episodes: No   He does report keeping fast acting carbohydrates nearby at all times such as candy or glucose tablets  Denies history of severe hypoglycemia  The patient's last eye exam was in Nov 2022 with no DR  Blood Sugar/Glucometer/Pump/CGM review: Insulin pump download from 1/17 through 1/31 shows an average glucose of 123 with 1 9 readings per day  He is using about 4020 units/day with 58% being basal insulin of 42% being bolus insulin  Rodney Robbi was downloaded as well from 1/18 through 1/31 showing an average glucose of 134, glucose management index of 6 5%, glucose variability of 20 9%  93% of values are within range  0% are low  7% are high  No consistent pattern of hyperglycemia no hypoglycemia noted  HLD: Not currently on atorvastatin  HTN: Not currently on amlodipine  Review of Systems   Constitutional: Negative for fatigue  HENT: Negative for trouble swallowing and voice change  Eyes: Negative for visual disturbance  Respiratory: Negative for shortness of breath  Cardiovascular: Negative for palpitations and leg swelling  Gastrointestinal: Negative for abdominal pain, nausea and vomiting  Endocrine: Negative for polydipsia and polyuria  Musculoskeletal: Negative for arthralgias and myalgias  Skin: Negative for rash  Neurological: Negative for dizziness, tremors and weakness  Hematological: Negative for adenopathy  Psychiatric/Behavioral: Negative for agitation and confusion         Historical Information   Past Medical History:   Diagnosis Date   • Colon polyp    • Diabetes mellitus ([facility] [address] )     FBS 0700 6/17/22 was 210 via Freestyle Yoly   • Hives    • Hives     had hives of left shoulder after last colonoscopy 3-4 years ago (2019)-unknown case-PCP rx'd steroid, resolved in 2 days   • Hyperlipidemia    • Hypertension      Past Surgical History:   Procedure Laterality Date   • COLONOSCOPY  2017   • VEIN LIGATION AND STRIPPING Bilateral      Social History   Social History     Substance and Sexual Activity Alcohol Use No     Social History     Substance and Sexual Activity   Drug Use No     Social History     Tobacco Use   Smoking Status Light Smoker   • Packs/day: 0 50   • Years: 20 00   • Pack years: 10 00   • Types: Cigarettes   Smokeless Tobacco Never   Tobacco Comments    trying to quit 9/17/20 using Chantix which was unsuccessful     Family History:   Family History   Problem Relation Age of Onset   • Diabetes Father    • Heart disease Father    • Diabetes Brother        Meds/Allergies   Current Outpatient Medications   Medication Sig Dispense Refill   • atorvastatin (LIPITOR) 20 mg tablet 1 tab daily 90 tablet 1   • b complex vitamins capsule Take 1 capsule by mouth daily     • Coenzyme Q10 10 MG capsule Take 10 mg by mouth daily     • Continuous Blood Gluc Sensor (FreeStyle Yoly 14 Day Sensor) MISC CHANGE EVERY 14 DAYS 6 each 4   • Empagliflozin-metFORMIN HCl ER (Synjardy XR) 12 5-1000 MG TB24 Take one tablet (125-1000 mg) two times a day with meals 180 tablet 3   • EPINEPHrine (EPIPEN) 0 3 mg/0 3 mL SOAJ EpiPen 2-Neeraj 0 3 mg/0 3 mL injection, auto-injector     • multivitamin (THERAGRAN) TABS Take 1 tablet by mouth daily  • NovoLOG 100 UNIT/ML injection USE 60U VIA PUMP DAILY 50 mL 3   • Omalizumab (XOLAIR SC) Inject under the skin every 30 (thirty) days      • amLODIPine (NORVASC) 5 mg tablet TAKE 1 TABLET (5 MG TOTAL) BY MOUTH DAILY  (Patient not taking: Reported on 1/31/2023) 90 tablet 0   • Xolair subcutaneous injection  (Patient not taking: No sig reported)       No current facility-administered medications for this visit  Allergies   Allergen Reactions   • Aspirin      Possible hives reaction   • Crestor [Rosuvastatin] Hives   • Invokana [Canagliflozin] Hives   • Janumet [Sitagliptin-Metformin Hcl] Hives   • Sitagliptin Hives       Objective   Vitals: Blood pressure 140/80, pulse 93, height 5' 9" (1 753 m), weight 97 1 kg (214 lb)    Invasive Devices     None                 Physical Exam  Vitals reviewed  Constitutional:       General: He is not in acute distress  Appearance: He is well-developed  He is not diaphoretic  HENT:      Head: Normocephalic and atraumatic  Eyes:      Conjunctiva/sclera: Conjunctivae normal       Pupils: Pupils are equal, round, and reactive to light  Neck:      Thyroid: No thyromegaly  Cardiovascular:      Rate and Rhythm: Normal rate and regular rhythm  Pulmonary:      Effort: Pulmonary effort is normal  No respiratory distress  Breath sounds: Normal breath sounds  Abdominal:      General: Bowel sounds are normal       Palpations: Abdomen is soft  Musculoskeletal:         General: Normal range of motion  Cervical back: Normal range of motion and neck supple  Skin:     General: Skin is warm and dry  Findings: No rash  Neurological:      Mental Status: He is alert and oriented to person, place, and time  Motor: No abnormal muscle tone  Psychiatric:         Behavior: Behavior normal          The history was obtained from the review of the chart and from the patient      Lab Results:    Most recent Alc is  Lab Results   Component Value Date    HGBA1C 7 0 (H) 01/30/2023           No components found for: HA1C  No components found for: GLU    Lab Results   Component Value Date    CREATININE 0 84 01/30/2023    CREATININE 0 97 09/13/2022    CREATININE 0 82 03/03/2022    BUN 15 01/30/2023    K 4 7 01/30/2023     01/30/2023    CO2 25 01/30/2023     eGFR   Date Value Ref Range Status   01/30/2023 99 ml/min/1 73sq m Final     No components found for: [facility]    Lab Results   Component Value Date    HDL 35 (L) 01/30/2023    TRIG 158 (H) 01/30/2023       Lab Results   Component Value Date    ALT 34 01/30/2023    AST 28 01/30/2023    ALKPHOS 84 01/30/2023       Lab Results   Component Value Date    FREET4 1 19 09/02/2017           Future Appointments   Date Time Provider Casi Fox   2/23/2023  9:45 AM Kaci Jackman Lu Nino MD [facility]  [address]   3/20/2023  1:20 PM Aria Javier MD [facility]       Portions of the record may have been created with voice recognition software  Occasional wrong word or "sound a like" substitutions may have occurred due to the inherent limitations of voice recognition software  Read the chart carefully and recognize, using context, where substitutions have occurred

## 2023-03-04 ENCOUNTER — NURSE TRIAGE (OUTPATIENT)
Dept: OTHER | Facility: OTHER | Age: 53
End: 2023-03-04

## 2023-03-04 DIAGNOSIS — Z20.818 PERTUSSIS EXPOSURE: Primary | ICD-10-CM

## 2023-03-04 RX ORDER — AZITHROMYCIN 250 MG/1
TABLET, FILM COATED ORAL
Qty: 6 TABLET | Refills: 0 | Status: SHIPPED | OUTPATIENT
Start: 2023-03-04 | End: 2023-03-08

## 2023-03-04 NOTE — TELEPHONE ENCOUNTER
Regarding: [place] paratertussis (2 of 2)  ----- Message from Justo Ceja sent at 3/4/2023  8:12 AM EST -----  "My daughter tested positive for borbetella paratertussis and the results say that family members should be tested too and given a prescription   Her doctor prescribed zithromax for her "

## 2023-03-04 NOTE — TELEPHONE ENCOUNTER
Patient states their daughter was seen by a pulmonologist yesterday and she tested positive for bordetella parapertussis  She was sent home with antibiotics, however parents were informed to reach out to family doctor for Zithromax prescription  Rudy Zaldivar does not have any symptoms, however Marleen does have a cough  TC sent to on call provider  Per provider call in zithromax for patient and wife  Patient made aware  Reason for Disposition  • [1] Caller has URGENT medicine question about med that PCP or specialist prescribed AND [2] triager unable to answer question    Answer Assessment - Initial Assessment Questions  1  NAME of MEDICATION: "What medicine are you calling about?"      Azithromycin    2  QUESTION: "What is your question?" (e g , medication refill, side effect)      Request    3  PRESCRIBING HCP: "Who prescribed it?" Reason: if prescribed by specialist, call should be referred to that group  PCP    4  SYMPTOMS: "Do you have any symptoms?"      Dry cough    5  SEVERITY: If symptoms are present, ask "Are they mild, moderate or severe?"      Mild     6   PREGNANCY:  "Is there any chance that you are pregnant?" "When was your last menstrual period?"      N/A    Protocols used: MEDICATION QUESTION CALL-ADULT-

## 2023-03-05 DIAGNOSIS — E11.69 TYPE 2 DIABETES MELLITUS WITH OTHER SPECIFIED COMPLICATION, WITH LONG-TERM CURRENT USE OF INSULIN (HCC): ICD-10-CM

## 2023-03-05 DIAGNOSIS — Z79.4 TYPE 2 DIABETES MELLITUS WITH OTHER SPECIFIED COMPLICATION, WITH LONG-TERM CURRENT USE OF INSULIN (HCC): ICD-10-CM

## 2023-03-06 RX ORDER — INSULIN ASPART 100 [IU]/ML
INJECTION, SOLUTION INTRAVENOUS; SUBCUTANEOUS
Qty: 50 ML | Refills: 3 | Status: SHIPPED | OUTPATIENT
Start: 2023-03-06

## 2023-03-08 ENCOUNTER — OFFICE VISIT (OUTPATIENT)
Dept: FAMILY MEDICINE CLINIC | Facility: CLINIC | Age: 53
End: 2023-03-08

## 2023-03-08 ENCOUNTER — APPOINTMENT (OUTPATIENT)
Dept: LAB | Facility: CLINIC | Age: 53
End: 2023-03-08

## 2023-03-08 VITALS
WEIGHT: 214 LBS | RESPIRATION RATE: 16 BRPM | DIASTOLIC BLOOD PRESSURE: 80 MMHG | BODY MASS INDEX: 31.7 KG/M2 | OXYGEN SATURATION: 95 % | HEIGHT: 69 IN | SYSTOLIC BLOOD PRESSURE: 140 MMHG | HEART RATE: 94 BPM

## 2023-03-08 DIAGNOSIS — R05.1 ACUTE COUGH: ICD-10-CM

## 2023-03-08 DIAGNOSIS — J02.9 SORE THROAT: ICD-10-CM

## 2023-03-08 DIAGNOSIS — Z20.818 PERTUSSIS EXPOSURE: Primary | ICD-10-CM

## 2023-03-08 DIAGNOSIS — Z20.818 PERTUSSIS EXPOSURE: ICD-10-CM

## 2023-03-08 RX ORDER — BENZONATATE 100 MG/1
100 CAPSULE ORAL 3 TIMES DAILY PRN
Qty: 20 CAPSULE | Refills: 0 | Status: SHIPPED | OUTPATIENT
Start: 2023-03-08

## 2023-03-08 NOTE — PROGRESS NOTES
Assessment/Plan:   Diagnoses and all orders for this visit:    Pertussis exposure  -     B pertussis IgG/IgM Ab; Future  Sore throat  Acute cough  -     benzonatate (TESSALON PERLES) 100 mg capsule; Take 1 capsule (100 mg total) by mouth 3 (three) times a day as needed for cough  - daughter tested POS for Pertussis  - pt's last Tdap was 2020   - was started on Zpak on 3/4/2023 - completed abx today  - advised supportive care for now - Mucinex, Flonase, Tessalon Perles   - RTO in 2days for f/u if no relief with supportive care - pt aware and agreeable           Subjective:    Patient ID: Diaz Nunez is a 47 y o  male  HPI   - daughter tested POS for Pertussis  - pt's last Tdap was 2020   - was started on Philemon Katie on 3/4/2023 - completed abx today  - (+) sore throat, dry mouth, sinus pressure and cough which is worse at night   - has been drinking a lot of water  - good PO intake   - feels like L-side of face is warm but denies fevers       The following portions of the patient's history were reviewed and updated as appropriate: allergies, current medications, past family history, past medical history, past social history, past surgical history and problem list     Review of Systems  as per HPI    Objective:  /80 (BP Location: Left arm, Patient Position: Sitting, Cuff Size: Large)   Pulse 94   Resp 16   Ht 5' 9" (1 753 m)   Wt 97 1 kg (214 lb)   SpO2 95%   BMI 31 60 kg/m²    Physical Exam  Vitals reviewed  Constitutional:       General: He is not in acute distress  Appearance: Normal appearance  He is not ill-appearing, toxic-appearing or diaphoretic  HENT:      Head: Normocephalic and atraumatic  Right Ear: Tympanic membrane, ear canal and external ear normal  There is no impacted cerumen  Left Ear: Ear canal and external ear normal  A middle ear effusion is present  There is no impacted cerumen        Nose: Nose normal       Mouth/Throat:      Mouth: Mucous membranes are moist       Pharynx: Oropharynx is clear  Posterior oropharyngeal erythema present  No oropharyngeal exudate  Eyes:      General: No scleral icterus  Right eye: No discharge  Left eye: No discharge  Extraocular Movements: Extraocular movements intact  Conjunctiva/sclera: Conjunctivae normal    Pulmonary:      Effort: Pulmonary effort is normal    Musculoskeletal:         General: Normal range of motion  Cervical back: Normal range of motion  Skin:     General: Skin is warm  Neurological:      General: No focal deficit present  Mental Status: He is alert and oriented to person, place, and time     Psychiatric:         Mood and Affect: Mood normal          Behavior: Behavior normal

## 2023-03-09 LAB
B PERT IGA SER QL IA: 1.3 INDEX (ref 0–0.9)
B PERT IGG SER-ACNC: 3.05 INDEX (ref 0–0.94)
B PERT IGM SER QL IA: <1 INDEX (ref 0–0.9)

## 2023-03-10 ENCOUNTER — OFFICE VISIT (OUTPATIENT)
Dept: FAMILY MEDICINE CLINIC | Facility: CLINIC | Age: 53
End: 2023-03-10

## 2023-03-10 VITALS
HEIGHT: 69 IN | SYSTOLIC BLOOD PRESSURE: 124 MMHG | OXYGEN SATURATION: 95 % | HEART RATE: 88 BPM | WEIGHT: 214 LBS | BODY MASS INDEX: 31.7 KG/M2 | RESPIRATION RATE: 16 BRPM | DIASTOLIC BLOOD PRESSURE: 70 MMHG

## 2023-03-10 DIAGNOSIS — Z20.818 PERTUSSIS EXPOSURE: Primary | ICD-10-CM

## 2023-03-10 NOTE — PROGRESS NOTES
Assessment/Plan:   Diagnoses and all orders for this visit:    Pertussis exposure  - daughter tested POS for bordatella parapertussis   - pt's last Tdap was 2020   - was started on Zpak on 3/4/2023 - completed Alice Puls on 3/8/2023  - was started on Tessalon Perles on 3/8/2023 for persistent cough  - "feeling much better today" and "cough is under control"   - f/u PRN - pt aware and agreeable           Subjective:    Patient ID: Ewa Colunga is a 47 y o  male  HPI   49yo M presents to the office for f/u   - daughter tested POS for Pertussis  - pt's last Tdap was 2020   - was started on Alice Puls on 3/4/2023 - completed Alice Puls on 3/8/2023  - was started on Tessalon Perles on 3/8/2023 for persistent cough  - "feeling much better today"  - "cough is under control"   - denies F/C/N/V/HA/CP/palpitations/SOB/wheezing/abd pain      The following portions of the patient's history were reviewed and updated as appropriate: allergies, current medications, past family history, past medical history, past social history, past surgical history and problem list     Review of Systems  as per HPI    Objective:  /70   Pulse 88   Resp 16   Ht 5' 9" (1 753 m)   Wt 97 1 kg (214 lb)   SpO2 95%   BMI 31 60 kg/m²    Physical Exam  Vitals reviewed  Constitutional:       General: He is not in acute distress  Appearance: Normal appearance  He is normal weight  He is not ill-appearing, toxic-appearing or diaphoretic  HENT:      Head: Normocephalic and atraumatic  Right Ear: External ear normal       Left Ear: External ear normal       Nose: Nose normal    Eyes:      General: No scleral icterus  Right eye: No discharge  Left eye: No discharge  Extraocular Movements: Extraocular movements intact  Conjunctiva/sclera: Conjunctivae normal    Cardiovascular:      Rate and Rhythm: Normal rate and regular rhythm  Heart sounds: Normal heart sounds     Pulmonary:      Effort: Pulmonary effort is normal  No respiratory distress  Breath sounds: Normal breath sounds  No stridor  No wheezing, rhonchi or rales  Musculoskeletal:         General: Normal range of motion  Skin:     General: Skin is warm  Neurological:      General: No focal deficit present  Mental Status: He is alert and oriented to person, place, and time     Psychiatric:         Mood and Affect: Mood normal          Behavior: Behavior normal

## 2023-03-13 ENCOUNTER — RA CDI HCC (OUTPATIENT)
Dept: OTHER | Facility: HOSPITAL | Age: 53
End: 2023-03-13

## 2023-03-13 NOTE — PROGRESS NOTES
[facility]  coding opportunities          Chart Reviewed number of suggestions sent to Provider: 1   [id number]    This is a reminder to address ALL HCC (risk adjustment) codes as found on active problem list for 2023 as patient scores reset to zero DEJUAN      Patients Insurance        Commercial Insurance: [address] [place] [address]

## 2023-03-20 ENCOUNTER — OFFICE VISIT (OUTPATIENT)
Dept: FAMILY MEDICINE CLINIC | Facility: CLINIC | Age: 53
End: 2023-03-20

## 2023-03-20 VITALS
OXYGEN SATURATION: 96 % | HEART RATE: 94 BPM | DIASTOLIC BLOOD PRESSURE: 80 MMHG | RESPIRATION RATE: 16 BRPM | BODY MASS INDEX: 31.84 KG/M2 | SYSTOLIC BLOOD PRESSURE: 138 MMHG | HEIGHT: 69 IN | WEIGHT: 215 LBS

## 2023-03-20 DIAGNOSIS — Z79.4 TYPE 2 DIABETES MELLITUS WITH HYPERGLYCEMIA, WITH LONG-TERM CURRENT USE OF INSULIN (HCC): Primary | ICD-10-CM

## 2023-03-20 DIAGNOSIS — D75.1 POLYCYTHEMIA: ICD-10-CM

## 2023-03-20 DIAGNOSIS — D72.829 LEUKOCYTOSIS, UNSPECIFIED TYPE: ICD-10-CM

## 2023-03-20 DIAGNOSIS — F17.200 SMOKER: ICD-10-CM

## 2023-03-20 DIAGNOSIS — E11.65 TYPE 2 DIABETES MELLITUS WITH HYPERGLYCEMIA, WITH LONG-TERM CURRENT USE OF INSULIN (HCC): Primary | ICD-10-CM

## 2023-03-20 DIAGNOSIS — R05.2 SUBACUTE COUGH: ICD-10-CM

## 2023-03-20 DIAGNOSIS — L50.8 CHRONIC URTICARIA: ICD-10-CM

## 2023-03-20 DIAGNOSIS — N52.9 ERECTILE DYSFUNCTION, UNSPECIFIED ERECTILE DYSFUNCTION TYPE: ICD-10-CM

## 2023-03-20 DIAGNOSIS — E66.09 CLASS 1 OBESITY DUE TO EXCESS CALORIES WITH SERIOUS COMORBIDITY AND BODY MASS INDEX (BMI) OF 31.0 TO 31.9 IN ADULT: ICD-10-CM

## 2023-03-20 DIAGNOSIS — E78.00 PURE HYPERCHOLESTEROLEMIA: ICD-10-CM

## 2023-03-20 DIAGNOSIS — M77.02 MEDIAL EPICONDYLITIS OF LEFT ELBOW: ICD-10-CM

## 2023-03-20 RX ORDER — OMEPRAZOLE 40 MG/1
40 CAPSULE, DELAYED RELEASE ORAL DAILY
Qty: 14 CAPSULE | Refills: 0 | Status: SHIPPED | OUTPATIENT
Start: 2023-03-20

## 2023-03-20 RX ORDER — AZITHROMYCIN 250 MG/1
TABLET, FILM COATED ORAL
Qty: 6 TABLET | Refills: 0 | Status: SHIPPED | OUTPATIENT
Start: 2023-03-20 | End: 2023-03-25

## 2023-03-20 RX ORDER — SILDENAFIL 100 MG/1
100 TABLET, FILM COATED ORAL DAILY PRN
Qty: 30 TABLET | Refills: 0 | Status: SHIPPED | OUTPATIENT
Start: 2023-03-20

## 2023-03-20 NOTE — PROGRESS NOTES
Assessment/Plan:  Cont with endo   We can use PPI and another round of abx for the cough   He needs to quit smoking   Has everything he needs too   bp controlled   LDL ontrolled   Follows with endo   We can use silden for the ED   The polycy - is from smoking   The leukocytosis is from smoking and the chronic urticaira   And can use voltaren to the elbow   And ice     1  Type 2 diabetes mellitus with hyperglycemia, with long-term current use of insulin ([facility])    2  Smoker    3  Pure hypercholesterolemia    4  Subacute cough  -     omeprazole (PriLOSEC) 40 MG capsule; Take 1 capsule (40 mg total) by mouth daily  -     azithromycin (Zithromax) 250 mg tablet; Take 2 tablets (500 mg total) by mouth daily for 1 day, THEN 1 tablet (250 mg total) daily for 4 days  5  Medial epicondylitis of left elbow  -     Diclofenac Sodium (VOLTAREN) 1 %; Apply 2 g topically 4 (four) times a day    6  Erectile dysfunction, unspecified erectile dysfunction type  -     sildenafil (VIAGRA) 100 mg tablet; Take 1 tablet (100 mg total) by mouth daily as needed for erectile dysfunction    7  Polycythemia    8  Leukocytosis, unspecified type    9  Chronic urticaria    10  Class 1 obesity due to excess calories with serious comorbidity and body mass index (BMI) of 31 0 to 31 9 in adult       Subjective:      Patient ID: Hafsa Whiteside is a 47 y o  male  HPI     Here to go over chronic issues and labs / imaging studies if applicable  Coughing with chest tightness any hot liquid --> coughing     Left elbow hurt it at the gym     The following portions of the patient's history were reviewed and updated as appropriate: allergies, current medications, past family history, past medical history, past social history, past surgical history and problem list     Review of Systems   Constitutional: Negative for activity change, appetite change and fever  HENT: Negative for congestion, nosebleeds and trouble swallowing  Eyes: Negative for itching  Respiratory: Positive for cough  Negative for chest tightness  Cardiovascular: Negative for chest pain and palpitations  Gastrointestinal: Negative for abdominal pain, constipation, diarrhea and nausea  Endocrine: Negative for cold intolerance  Genitourinary: Negative for frequency  Musculoskeletal: Positive for arthralgias  Negative for gait problem and joint swelling  Skin: Negative for rash  Allergic/Immunologic: Negative for immunocompromised state  Neurological: Negative for dizziness, tremors, seizures, syncope and headaches  Psychiatric/Behavioral: Negative for hallucinations and suicidal ideas  Objective:      /80 (BP Location: Left arm, Patient Position: Sitting, Cuff Size: Standard)   Pulse 94   Resp 16   Ht 5' 9" (1 753 m)   Wt 97 5 kg (215 lb)   SpO2 96%   BMI 31 75 kg/m²     Appointment on 03/08/2023   Component Date Value   • Bordetella pertussis IgA 03/08/2023 1 3 (H)    • Bordetella pertussis IgG 03/08/2023 3 05 (H)    • Bordetella pertussis IgM 03/08/2023 <1 0           Physical Exam  Vitals and nursing note reviewed  Constitutional:       General: He is not in acute distress  Appearance: He is well-developed  He is obese  HENT:      Head: Normocephalic and atraumatic  Cardiovascular:      Rate and Rhythm: Normal rate and regular rhythm  Pulses: no weak pulses          Dorsalis pedis pulses are 2+ on the right side and 2+ on the left side  Heart sounds: Normal heart sounds  No murmur heard  Pulmonary:      Effort: Pulmonary effort is normal       Breath sounds: Normal breath sounds  No wheezing (anterior left chest ) or rales  Abdominal:      General: Bowel sounds are normal  There is no distension  Palpations: Abdomen is soft  Tenderness: There is no abdominal tenderness  There is no guarding or rebound  Musculoskeletal:         General: No tenderness  Normal range of motion        Cervical back: Normal range of motion and neck supple  Feet:      Right foot:      Skin integrity: Callus and dry skin present  No ulcer, skin breakdown, erythema or warmth  Left foot:      Skin integrity: Callus and dry skin present  No ulcer, skin breakdown, erythema or warmth  Lymphadenopathy:      Cervical: No cervical adenopathy  Skin:     General: Skin is warm and dry  Capillary Refill: Capillary refill takes less than 2 seconds  Findings: No rash  Neurological:      Mental Status: He is alert and oriented to person, place, and time  Cranial Nerves: No cranial nerve deficit  Sensory: No sensory deficit  Motor: No abnormal muscle tone  Psychiatric:         Behavior: Behavior normal          Thought Content: Thought content normal          Judgment: Judgment normal              Patient's shoes and socks removed  Right Foot/Ankle   Right Foot Inspection  Skin Exam: skin normal, skin intact, dry skin, callus and callus  No warmth, no erythema, no maceration, no abnormal color, no pre-ulcer and no ulcer  Toe Exam: ROM and strength within normal limits  Sensory   Monofilament testing: intact    Vascular  Capillary refills: < 3 seconds  The right DP pulse is 2+  Left Foot/Ankle  Left Foot Inspection  Skin Exam: skin normal, skin intact, dry skin and callus  No warmth, no erythema, no maceration, normal color, no pre-ulcer and no ulcer  Toe Exam: ROM and strength within normal limits  Sensory   Monofilament testing: intact    Vascular  Capillary refills: < 3 seconds  The left DP pulse is 2+       Assign Risk Category  No deformity present  No loss of protective sensation  No weak pulses  Risk: 0        MD Adolfo Domingo 40

## 2023-03-26 DIAGNOSIS — I10 HYPERTENSION GOAL BP (BLOOD PRESSURE) < 140/90: ICD-10-CM

## 2023-03-27 DIAGNOSIS — Z79.4 TYPE 2 DIABETES MELLITUS WITH OTHER SPECIFIED COMPLICATION, WITH LONG-TERM CURRENT USE OF INSULIN (HCC): ICD-10-CM

## 2023-03-27 DIAGNOSIS — E11.69 TYPE 2 DIABETES MELLITUS WITH OTHER SPECIFIED COMPLICATION, WITH LONG-TERM CURRENT USE OF INSULIN (HCC): ICD-10-CM

## 2023-03-27 RX ORDER — AMLODIPINE BESYLATE 5 MG/1
5 TABLET ORAL DAILY
Qty: 90 TABLET | Refills: 0 | Status: SHIPPED | OUTPATIENT
Start: 2023-03-27

## 2023-03-27 RX ORDER — EMPAGLIFLOZIN, METFORMIN HYDROCHLORIDE 12.5; 1 MG/1; MG/1
TABLET, EXTENDED RELEASE ORAL
Qty: 180 TABLET | Refills: 3 | Status: SHIPPED | OUTPATIENT
Start: 2023-03-27

## 2023-04-05 ENCOUNTER — TELEPHONE (OUTPATIENT)
Dept: FAMILY MEDICINE CLINIC | Facility: CLINIC | Age: 53
End: 2023-04-05

## 2023-04-05 NOTE — TELEPHONE ENCOUNTER
Patient's wife called to ask that a new script be sent to [facility] for Diabetic stockings

## 2023-04-06 DIAGNOSIS — Z79.4 TYPE 2 DIABETES MELLITUS WITH HYPERGLYCEMIA, WITH LONG-TERM CURRENT USE OF INSULIN (HCC): Primary | ICD-10-CM

## 2023-04-06 DIAGNOSIS — E11.65 TYPE 2 DIABETES MELLITUS WITH HYPERGLYCEMIA, WITH LONG-TERM CURRENT USE OF INSULIN (HCC): Primary | ICD-10-CM

## 2023-04-27 ENCOUNTER — TELEPHONE (OUTPATIENT)
Dept: FAMILY MEDICINE CLINIC | Facility: CLINIC | Age: 53
End: 2023-04-27

## 2023-04-27 NOTE — TELEPHONE ENCOUNTER
Patients wife called and advised patient has the GI virus that is going around  It has been in the household  Patient was having severe abdominal pain and vomiting and diarrhea  He also has diabetes  I advised if she was really concerned and he was having a lot of pain she should evaluate and if needed go to the ER

## 2023-05-30 ENCOUNTER — APPOINTMENT (OUTPATIENT)
Dept: LAB | Facility: CLINIC | Age: 53
End: 2023-05-30

## 2023-05-30 DIAGNOSIS — E11.65 TYPE 2 DIABETES MELLITUS WITH HYPERGLYCEMIA, WITH LONG-TERM CURRENT USE OF INSULIN (HCC): ICD-10-CM

## 2023-05-30 DIAGNOSIS — E78.00 PURE HYPERCHOLESTEROLEMIA: ICD-10-CM

## 2023-05-30 DIAGNOSIS — Z79.4 TYPE 2 DIABETES MELLITUS WITH HYPERGLYCEMIA, WITH LONG-TERM CURRENT USE OF INSULIN (HCC): ICD-10-CM

## 2023-05-30 LAB
ALBUMIN SERPL BCP-MCNC: 3.8 G/DL (ref 3.5–5)
ALP SERPL-CCNC: 86 U/L (ref 46–116)
ALT SERPL W P-5'-P-CCNC: 38 U/L (ref 12–78)
ANION GAP SERPL CALCULATED.3IONS-SCNC: 2 MMOL/L (ref 4–13)
AST SERPL W P-5'-P-CCNC: 33 U/L (ref 5–45)
BASOPHILS # BLD AUTO: 0.13 THOUSANDS/ÂΜL (ref 0–0.1)
BASOPHILS NFR BLD AUTO: 1 % (ref 0–1)
BILIRUB SERPL-MCNC: 0.37 MG/DL (ref 0.2–1)
BUN SERPL-MCNC: 13 MG/DL (ref 5–25)
CALCIUM SERPL-MCNC: 8.9 MG/DL (ref 8.3–10.1)
CHLORIDE SERPL-SCNC: 108 MMOL/L (ref 96–108)
CHOLEST SERPL-MCNC: 144 MG/DL
CO2 SERPL-SCNC: 26 MMOL/L (ref 21–32)
CREAT SERPL-MCNC: 0.85 MG/DL (ref 0.6–1.3)
CREAT UR-MCNC: 14.9 MG/DL
EOSINOPHIL # BLD AUTO: 0.57 THOUSAND/ÂΜL (ref 0–0.61)
EOSINOPHIL NFR BLD AUTO: 4 % (ref 0–6)
ERYTHROCYTE [DISTWIDTH] IN BLOOD BY AUTOMATED COUNT: 14.2 % (ref 11.6–15.1)
EST. AVERAGE GLUCOSE BLD GHB EST-MCNC: 151 MG/DL
GFR SERPL CREATININE-BSD FRML MDRD: 99 ML/MIN/1.73SQ M
GLUCOSE P FAST SERPL-MCNC: 140 MG/DL (ref 65–99)
HBA1C MFR BLD: 6.9 %
HCT VFR BLD AUTO: 49.2 % (ref 36.5–49.3)
HDLC SERPL-MCNC: 28 MG/DL
HGB BLD-MCNC: 16.9 G/DL (ref 12–17)
IMM GRANULOCYTES # BLD AUTO: 0.04 THOUSAND/UL (ref 0–0.2)
IMM GRANULOCYTES NFR BLD AUTO: 0 % (ref 0–2)
LDLC SERPL CALC-MCNC: 92 MG/DL (ref 0–100)
LYMPHOCYTES # BLD AUTO: 3.92 THOUSANDS/ÂΜL (ref 0.6–4.47)
LYMPHOCYTES NFR BLD AUTO: 30 % (ref 14–44)
MCH RBC QN AUTO: 28.6 PG (ref 26.8–34.3)
MCHC RBC AUTO-ENTMCNC: 34.3 G/DL (ref 31.4–37.4)
MCV RBC AUTO: 83 FL (ref 82–98)
MICROALBUMIN UR-MCNC: 10 MG/L (ref 0–20)
MICROALBUMIN/CREAT 24H UR: 67 MG/G CREATININE (ref 0–30)
MONOCYTES # BLD AUTO: 0.92 THOUSAND/ÂΜL (ref 0.17–1.22)
MONOCYTES NFR BLD AUTO: 7 % (ref 4–12)
NEUTROPHILS # BLD AUTO: 7.62 THOUSANDS/ÂΜL (ref 1.85–7.62)
NEUTS SEG NFR BLD AUTO: 58 % (ref 43–75)
NRBC BLD AUTO-RTO: 0 /100 WBCS
PLATELET # BLD AUTO: 262 THOUSANDS/UL (ref 149–390)
PMV BLD AUTO: 11.8 FL (ref 8.9–12.7)
POTASSIUM SERPL-SCNC: 3.8 MMOL/L (ref 3.5–5.3)
PROT SERPL-MCNC: 7.1 G/DL (ref 6.4–8.4)
RBC # BLD AUTO: 5.9 MILLION/UL (ref 3.88–5.62)
SODIUM SERPL-SCNC: 136 MMOL/L (ref 135–147)
TRIGL SERPL-MCNC: 122 MG/DL
TSH SERPL DL<=0.05 MIU/L-ACNC: 1.75 UIU/ML (ref 0.45–4.5)
WBC # BLD AUTO: 13.2 THOUSAND/UL (ref 4.31–10.16)

## 2023-05-31 ENCOUNTER — OFFICE VISIT (OUTPATIENT)
Dept: ENDOCRINOLOGY | Facility: CLINIC | Age: 53
End: 2023-05-31

## 2023-05-31 VITALS
HEART RATE: 88 BPM | WEIGHT: 213 LBS | DIASTOLIC BLOOD PRESSURE: 68 MMHG | BODY MASS INDEX: 31.55 KG/M2 | SYSTOLIC BLOOD PRESSURE: 130 MMHG | HEIGHT: 69 IN

## 2023-05-31 DIAGNOSIS — R80.9 MICROALBUMINURIA: ICD-10-CM

## 2023-05-31 DIAGNOSIS — I10 HYPERTENSION GOAL BP (BLOOD PRESSURE) < 140/90: ICD-10-CM

## 2023-05-31 DIAGNOSIS — Z79.4 TYPE 2 DIABETES MELLITUS WITH OTHER SPECIFIED COMPLICATION, WITH LONG-TERM CURRENT USE OF INSULIN (HCC): Primary | ICD-10-CM

## 2023-05-31 DIAGNOSIS — E11.69 TYPE 2 DIABETES MELLITUS WITH OTHER SPECIFIED COMPLICATION, WITH LONG-TERM CURRENT USE OF INSULIN (HCC): Primary | ICD-10-CM

## 2023-05-31 DIAGNOSIS — E66.9 CLASS 1 OBESITY WITH BODY MASS INDEX (BMI) OF 30.0 TO 30.9 IN ADULT, UNSPECIFIED OBESITY TYPE, UNSPECIFIED WHETHER SERIOUS COMORBIDITY PRESENT: ICD-10-CM

## 2023-05-31 DIAGNOSIS — I10 PRIMARY HYPERTENSION: ICD-10-CM

## 2023-05-31 PROBLEM — E11.9 DIABETES MELLITUS (HCC): Status: ACTIVE | Noted: 2021-10-25

## 2023-05-31 RX ORDER — LISINOPRIL 5 MG/1
5 TABLET ORAL DAILY
Qty: 90 TABLET | Refills: 3 | Status: SHIPPED | OUTPATIENT
Start: 2023-05-31

## 2023-05-31 NOTE — PROGRESS NOTES
Roselyn Rob 47 y o  male MRN: [id number]    Encounter: [id number]      Assessment/Plan     1  Type 2 diabetes mellitus on insulin therapy  2  Obesity  3  Insulin pump  Well controlled with last A1c 6 9%  Has some variability in blood sugars which are likely diet related  Reports no recent hypoglycemia  Pump download from 2023, patient did not have his OmniPod-and so a more recent download could not be done  Recommend the following at this time  Continue current settings for now  Follow-up in 3 months with repeat labs  Advised patient to check blood sugars more often at least once in 8 hours so that there are no gaps in his blood sugar trends    4  Hyperlipidemia - improved   - continue statin therapy    5  Hypertension  6  Microlbuminuria  Blood pressure at goal  Discontinue amlodipine  Start lisinopril milligrams orally daily  Advised ambulatory blood pressure monitoring, to let his primary care physician/me know if he has high blood pressures which may warrant an adjustment to his medication dose      CC: Diabetes    History of Present Illness     HPI:  Roselyn Rob is a 47 y o  male presents for a follow-up visit regarding diabetes management     Also has hypertension, hyperlipidemia, microalbuminuria, obesity    Last seen in 2023  Last Eye exam: 2022 - No DR     Current regimen:   insulin pump    Synjardy 12 5-1000 mg orally twice a day     Insulin used:  novolog  Active insulin time:3 5 hr  Basal                                                               Time Rate   12 am   1 0      Time Insulin:Carb Ratio Insulin Sensitivity Factor   12A  8 0   40      Target B    More energetic than before   Exercises every morning   Weight stable     Lost a few lbs  Statin: Lipitor  ACE-I/ARB:  --     Home glucose monitoring: CGM interpretation    Time percentage CGM worn %   Time in range  (goal >65-70%)  High   Very high  Low  Very low     CV    % (goal <33%)  SD (goal <50)     Average glucose 150 mg/dL  GMI --  %    Symptoms of hypoglycemia :  No lows     All other systems were reviewed and are negative  Review of Systems      Historical Information   Past Medical History:   Diagnosis Date   • Allergic rhinitis    • Colon polyp    • Diabetes mellitus ([facility] [address] )     FBS 0700 6/17/22 was 210 via Freestyle Yoly   • Hives    • Hives     had hives of left shoulder after last colonoscopy 3-4 years ago (2019)-unknown case-PCP rx'd steroid, resolved in 2 days   • Hyperlipidemia    • Hypertension      Past Surgical History:   Procedure Laterality Date   • COLONOSCOPY  2017   • VEIN LIGATION AND STRIPPING Bilateral      Social History   Social History     Substance and Sexual Activity   Alcohol Use No     Social History     Substance and Sexual Activity   Drug Use No     Social History     Tobacco Use   Smoking Status Light Smoker   • Packs/day: 0 50   • Years: 20 00   • Total pack years: 10 00   • Types: Cigarettes   Smokeless Tobacco Never   Tobacco Comments    trying to quit 9/17/20 using Chantix which was unsuccessful     Family History:   Family History   Problem Relation Age of Onset   • Diabetes Father    • Heart disease Father    • Diabetes Brother        Meds/Allergies   Current Outpatient Medications   Medication Sig Dispense Refill   • amLODIPine (NORVASC) 5 mg tablet TAKE 1 TABLET (5 MG TOTAL) BY MOUTH DAILY  90 tablet 0   • atorvastatin (LIPITOR) 20 mg tablet 1 tab daily 90 tablet 1   • b complex vitamins capsule Take 1 capsule by mouth daily     • Coenzyme Q10 10 MG capsule Take 10 mg by mouth daily     • Continuous Blood Gluc Sensor (FreeStyle Yoly 14 Day Sensor) MISC CHANGE EVERY 14 DAYS 6 each 4   • Diclofenac Sodium (VOLTAREN) 1 % Apply 2 g topically 4 (four) times a day 100 g 0   • EPINEPHrine (EPIPEN) 0 3 mg/0 3 mL SOAJ EpiPen 2-Neeraj 0 3 mg/0 3 mL injection, auto-injector     • multivitamin (THERAGRAN) TABS Take 1 tablet by mouth daily       • NovoLOG 100 UNIT/ML injection USE 60U VIA PUMP DAILY 50 "mL 3   • Omalizumab (XOLAIR SC) Inject under the skin every 30 (thirty) days      • omeprazole (PriLOSEC) 40 MG capsule Take 1 capsule (40 mg total) by mouth daily 14 capsule 0   • sildenafil (VIAGRA) 100 mg tablet Take 1 tablet (100 mg total) by mouth daily as needed for erectile dysfunction 30 tablet 0   • Synjardy XR 12 5-1000 MG TB24 TAKE ONE TABLET (125-1000 MG) TWO TIMES A DAY WITH MEALS 180 tablet 3   • Xolair subcutaneous injection      • benzonatate (TESSALON PERLES) 100 mg capsule Take 1 capsule (100 mg total) by mouth 3 (three) times a day as needed for cough (Patient not taking: Reported on 5/31/2023) 20 capsule 0     No current facility-administered medications for this visit  Allergies   Allergen Reactions   • Aspirin      Possible hives reaction   • Crestor [Rosuvastatin] Hives   • Invokana [Canagliflozin] Hives   • Janumet [Sitagliptin-Metformin Hcl] Hives   • Sitagliptin Hives       Objective   Vitals: Blood pressure 130/68, pulse 88, height 5' 9\" (1 753 m), weight 96 6 kg (213 lb)  Physical Exam  Constitutional:       General: He is not in acute distress  Appearance: He is well-developed  He is not diaphoretic  HENT:      Head: Normocephalic and atraumatic  Eyes:      Conjunctiva/sclera: Conjunctivae normal       Pupils: Pupils are equal, round, and reactive to light  Cardiovascular:      Rate and Rhythm: Normal rate and regular rhythm  Heart sounds: Normal heart sounds  No murmur heard  Pulmonary:      Effort: Pulmonary effort is normal  No respiratory distress  Breath sounds: Normal breath sounds  No wheezing  Abdominal:      General: There is no distension  Palpations: Abdomen is soft  Tenderness: There is no abdominal tenderness  There is no guarding  Musculoskeletal:      Cervical back: Normal range of motion and neck supple  Skin:     General: Skin is warm and dry  Findings: No erythema or rash     Neurological:      Mental Status: He is alert " and oriented to person, place, and time  Psychiatric:         Behavior: Behavior normal          Thought Content: Thought content normal          The history was obtained from the review of the chart, patient  Lab Results:   Lab Results   Component Value Date/Time    Albumin 3 8 05/30/2023 07:03 AM    Albumin 3 9 01/30/2023 07:09 AM    Albumin 4 1 09/13/2022 07:20 AM    ALT 38 05/30/2023 07:03 AM    ALT 34 01/30/2023 07:09 AM    ALT 53 09/13/2022 07:20 AM    AST 33 05/30/2023 07:03 AM    AST 28 01/30/2023 07:09 AM    AST 29 09/13/2022 07:20 AM    BUN 13 05/30/2023 07:03 AM    BUN 15 01/30/2023 07:09 AM    BUN 13 09/13/2022 07:20 AM    Chloride 108 05/30/2023 07:03 AM    Chloride 107 01/30/2023 07:09 AM    Chloride 105 09/13/2022 07:20 AM    CO2 26 05/30/2023 07:03 AM    CO2 25 01/30/2023 07:09 AM    CO2 24 09/13/2022 07:20 AM    Creatinine 0 85 05/30/2023 07:03 AM    Creatinine 0 84 01/30/2023 07:09 AM    Creatinine 0 97 09/13/2022 07:20 AM    Hematocrit 49 2 05/30/2023 07:03 AM    Hematocrit 52 0 (H) 01/30/2023 07:09 AM    HDL, Direct 28 (L) 05/30/2023 07:03 AM    HDL, Direct 35 (L) 01/30/2023 07:09 AM    Hemoglobin 16 9 05/30/2023 07:03 AM    Hemoglobin 17 5 (H) 01/30/2023 07:09 AM    Hemoglobin A1C 6 9 (H) 05/30/2023 07:03 AM    Hemoglobin A1C 7 0 (H) 01/30/2023 07:09 AM    Hemoglobin A1C 6 7 (H) 09/13/2022 07:20 AM    Potassium 3 8 05/30/2023 07:03 AM    Potassium 4 7 01/30/2023 07:09 AM    Potassium 4 3 09/13/2022 07:20 AM    MCV 83 05/30/2023 07:03 AM    MCV 84 01/30/2023 07:09 AM    Platelets 133 41/99/5257 07:03 AM    Platelets 345 22/38/0342 07:09 AM    Total Protein 7 1 05/30/2023 07:03 AM    Total Protein 7 5 01/30/2023 07:09 AM    Total Protein 7 6 09/13/2022 07:20 AM    Triglycerides 122 05/30/2023 07:03 AM    Triglycerides 158 (H) 01/30/2023 07:09 AM    WBC 13 20 (H) 05/30/2023 07:03 AM    WBC 14 20 (H) 01/30/2023 07:09 AM         Imaging Studies: I have personally reviewed pertinent reports  "    Portions of the record may have been created with voice recognition software  Occasional wrong word or \"sound a like\" substitutions may have occurred due to the inherent limitations of voice recognition software  Read the chart carefully and recognize, using context, where substitutions have occurred      "

## 2023-05-31 NOTE — PATIENT INSTRUCTIONS
Discontinue amlodipine  Start lisinopril  Recommend checking blood pressures at home, if you notice high blood pressure, please let either your primary care physician or me know  Continue current settings of the insulin pump, Synjardy  Follow-up in 3 months with repeat labs

## 2023-07-05 ENCOUNTER — VBI (OUTPATIENT)
Dept: ADMINISTRATIVE | Facility: OTHER | Age: 53
End: 2023-07-05

## 2023-07-16 ENCOUNTER — HOSPITAL ENCOUNTER (EMERGENCY)
Facility: HOSPITAL | Age: 53
Discharge: HOME | End: 2023-07-16
Attending: EMERGENCY MEDICINE | Admitting: EMERGENCY MEDICINE
Payer: COMMERCIAL

## 2023-07-16 ENCOUNTER — APPOINTMENT (EMERGENCY)
Dept: RADIOLOGY | Facility: HOSPITAL | Age: 53
End: 2023-07-16
Payer: COMMERCIAL

## 2023-07-16 VITALS
TEMPERATURE: 98.3 F | BODY MASS INDEX: 31.84 KG/M2 | SYSTOLIC BLOOD PRESSURE: 139 MMHG | HEART RATE: 93 BPM | WEIGHT: 215 LBS | OXYGEN SATURATION: 99 % | RESPIRATION RATE: 20 BRPM | HEIGHT: 69 IN | DIASTOLIC BLOOD PRESSURE: 60 MMHG

## 2023-07-16 DIAGNOSIS — M79.601 RIGHT ARM PAIN: Primary | ICD-10-CM

## 2023-07-16 PROCEDURE — 99283 EMERGENCY DEPT VISIT LOW MDM: CPT

## 2023-07-16 PROCEDURE — 73080 X-RAY EXAM OF ELBOW: CPT

## 2023-07-16 RX ORDER — ACETAMINOPHEN 325 MG/1
975 TABLET ORAL ONCE
Status: COMPLETED | OUTPATIENT
Start: 2023-07-16 | End: 2023-07-16

## 2023-07-16 RX ADMIN — ACETAMINOPHEN 975 MG: 325 TABLET, FILM COATED ORAL at 16:36

## 2023-07-16 NOTE — DISCHARGE INSTRUCTIONS
You can continue taking tylenol and motrin every 6 hours as needed for pain. You should apply ice to the area intermittently to help with pain and swelling. If you have persistent symptoms, you should follow up with your primary care physician and orthopedics. Please return to the emergency department if you develop worsening symptoms, severe pain, weakness, numbness, or anything else concerning to you.

## 2023-07-16 NOTE — ED PROVIDER NOTES
History  Chief Complaint   Patient presents with   • Arm Pain     PT "I was bowling about a hour ago and I heard pop on my right arm"      50-year-old male with no pertinent past medical history who presents for evaluation of right arm pain. Patient reports that approximately 1 hour prior to arrival, he was bowling when he threw the ball and felt a pop along the ulnar aspect of his right antecubital region. He had immediate pain in the area and subsequently noticed a small amount of swelling. This prompted him to seek evaluation. He denies any direct trauma or injury to the area. He denies any weakness or numbness in the extremity. He did not take any medications for pain prior to arrival.          Prior to Admission Medications   Prescriptions Last Dose Informant Patient Reported? Taking?    Coenzyme Q10 10 MG capsule   Yes Yes   Sig: Take 10 mg by mouth daily   Continuous Blood Gluc Sensor (Lixto SoftwareStyle Yoly 14 Day Sensor) MISC   No Yes   Sig: CHANGE EVERY 14 DAYS   Diclofenac Sodium (VOLTAREN) 1 % Not Taking  No No   Sig: Apply 2 g topically 4 (four) times a day   Patient not taking: Reported on 2023   EPINEPHrine (EPIPEN) 0.3 mg/0.3 mL SOAJ   Yes Yes   Sig: EpiPen 2-Neeraj 0.3 mg/0.3 mL injection, auto-injector   NovoLOG 100 UNIT/ML injection   No Yes   Sig: USE 60U VIA PUMP DAILY   Omalizumab (XOLAIR SC)  Spouse/Significant Other Yes No   Sig: Inject under the skin every 30 (thirty) days    Synjardy XR 12.5-1000 MG TB24   No Yes   Sig: TAKE ONE TABLET (125-1000 MG) TWO TIMES A DAY WITH MEALS   atorvastatin (LIPITOR) 20 mg tablet   No Yes   Si tab daily   b complex vitamins capsule   Yes Yes   Sig: Take 1 capsule by mouth daily   benzonatate (TESSALON PERLES) 100 mg capsule   No No   Sig: Take 1 capsule (100 mg total) by mouth 3 (three) times a day as needed for cough   Patient not taking: Reported on 2023   lisinopril (ZESTRIL) 5 mg tablet   No Yes   Sig: Take 1 tablet (5 mg total) by mouth daily multivitamin (THERAGRAN) TABS  Spouse/Significant Other Yes Yes   Sig: Take 1 tablet by mouth daily. omalizumab Malick Lorne) subcutaneous injection 5/28/2023  No Yes   Sig: Inject 2 mL (300 mg total) under the skin every 8 weeks   omeprazole (PriLOSEC) 40 MG capsule Not Taking  No No   Sig: Take 1 capsule (40 mg total) by mouth daily   Patient not taking: Reported on 7/16/2023   sildenafil (VIAGRA) 100 mg tablet Not Taking  No No   Sig: Take 1 tablet (100 mg total) by mouth daily as needed for erectile dysfunction   Patient not taking: Reported on 7/16/2023      Facility-Administered Medications: None       Past Medical History:   Diagnosis Date   • Allergic rhinitis    • Colon polyp    • Diabetes mellitus ([facility])     FBS 0700 6/17/22 was 210 via Freestyle Yoly   • Hives    • Hives     had hives of left shoulder after last colonoscopy 3-4 years ago (2019)-unknown case-PCP rx'd steroid, resolved in 2 days   • Hyperlipidemia    • Hypertension        Past Surgical History:   Procedure Laterality Date   • COLONOSCOPY  2017   • VEIN LIGATION AND STRIPPING Bilateral        Family History   Problem Relation Age of Onset   • Diabetes Father    • Heart disease Father    • Diabetes Brother      I have reviewed and agree with the history as documented. E-Cigarette/Vaping   • E-Cigarette Use Never User      E-Cigarette/Vaping Substances   • Nicotine No    • THC No    • CBD No    • Flavoring No    • Other No    • Unknown No      Social History     Tobacco Use   • Smoking status: Every Day     Packs/day: 1.00     Years: 20.00     Total pack years: 20.00     Types: Cigarettes   • Smokeless tobacco: Never   • Tobacco comments:     trying to quit 9/17/20 using Chantix which was unsuccessful   Vaping Use   • Vaping Use: Never used   Substance Use Topics   • Alcohol use: No   • Drug use: No       Review of Systems   Constitutional: Negative for fever. Respiratory: Negative for shortness of breath.     Cardiovascular: Negative for chest pain. Gastrointestinal: Negative for abdominal pain. Genitourinary: Negative for flank pain. Musculoskeletal: Positive for arthralgias. Negative for gait problem. Skin: Negative for rash. Neurological: Negative for weakness and numbness. All other systems reviewed and are negative. Physical Exam  Physical Exam  Vitals and nursing note reviewed. Constitutional:       General: He is not in acute distress. Appearance: He is not ill-appearing. HENT:      Head: Normocephalic and atraumatic. Nose: Nose normal.      Mouth/Throat:      Mouth: Mucous membranes are moist.   Eyes:      Conjunctiva/sclera: Conjunctivae normal.   Cardiovascular:      Rate and Rhythm: Normal rate. Comments: 2+ right radial pulse. Pulmonary:      Effort: Pulmonary effort is normal.   Abdominal:      General: There is no distension. Musculoskeletal:         General: Normal range of motion. Cervical back: Normal range of motion and neck supple. Comments: Range of motion of the right elbow is intact. There is pain with flexion and extension at the elbow. There is focal tenderness along the ulnar aspect of the right antecubital region with a small amount of swelling. The remainder of the right upper extremity is nontender. Skin:     General: Skin is warm and dry. Findings: No rash. Comments: No ecchymosis or other lesions overlying the area of pain. Neurological:      General: No focal deficit present. Mental Status: He is alert and oriented to person, place, and time. Comments: Motor and sensation intact to the right upper extremity.    Psychiatric:         Behavior: Behavior normal.         Vital Signs  ED Triage Vitals   Temperature Pulse Respirations Blood Pressure SpO2   07/16/23 1617 07/16/23 1619 07/16/23 1617 07/16/23 1619 07/16/23 1623   98.3 °F (36.8 °C) 93 20 139/60 99 %      Temp Source Heart Rate Source Patient Position - Orthostatic VS BP Location FiO2 (%) 07/16/23 1617 07/16/23 1617 07/16/23 1617 07/16/23 1617 --   Oral Monitor Sitting Left arm       Pain Score       07/16/23 1617       6           Vitals:    07/16/23 1617 07/16/23 1619   BP:  139/60   Pulse:  93   Patient Position - Orthostatic VS: Sitting          Visual Acuity      ED Medications  Medications   acetaminophen (TYLENOL) tablet 975 mg (975 mg Oral Given 7/16/23 1636)       Diagnostic Studies  Results Reviewed     None                 XR elbow 3+ views RIGHT   ED Interpretation by Néstor Lin MD (07/16 1650)   No acute fracture or dislocation. Independently interpreted by me. Procedures  Procedures         ED Course                                             Medical Decision Making  13-year-old male presenting for evaluation of right arm pain. The extremity is neurovascularly intact. Differential diagnoses include but not limited to sprain/strain, contusion, ligament/tendon tear, muscle tear, fracture, dislocation. X-ray without any acute fracture or dislocation. Patient placed in Ace wrap for comfort. Treated with Tylenol. Advised continued use of Tylenol and Motrin every 6 hours as needed for pain and application of ice intermittently. Advised follow-up with orthopedics for persistent pain. Return precautions discussed. Right arm pain: acute illness or injury  Amount and/or Complexity of Data Reviewed  Radiology: ordered and independent interpretation performed. Risk  OTC drugs. Disposition  Final diagnoses:   Right arm pain     Time reflects when diagnosis was documented in both MDM as applicable and the Disposition within this note     Time User Action Codes Description Comment    7/16/2023  4:50 PM Vivian Joshi [M79.601] Right arm pain       ED Disposition     ED Disposition   Discharge    Condition   Stable    Date/Time   Sun Jul 16, 2023  4:50 PM    [address] [facility] discharge to home/self care.                Follow-up Information Follow up With Specialties Details Why Contact Info Additional Information    Iftikhar Irizarry MD Family Medicine In 2 days  [id number] [facility]  [phone]       [address] [facility] Schedule an appointment as soon as possible for a visit  If symptoms worsen, As needed [facility] [address] [facility] [address] [facility] [address] [facility] [place] ([place]), [address] [facility] [phone]          Discharge Medication List as of 7/16/2023  4:51 PM      CONTINUE these medications which have NOT CHANGED    Details   atorvastatin (LIPITOR) 20 mg tablet 1 tab daily, Normal      b complex vitamins capsule Take 1 capsule by mouth daily, Historical Med      Coenzyme Q10 10 MG capsule Take 10 mg by mouth daily, Historical Med      Continuous Blood Gluc Sensor (FreeStyle Yoly 14 Day Sensor) MISC CHANGE EVERY 14 DAYS, Normal      EPINEPHrine (EPIPEN) 0.3 mg/0.3 mL SOAJ EpiPen 2-Neeraj 0.3 mg/0.3 mL injection, auto-injector, Historical Med      lisinopril (ZESTRIL) 5 mg tablet Take 1 tablet (5 mg total) by mouth daily, Starting Wed 5/31/2023, Normal      multivitamin (THERAGRAN) TABS Take 1 tablet by mouth daily. , Historical Med      NovoLOG 100 UNIT/ML injection USE 60U VIA PUMP DAILY, Normal      !! omalizumab (XOLAIR) subcutaneous injection Inject 2 mL (300 mg total) under the skin every 8 weeks, Starting Tue 6/20/2023, Normal      Synjardy XR 12.5-1000 MG TB24 TAKE ONE TABLET (125-1000 MG) TWO TIMES A DAY WITH MEALS, Normal      benzonatate (TESSALON PERLES) 100 mg capsule Take 1 capsule (100 mg total) by mouth 3 (three) times a day as needed for cough, Starting Wed 3/8/2023, Normal      Diclofenac Sodium (VOLTAREN) 1 % Apply 2 g topically 4 (four) times a day, Starting Mon 3/20/2023, Normal      !! Omalizumab (XOLAIR SC) Inject under the skin every 30 (thirty) days , Historical Med      omeprazole (PriLOSEC) 40 MG capsule Take 1 capsule (40 mg total) by mouth daily, Starting Mon 3/20/2023, Normal      sildenafil (VIAGRA) 100 mg tablet Take 1 tablet (100 mg total) by mouth daily as needed for erectile dysfunction, Starting Mon 3/20/2023, Normal       !! - Potential duplicate medications found. Please discuss with provider. No discharge procedures on file.     PDMP Review     None          ED Provider  Electronically Signed by           Michel Beckman MD  07/16/23 0233

## 2023-07-26 ENCOUNTER — OFFICE VISIT (OUTPATIENT)
Dept: OBGYN CLINIC | Facility: CLINIC | Age: 53
End: 2023-07-26
Payer: COMMERCIAL

## 2023-07-26 ENCOUNTER — HOSPITAL ENCOUNTER (OUTPATIENT)
Dept: RADIOLOGY | Facility: HOSPITAL | Age: 53
Discharge: HOME | End: 2023-07-26
Attending: STUDENT IN AN ORGANIZED HEALTH CARE EDUCATION/TRAINING PROGRAM
Payer: COMMERCIAL

## 2023-07-26 VITALS — DIASTOLIC BLOOD PRESSURE: 73 MMHG | HEART RATE: 71 BPM | SYSTOLIC BLOOD PRESSURE: 119 MMHG

## 2023-07-26 DIAGNOSIS — S46.211A RUPTURE OF RIGHT DISTAL BICEPS TENDON, INITIAL ENCOUNTER: Primary | ICD-10-CM

## 2023-07-26 DIAGNOSIS — S46.211A RUPTURE OF RIGHT DISTAL BICEPS TENDON, INITIAL ENCOUNTER: ICD-10-CM

## 2023-07-26 PROCEDURE — 99204 OFFICE O/P NEW MOD 45 MIN: CPT | Performed by: STUDENT IN AN ORGANIZED HEALTH CARE EDUCATION/TRAINING PROGRAM

## 2023-07-26 PROCEDURE — G1004 CDSM NDSC: HCPCS

## 2023-07-26 PROCEDURE — 73221 MRI JOINT UPR EXTREM W/O DYE: CPT
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
## 2023-08-02 ENCOUNTER — OFFICE VISIT (OUTPATIENT)
Dept: OBGYN CLINIC | Facility: CLINIC | Age: 53
End: 2023-08-02
Payer: COMMERCIAL

## 2023-08-02 ENCOUNTER — ANESTHESIA EVENT (OUTPATIENT)
Dept: PERIOP | Facility: HOSPITAL | Age: 53
End: 2023-08-02
Payer: COMMERCIAL

## 2023-08-02 VITALS
WEIGHT: 215 LBS | HEART RATE: 87 BPM | SYSTOLIC BLOOD PRESSURE: 120 MMHG | HEIGHT: 69 IN | DIASTOLIC BLOOD PRESSURE: 70 MMHG | BODY MASS INDEX: 31.84 KG/M2

## 2023-08-02 DIAGNOSIS — S46.211A RUPTURE OF RIGHT DISTAL BICEPS TENDON, INITIAL ENCOUNTER: Primary | ICD-10-CM

## 2023-08-02 PROCEDURE — NC001 PR NO CHARGE: Performed by: STUDENT IN AN ORGANIZED HEALTH CARE EDUCATION/TRAINING PROGRAM

## 2023-08-02 PROCEDURE — 99214 OFFICE O/P EST MOD 30 MIN: CPT | Performed by: STUDENT IN AN ORGANIZED HEALTH CARE EDUCATION/TRAINING PROGRAM

## 2023-08-02 RX ORDER — CHLORHEXIDINE GLUCONATE 0.12 MG/ML
15 RINSE ORAL ONCE
Status: CANCELLED | OUTPATIENT
Start: 2023-08-02 | End: 2023-08-02

## 2023-08-02 RX ORDER — CEFAZOLIN SODIUM 2 G/50ML
2000 SOLUTION INTRAVENOUS ONCE
Status: CANCELLED | OUTPATIENT
Start: 2023-08-03 | End: 2023-08-02

## 2023-08-02 NOTE — PRE-PROCEDURE INSTRUCTIONS
Pre-Surgery Instructions:   Medication Instructions   • atorvastatin (LIPITOR) 20 mg tablet Take night before surgery   • b complex vitamins capsule Stop taking 1 day prior to surgery. • Coenzyme Q10 10 MG capsule Stop taking 1 day prior to surgery. • EPINEPHrine (EPIPEN) 0.3 mg/0.3 mL SOAJ Uses PRN- OK to take day of surgery   • lisinopril (ZESTRIL) 5 mg tablet Take night before surgery   • multivitamin (THERAGRAN) TABS Stop taking 1 day prior to surgery. • NovoLOG 100 UNIT/ML injection will set pump to basal rate    • Synjardy XR 12.5-1000 MG TB24 Hold day of surgery. Spoke with pt's spouse, Dayron Parikh, for pre op phone call/instruction review and with pt's permission. Medication instructions for day surgery reviewed. Please use only a sip of water to take your instructed medications. Avoid all over the counter vitamins, supplements and NSAIDS for one week prior to surgery per anesthesia guidelines. Tylenol is ok to take as needed. You will receive a call one business day prior to surgery with an arrival time and hospital directions. If your surgery is scheduled on a Monday, the hospital will be calling you on the Friday prior to your surgery. If you have not heard from anyone by 8pm, please call the hospital supervisor through the hospital  at [phone]. Jerry Ferreira [phone]). Do not eat or drink anything after midnight the night before your surgery, including candy, mints, lifesavers, or chewing gum. Do not drink alcohol 24hrs before your surgery. Try not to smoke at least 24hrs before your surgery. Follow the pre surgery showering instructions as listed in the [facility] Surgical Experience Booklet” or otherwise provided by your surgeon's office. Do not shave the surgical area 24 hours before surgery. Do not apply any lotions, creams, including makeup, cologne, deodorant, or perfumes after showering on the day of your surgery.      No contact lenses, eye make-up, or artificial eyelashes. Remove nail polish, including gel polish, and any artificial, gel, or acrylic nails if possible. Remove all jewelry including rings and body piercing jewelry. Wear causal clothing that is easy to take on and off. Consider your type of surgery. Keep any valuables, jewelry, piercings at home. Please bring any specially ordered equipment (sling, braces) if indicated. Arrange for a responsible person to drive you to and from the hospital on the day of your surgery. Visitor Guidelines discussed. Call the surgeon's office with any new illnesses, exposures, or additional questions prior to surgery. Please reference your [facility] Surgical Experience Booklet” for additional information to prepare for your upcoming surgery.
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
## 2023-08-02 NOTE — ANESTHESIA PREPROCEDURE EVALUATION
Procedure:  REPAIR TENDON BICEPS (Right: Arm Upper)    Relevant Problems   ANESTHESIA   (+) PONV (postoperative nausea and vomiting)      CARDIO   (+) Primary hypertension   (+) Pure hypercholesterolemia      MUSCULOSKELETAL   (+) Mid back pain      PULMONARY   (+) Smoker      Endocrine   (+) Diabetes mellitus (HCC)      Musculoskeletal and Integument   (+) Chronic urticaria      Other   (+) Class 1 obesity with body mass index (BMI) of 30.0 to 30.9 in adult        Physical Exam    Airway    Mallampati score: III  TM Distance: >3 FB  Neck ROM: full     Dental   No notable dental hx     Cardiovascular      Pulmonary      Other Findings        Anesthesia Plan  ASA Score- 2     Anesthesia Type- general with ASA Monitors. Additional Monitors:   Airway Plan: ETT. Comment: supraclav block with exparel for post op pain management . Plan Factors-    Chart reviewed. EKG reviewed. Existing labs reviewed. Patient summary reviewed. Patient is a current smoker. Induction- intravenous. Postoperative Plan- Plan for postoperative opioid use. Informed Consent- Anesthetic plan and risks discussed with patient. I personally reviewed this patient with the CRNA. Discussed and agreed on the Anesthesia Plan with the CRNA. Jakub Santana

## 2023-08-03 ENCOUNTER — ANESTHESIA (OUTPATIENT)
Dept: PERIOP | Facility: HOSPITAL | Age: 53
End: 2023-08-03
Payer: COMMERCIAL

## 2023-08-03 ENCOUNTER — HOSPITAL ENCOUNTER (OUTPATIENT)
Facility: HOSPITAL | Age: 53
Setting detail: OUTPATIENT SURGERY
Discharge: HOME | End: 2023-08-03
Attending: STUDENT IN AN ORGANIZED HEALTH CARE EDUCATION/TRAINING PROGRAM | Admitting: STUDENT IN AN ORGANIZED HEALTH CARE EDUCATION/TRAINING PROGRAM
Payer: COMMERCIAL

## 2023-08-03 ENCOUNTER — APPOINTMENT (OUTPATIENT)
Dept: RADIOLOGY | Facility: HOSPITAL | Age: 53
End: 2023-08-03
Payer: COMMERCIAL

## 2023-08-03 VITALS
RESPIRATION RATE: 25 BRPM | HEART RATE: 78 BPM | TEMPERATURE: 97.5 F | DIASTOLIC BLOOD PRESSURE: 73 MMHG | SYSTOLIC BLOOD PRESSURE: 127 MMHG | BODY MASS INDEX: 30.66 KG/M2 | WEIGHT: 207 LBS | OXYGEN SATURATION: 93 % | HEIGHT: 69 IN

## 2023-08-03 DIAGNOSIS — S46.211A RUPTURE OF RIGHT DISTAL BICEPS TENDON, INITIAL ENCOUNTER: Primary | ICD-10-CM

## 2023-08-03 DIAGNOSIS — S46.211D RUPTURE OF RIGHT DISTAL BICEPS TENDON, SUBSEQUENT ENCOUNTER: Primary | ICD-10-CM

## 2023-08-03 LAB — GLUCOSE SERPL-MCNC: 163 MG/DL (ref 65–140)

## 2023-08-03 PROCEDURE — 24342 REPAIR OF RUPTURED TENDON: CPT | Performed by: STUDENT IN AN ORGANIZED HEALTH CARE EDUCATION/TRAINING PROGRAM

## 2023-08-03 PROCEDURE — 73070 X-RAY EXAM OF ELBOW: CPT

## 2023-08-03 PROCEDURE — C9290 INJ, BUPIVACAINE LIPOSOME: HCPCS | Performed by: ANESTHESIOLOGY

## 2023-08-03 PROCEDURE — 82948 REAGENT STRIP/BLOOD GLUCOSE: CPT

## 2023-08-03 PROCEDURE — C1713 ANCHOR/SCREW BN/BN,TIS/BN: HCPCS | Performed by: STUDENT IN AN ORGANIZED HEALTH CARE EDUCATION/TRAINING PROGRAM

## 2023-08-03 DEVICE — IMPL DELIVERY SYS,DISTAL BICEPS REPR
Type: IMPLANTABLE DEVICE | Status: FUNCTIONAL
Brand: ARTHREX®

## 2023-08-03 RX ORDER — OXYCODONE HYDROCHLORIDE 5 MG/1
5 TABLET ORAL EVERY 4 HOURS PRN
Qty: 25 TABLET | Refills: 0 | Status: SHIPPED | OUTPATIENT
Start: 2023-08-03 | End: 2023-08-08

## 2023-08-03 RX ORDER — BUPIVACAINE HYDROCHLORIDE 5 MG/ML
INJECTION, SOLUTION EPIDURAL; INTRACAUDAL
Status: COMPLETED | OUTPATIENT
Start: 2023-08-03 | End: 2023-08-03

## 2023-08-03 RX ORDER — PROPOFOL 10 MG/ML
INJECTION, EMULSION INTRAVENOUS AS NEEDED
Status: DISCONTINUED | OUTPATIENT
Start: 2023-08-03 | End: 2023-08-03

## 2023-08-03 RX ORDER — SCOLOPAMINE TRANSDERMAL SYSTEM 1 MG/1
1 PATCH, EXTENDED RELEASE TRANSDERMAL
Status: DISCONTINUED | OUTPATIENT
Start: 2023-08-03 | End: 2023-08-03 | Stop reason: HOSPADM

## 2023-08-03 RX ORDER — TRAMADOL HYDROCHLORIDE 50 MG/1
100 TABLET ORAL EVERY 6 HOURS PRN
Status: DISCONTINUED | OUTPATIENT
Start: 2023-08-03 | End: 2023-08-03 | Stop reason: HOSPADM

## 2023-08-03 RX ORDER — SODIUM CHLORIDE, SODIUM LACTATE, POTASSIUM CHLORIDE, CALCIUM CHLORIDE 600; 310; 30; 20 MG/100ML; MG/100ML; MG/100ML; MG/100ML
INJECTION, SOLUTION INTRAVENOUS CONTINUOUS PRN
Status: DISCONTINUED | OUTPATIENT
Start: 2023-08-03 | End: 2023-08-03

## 2023-08-03 RX ORDER — HYDROMORPHONE HYDROCHLORIDE 2 MG/ML
INJECTION, SOLUTION INTRAMUSCULAR; INTRAVENOUS; SUBCUTANEOUS AS NEEDED
Status: DISCONTINUED | OUTPATIENT
Start: 2023-08-03 | End: 2023-08-03

## 2023-08-03 RX ORDER — PROPOFOL 10 MG/ML
INJECTION, EMULSION INTRAVENOUS CONTINUOUS PRN
Status: DISCONTINUED | OUTPATIENT
Start: 2023-08-03 | End: 2023-08-03

## 2023-08-03 RX ORDER — LIDOCAINE HYDROCHLORIDE 10 MG/ML
INJECTION, SOLUTION EPIDURAL; INFILTRATION; INTRACAUDAL; PERINEURAL
Status: COMPLETED | OUTPATIENT
Start: 2023-08-03 | End: 2023-08-03

## 2023-08-03 RX ORDER — ONDANSETRON 2 MG/ML
INJECTION INTRAMUSCULAR; INTRAVENOUS AS NEEDED
Status: DISCONTINUED | OUTPATIENT
Start: 2023-08-03 | End: 2023-08-03

## 2023-08-03 RX ORDER — FENTANYL CITRATE 50 UG/ML
INJECTION, SOLUTION INTRAMUSCULAR; INTRAVENOUS
Status: COMPLETED | OUTPATIENT
Start: 2023-08-03 | End: 2023-08-03

## 2023-08-03 RX ORDER — ACETAMINOPHEN 325 MG/1
650 TABLET ORAL EVERY 6 HOURS PRN
Status: DISCONTINUED | OUTPATIENT
Start: 2023-08-03 | End: 2023-08-03 | Stop reason: HOSPADM

## 2023-08-03 RX ORDER — EPHEDRINE SULFATE 50 MG/ML
INJECTION INTRAVENOUS AS NEEDED
Status: DISCONTINUED | OUTPATIENT
Start: 2023-08-03 | End: 2023-08-03

## 2023-08-03 RX ORDER — ONDANSETRON 2 MG/ML
4 INJECTION INTRAMUSCULAR; INTRAVENOUS EVERY 6 HOURS PRN
Status: DISCONTINUED | OUTPATIENT
Start: 2023-08-03 | End: 2023-08-03 | Stop reason: HOSPADM

## 2023-08-03 RX ORDER — FENTANYL CITRATE/PF 50 MCG/ML
25 SYRINGE (ML) INJECTION
Status: DISCONTINUED | OUTPATIENT
Start: 2023-08-03 | End: 2023-08-03 | Stop reason: HOSPADM

## 2023-08-03 RX ORDER — DEXAMETHASONE SODIUM PHOSPHATE 10 MG/ML
INJECTION, SOLUTION INTRAMUSCULAR; INTRAVENOUS AS NEEDED
Status: DISCONTINUED | OUTPATIENT
Start: 2023-08-03 | End: 2023-08-03

## 2023-08-03 RX ORDER — CEPHALEXIN 500 MG/1
500 CAPSULE ORAL EVERY 8 HOURS SCHEDULED
Qty: 9 CAPSULE | Refills: 0 | Status: SHIPPED | OUTPATIENT
Start: 2023-08-03 | End: 2023-08-06

## 2023-08-03 RX ORDER — ASPIRIN 81 MG/1
81 TABLET, CHEWABLE ORAL 2 TIMES DAILY
Qty: 60 TABLET | Refills: 0 | Status: SHIPPED | OUTPATIENT
Start: 2023-08-03

## 2023-08-03 RX ORDER — LIDOCAINE HYDROCHLORIDE 10 MG/ML
INJECTION, SOLUTION EPIDURAL; INFILTRATION; INTRACAUDAL; PERINEURAL AS NEEDED
Status: DISCONTINUED | OUTPATIENT
Start: 2023-08-03 | End: 2023-08-03

## 2023-08-03 RX ORDER — CHLORHEXIDINE GLUCONATE 0.12 MG/ML
15 RINSE ORAL ONCE
Status: DISCONTINUED | OUTPATIENT
Start: 2023-08-03 | End: 2023-08-03 | Stop reason: HOSPADM

## 2023-08-03 RX ORDER — GLYCOPYRROLATE 0.2 MG/ML
INJECTION INTRAMUSCULAR; INTRAVENOUS AS NEEDED
Status: DISCONTINUED | OUTPATIENT
Start: 2023-08-03 | End: 2023-08-03

## 2023-08-03 RX ORDER — SODIUM CHLORIDE, SODIUM LACTATE, POTASSIUM CHLORIDE, CALCIUM CHLORIDE 600; 310; 30; 20 MG/100ML; MG/100ML; MG/100ML; MG/100ML
100 INJECTION, SOLUTION INTRAVENOUS CONTINUOUS
Status: DISCONTINUED | OUTPATIENT
Start: 2023-08-03 | End: 2023-08-03 | Stop reason: HOSPADM

## 2023-08-03 RX ORDER — ONDANSETRON 2 MG/ML
4 INJECTION INTRAMUSCULAR; INTRAVENOUS ONCE
Status: COMPLETED | OUTPATIENT
Start: 2023-08-03 | End: 2023-08-03

## 2023-08-03 RX ORDER — NEOSTIGMINE METHYLSULFATE 1 MG/ML
INJECTION INTRAVENOUS AS NEEDED
Status: DISCONTINUED | OUTPATIENT
Start: 2023-08-03 | End: 2023-08-03

## 2023-08-03 RX ORDER — ROCURONIUM BROMIDE 10 MG/ML
INJECTION, SOLUTION INTRAVENOUS AS NEEDED
Status: DISCONTINUED | OUTPATIENT
Start: 2023-08-03 | End: 2023-08-03

## 2023-08-03 RX ORDER — MIDAZOLAM HYDROCHLORIDE 2 MG/2ML
INJECTION, SOLUTION INTRAMUSCULAR; INTRAVENOUS
Status: COMPLETED | OUTPATIENT
Start: 2023-08-03 | End: 2023-08-03

## 2023-08-03 RX ORDER — CEFAZOLIN SODIUM 2 G/50ML
2000 SOLUTION INTRAVENOUS ONCE
Status: COMPLETED | OUTPATIENT
Start: 2023-08-03 | End: 2023-08-03

## 2023-08-03 RX ADMIN — ROCURONIUM BROMIDE 10 MG: 10 INJECTION, SOLUTION INTRAVENOUS at 08:35

## 2023-08-03 RX ADMIN — HYDROMORPHONE HYDROCHLORIDE 0.25 MG: 2 INJECTION, SOLUTION INTRAMUSCULAR; INTRAVENOUS; SUBCUTANEOUS at 08:51

## 2023-08-03 RX ADMIN — SODIUM CHLORIDE, SODIUM LACTATE, POTASSIUM CHLORIDE, AND CALCIUM CHLORIDE: .6; .31; .03; .02 INJECTION, SOLUTION INTRAVENOUS at 07:38

## 2023-08-03 RX ADMIN — PROPOFOL 200 MG: 10 INJECTION, EMULSION INTRAVENOUS at 07:40

## 2023-08-03 RX ADMIN — SODIUM CHLORIDE, SODIUM LACTATE, POTASSIUM CHLORIDE, AND CALCIUM CHLORIDE: .6; .31; .03; .02 INJECTION, SOLUTION INTRAVENOUS at 08:55

## 2023-08-03 RX ADMIN — LIDOCAINE HYDROCHLORIDE 50 MG: 10 INJECTION, SOLUTION EPIDURAL; INFILTRATION; INTRACAUDAL; PERINEURAL at 07:40

## 2023-08-03 RX ADMIN — BUPIVACAINE 20 ML: 13.3 INJECTION, SUSPENSION, LIPOSOMAL INFILTRATION at 07:00

## 2023-08-03 RX ADMIN — PROPOFOL 30 MCG/KG/MIN: 10 INJECTION, EMULSION INTRAVENOUS at 07:38

## 2023-08-03 RX ADMIN — EPHEDRINE SULFATE 5 MG: 50 INJECTION INTRAVENOUS at 08:14

## 2023-08-03 RX ADMIN — GLYCOPYRROLATE 0.4 MG: 0.2 INJECTION INTRAMUSCULAR; INTRAVENOUS at 09:01

## 2023-08-03 RX ADMIN — HYDROMORPHONE HYDROCHLORIDE 0.25 MG: 2 INJECTION, SOLUTION INTRAMUSCULAR; INTRAVENOUS; SUBCUTANEOUS at 08:44

## 2023-08-03 RX ADMIN — ROCURONIUM BROMIDE 10 MG: 10 INJECTION, SOLUTION INTRAVENOUS at 08:20

## 2023-08-03 RX ADMIN — BUPIVACAINE HYDROCHLORIDE 7 ML: 5 INJECTION, SOLUTION EPIDURAL; INTRACAUDAL; PERINEURAL at 07:00

## 2023-08-03 RX ADMIN — NEOSTIGMINE METHYLSULFATE 3 MG: 1 INJECTION INTRAVENOUS at 09:01

## 2023-08-03 RX ADMIN — CEFAZOLIN SODIUM 2000 MG: 2 SOLUTION INTRAVENOUS at 08:00

## 2023-08-03 RX ADMIN — ONDANSETRON HYDROCHLORIDE 4 MG: 2 INJECTION, SOLUTION INTRAMUSCULAR; INTRAVENOUS at 07:38

## 2023-08-03 RX ADMIN — MIDAZOLAM 2 MG: 1 INJECTION INTRAMUSCULAR; INTRAVENOUS at 07:00

## 2023-08-03 RX ADMIN — LIDOCAINE HYDROCHLORIDE 3 ML: 10 INJECTION, SOLUTION EPIDURAL; INFILTRATION; INTRACAUDAL; PERINEURAL at 07:00

## 2023-08-03 RX ADMIN — FENTANYL CITRATE 100 MCG: 50 INJECTION, SOLUTION INTRAMUSCULAR; INTRAVENOUS at 07:00

## 2023-08-03 RX ADMIN — ONDANSETRON 4 MG: 2 INJECTION INTRAMUSCULAR; INTRAVENOUS at 09:49

## 2023-08-03 RX ADMIN — SCOPALAMINE 1 PATCH: 1 PATCH, EXTENDED RELEASE TRANSDERMAL at 06:40

## 2023-08-03 RX ADMIN — DEXAMETHASONE SODIUM PHOSPHATE 10 MG: 10 INJECTION, SOLUTION INTRAMUSCULAR; INTRAVENOUS at 07:38

## 2023-08-03 RX ADMIN — GLYCOPYRROLATE 0.2 MG: 0.2 INJECTION INTRAMUSCULAR; INTRAVENOUS at 07:38

## 2023-08-03 RX ADMIN — PHENYLEPHRINE HYDROCHLORIDE 40 MCG/MIN: 10 INJECTION, SOLUTION INTRAVENOUS at 08:05

## 2023-08-03 RX ADMIN — ROCURONIUM BROMIDE 50 MG: 10 INJECTION, SOLUTION INTRAVENOUS at 07:38

## 2023-08-03 NOTE — ANESTHESIA POSTPROCEDURE EVALUATION
Post-Op Assessment Note    CV Status:  Stable  Pain Score: 0    Pain management: adequate  Multimodal analgesia used between 6 hours prior to anesthesia start to PACU discharge    Mental Status:  Alert and awake   Hydration Status:  Euvolemic and stable   PONV Controlled:  Controlled   Airway Patency:  Patent   Two or more mitigation strategies used for obstructive sleep apnea   Post Op Vitals Reviewed: Yes      Staff: CRNA         No notable events documented.     BP   135/86   Temp   97.6   Pulse  76   Resp  12   SpO2   98

## 2023-08-03 NOTE — DISCHARGE INSTR - AVS FIRST PAGE
DR. Thomas Cornejo DISTAL BICEPS REPAIR   POST OPERATIVE INSTRUCTIONS  EXPECTATIONS  It is normal to have some discomfort in your arm for several days after surgery. For the next 48 to 72 hours use ice bags or gel packs around the arm to help reduce the pain and swelling. Place a pillow underneath your elbow when sitting to help reduce pressure and discomfort. WEIGHT BEARING AND WOUND CARE  No weight bearing to your operative arm, wear your sling at all times. We encourage finger and wrist range of motion. Do not drive until instructed by your physician. Do not remove your splint at any time. The splint is made of plaster and cannot get wet or it will fall apart. Cover with a trash bag or commercially available splint/cast bag for showers and try to minimize the amount of direct contact with water. If the splint gets wet or soaked you need to call the office immediately. MEDICATIONS  You may resume your usual home medications unless instructed otherwise. You have been prescribed several medications. Take as directed on the instructions. If you experience any adverse effects, stop taking them immediately and call the office for additional instructions. Tylenol: 1000 mg every 8 hours for mild/moderate pain  Ibuprofen: 600mg every 8 hours for mild/moderate pain. You can take this together with the Tylenol, they do not interact. Aspirin 81 mg twice daily for 30 days. This is to prevent blood clots after surgery. Narcotic pain medication: You may have been prescribed a strong narcotic medication. Take this according to the pharmacy instructions. Nerve Block: If you received a nerve block your surgical limb may feel numb with loss of muscle control for up to 18-24 hours after surgery. We recommend taking a dose of pain medication prior to bed tonight, to cover any pain that may occur if your nerve block begins to wear off while you are sleeping.   FOLLOW UP  The findings of your surgery will be explained to you and your family immediately after surgery. However, in the post-operative period, during recovery from anesthesia you may not fully remember or fully understand what was said. This will be explained once again when you return for your post-op appointment. You should call to schedule a post-operative appointment in the office 10-14 days from the date of surgery. If you experience fever over 101 degrees, excessive bleeding, persistent nausea or vomiting, decreased sensation or discoloration of the operative arm, or severe uncontrollable pain please contact Dr. Taqueria Guaman office immediately.      Dr. Arianna Constantino  [address] [id number] [facility], [facility]    [facility]  [address] [facility] [address]

## 2023-08-03 NOTE — PROGRESS NOTES
PT Referral s/p Right distal biceps repair    Distal Biceps Repair Rehab Guidelines        PHASE 1: MAXIMUM PROTECTION PHASE - WEEKS 0-2    GENERAL GUIDELINES AND PRECAUTIONS    • Sling and splint worn at [address]   • No weight-bearing through the operative arm  • Cover with trashbag for showers or sponge-bathe  • Must keep splint clean and dry    GOALS:  • Patient education about the nature of the surgery, associated precautions and expected rehab progression  • Protect repair and create environment for optimal healing  • Control pain, swelling and inflammation  DIRECTIONS:  • Wrist and hand ROM to avoid stiffness  • Gripping exercises, shoulder pendulums in splint  • Posterior splint locked at 90 degrees of flexion for 2 weeks  • Wound check at 2 weeks.    PHASE 2: WEEKS 3-6   GENERAL GUIDELINES AND PRECAUTIONS    • Remove sling and splint  • Hinged elbow brace applied  • Begin passive and active assisted supination  o Week 3: Passive elbow pronation/supination with hinged brace at 90  o Week 4: Active-assisted elbow pronation/supination with hinged brace at 90  o Week 5: Begin active elbow pronation/supination with hinged brace at 90, with forearm supported  o Week 6: Begin active elbow pronation/supination with hinged brace at 90, without forearm supported  • Progress elbow flexion  o Week 3:  degrees of flexion  o Week 4:  degrees of flexion  o Week 6: 15 - 130 degrees of flexion  • Continue shoulder PROM exercises  • Scapular strengthening  • Wrist extensors/flexors  • Gripping exercises  • Week 5-6: Isometric triceps exercises  GOALS  • Achieve AROM and PROM to limits established above  • Initiate and progress motion without overstressing healing tissue  • Minimize pain and inflammation (may ice after exercise)                                                                                                                   PHASE 3: WEEKS 6-10  GENERAL GUIDELINES AND PRECAUTIONS    • Hinged elbow brace continued until  0 to 145 degrees of flexion  • Discontinue hinged elbow brace by week 8  • If ROM deficits are present at week 8, please contact Dr. Willam Galeana to discuss expediting rehab protocol to more aggressive ROM   • No weight bearing until week 8  o Begin gradual weight bearing with elbow flexed at week 8  o Progress to gradual weight bearing with elbow extended at week 10  • No lifting object with surgically repaired extremity until week 8  • No strengthening biceps until week 10-12  • Weight bearing progression:   o Wall push ups  o Push ups on elevated table  • Week 8 begin the following  o Isotonic triceps  o Isotonic wrist extensor/flexor  o Shoulder isotonic  • Continued use of ice as needed    CRITERIA TO PROGRESS TO PHASE 4  • Full PROM, AROM with normalized mechanics, and pain less than 2/10 with exercise and ADL's    PHASE 4: WEEKS 10-16  GENERAL GUIDELINES AND PRECAUTIONS    • Initiate biceps strengthening exercises slowly  • Biceps isometric begin week 10  o Submaximal isometrics of elbow flexors, extensors, supinators, pronators. o At week 12, progress to submaximal isotonics.   o Resisted biceps curl (neutral, pronated, supinated)  o Resisted pronation and supination  o Resisted tricep extension  • Continue flexibility exercises  • Continue ROM/stretching exercises      PHASE 5: WEEKS 16-24  GENERAL GUIDELINES AND PRECAUTIONS  • Biceps isotonics (light) week 10-12  • Plyometrics  • Single arm plyometrics, bilateral upper extremity plyometrics  • Begin LIGHT upper extremity weight training at 12 weeks  • Progress to MODERATE upper extremity weight training at 14-16 weeks  • Unrestricted weight training at 16-20 weeks  • Return to sport specific activities  • Early return to sport: 4-6 months   • Unrestricted return to sport: 6 months    CRITERIA TO RETURN TO WORK/SPORT  • Clearance from physician  • Pain free at rest and minimal pain with the work or sport specific activity simulation  • Sufficient ROM and strength with normalized mechanics for the activity

## 2023-08-03 NOTE — ANESTHESIA PROCEDURE NOTES
Peripheral Block    Start time: 8/3/2023 7:00 AM  Reason for block: at surgeon's request and post-op pain management  Staffing  Performed by: Carmen Paez DO  Authorized by: Carmen Paez DO    Preanesthetic Checklist  Completed: patient identified, IV checked, site marked, risks and benefits discussed, surgical consent, monitors and equipment checked, pre-op evaluation and timeout performed  Peripheral Block  Patient position: sitting  Prep: ChloraPrep  Patient monitoring: heart rate, continuous pulse ox and frequent blood pressure checks  Block type: supraclavicular  Laterality: right  Injection technique: single-shot  Procedures: ultrasound guided, Ultrasound guidance required for the procedure to increase accuracy and safety of medication placement and decrease risk of complications.   Ultrasound permanent image savedbupivacaine (PF) (MARCAINE) injection 0.5 % - Perineural   7 mL - 8/3/2023 7:00:00 AM  midazolam (VERSED) 2 mg/2 mL - Intravenous   2 mg - 8/3/2023 7:00:00 AM  fentaNYL 50 mcg/mL - Intravenous   100 mcg - 8/3/2023 7:00:00 AM  lidocaine (PF) (XYLOCAINE-MPF) 1 % - Infiltration   3 mL - 8/3/2023 7:00:00 AM  Needle  Needle type: Stimuplex   Needle gauge: 22 G  Needle length: 4 in  Needle localization: ultrasound guidance  Assessment  Injection assessment: incremental injection, local visualized surrounding nerve on ultrasound, negative aspiration for heme and no paresthesia on injection  Paresthesia pain: none  Heart rate change: no  Slow fractionated injection: yes  Post-procedure:  site cleaned  patient tolerated the procedure well with no immediate complications

## 2023-08-03 NOTE — OP NOTE
OPERATIVE REPORT  PATIENT NAME: Kody Freeman    :  1970  MRN: [id number]  Pt Location: [facility] OR ROOM 02    SURGERY DATE: 8/3/2023    Surgeon(s) and Role:     * Michelle Martin, DO - Primary     * Shaylee Forbes. MS, ATC - Assisting    Preop Diagnosis:  Rupture of right distal biceps tendon, initial encounter [S46.211A]    Post-Op Diagnosis Codes:     * Rupture of right distal biceps tendon, initial encounter [A22.781I]    Procedure(s):  Right - REPAIR TENDON BICEPS    Specimen(s):  * No specimens in log *    Estimated Blood Loss:   Minimal    Drains:  * No LDAs found *    Anesthesia Type:   General w/ Regional    Operative Indications:  Rupture of right distal biceps tendon, initial encounter [Q85.529H]  The patient is a very active 47year-old  who comes in to me with significant problems associated with right elbow. After failure of conservative nonoperative care, eventually came and saw me, on physical examination with x-ray and radiographic findings, found to have a distal biceps rupture. The risks and benefits of surgical intervention were explained including, but not exclusive to, infection, DVT, loss of range of motion, stiffness, continuance of pain, failure, fracture, dislocation, delayed union, malunion, nonunion, wound complications, and neurovascular compromise. Operative Findings:  Complete distal biceps rupture with intact lacertus fibrosis    Complications:   None    Procedure and Technique:  INDICATIONS:  The patient presented to the office with an injury sustained to the right arm. They were performing an active maneuver when they felt a pop and pain in the antecubital fossa. The noticed proximal displacement of the biceps and onset of ecchymosis and edema in the antecubital fossa. Clinical exam consistent with distal biceps rupture, confirmed on MRI.  While in office, as well as, prior to signing the consent to we discussed risk and benefits to include, but not exclusive of infection, wound complications, loss of range of motion, stiffness, continuance of pain and numbness, recurrent tear, neurovascular compromise, bruising and complications to anesthesia. We talked about failure rates. The patient consented for surgery. DESCRIPTION OF PROCEDURE:   The patient was met in the pre-operative holding area and procedure was again discussed including risks and benefits, as well as post-operative convalescence. Site and side were verified and marked by the patient and myself. The patient's identity was confirmed using the hospital wristband and chart. The patient was brought back to the operative suite and transferred to the OR bed. General anesthesia was induced by the anesthesia team. All bony prominences were padded. The patient was placed supine and a hand table was utilized. The arm was prepped and draped in the standard fashion. A formal timeout was performed. An eschmarch was used to exsanguinate the limb and tourniquet placed up to 250mmHg. A longitudinal incision was marked distal to the antecubital flexion crease and confirmed on fluoroscopy overlying the radial tuberosity. Skin incision made with a 15 blade. Blunt and sharp dissection carried down to the forearm fascia. Lateral antebrachial cutaneous nerve was identified and protected. Blunt dissection was used and a hematoma/seroma was encountered ultimately leading to the retracted distal biceps tendon. The lacertus fibrosis was intact and demonstrated excellent anatomy. The end of the tendon was debrided of macerated tissue. The distal biceps tendon was whipstitched with the last throw placed in a locked fashion. The tendon was then sized to fit through a 8mm tunnel. The distal biceps was placed back into the wound and retracted. Attention then turned to the radial tuberosity. The arm was placed in full supination. Blunt dissection carried down through the interval to the radial tuberosity.  Crossing vessels were atraumatically mobilized and a few crossing veins were cauterized. The remaining stump of the distal biceps was identified on the radial tuberosity and removed with a rongeur. Position on the radial tuberosity confirmed on fluoroscopy. A spade tip pin was drilled bicortically, position again confirmed on fluoroscopy to be center/center within the footprint of the biceps tendon. A unicortical tunnel was then drilled with a cannulated 8mm reamer. The wound was then copiously irrigated of all bony debris to reduce the risk of heterotopic ossification. The tunnel edges were debrided of remnant soft tissue. The arthrex biceps button was loaded with the distal biceps sutures and was delivered into the tunnel and confirmed flipped on the contralateral side of the radius. The elbow was slightly flexed and a tension slide technique used to deliver the tendon into the drilled tunnel. Excellent tension restored. One of the suture limbs was placed back through the tendon and the suture tails were tied together with alternating half hitches. Suture tails cut. A biotenodesis screw was then placed for augmented fixation on the radial side of the tendon, pushing the biceps tendon to the ulnar side of the tunnel. Final fluoroscopic images were obtained and saved. The tourniquet was released for a total tourniquet time of 44 minutes. The wound was copiously irrigated and hemostasis achieved with electrocautery. The subcutaneous tissues reapproximated with interrupted buried 2-0 monocryl and skin closed with a running 3-0 monocryl suture. Dermabond and tegaderm placed. A well-padded posterior splint applied. There was excellent capillary refill in all digits. Patient was transferred from the OR to the PACU in stable condition. I was present for the entire procedure.     Patient Disposition:  PACU       SIGNATURE: Remedios EdwardsDO  DATE: August 3, 2023  TIME: 9:18 AM
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
## 2023-08-04 LAB — GLUCOSE SERPL-MCNC: 151 MG/DL (ref 65–140)

## 2023-08-16 ENCOUNTER — OFFICE VISIT (OUTPATIENT)
Dept: OBGYN CLINIC | Facility: CLINIC | Age: 53
End: 2023-08-16

## 2023-08-16 VITALS — BODY MASS INDEX: 30.66 KG/M2 | WEIGHT: 207 LBS | HEIGHT: 69 IN

## 2023-08-16 DIAGNOSIS — S46.211D RUPTURE OF RIGHT DISTAL BICEPS TENDON, SUBSEQUENT ENCOUNTER: Primary | ICD-10-CM

## 2023-08-16 PROBLEM — S46.211A RUPTURE OF RIGHT DISTAL BICEPS TENDON: Status: ACTIVE | Noted: 2023-08-16

## 2023-08-16 PROCEDURE — 99024 POSTOP FOLLOW-UP VISIT: CPT | Performed by: STUDENT IN AN ORGANIZED HEALTH CARE EDUCATION/TRAINING PROGRAM
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
## 2023-08-25 ENCOUNTER — OFFICE VISIT (OUTPATIENT)
Dept: PHYSICAL THERAPY | Facility: CLINIC | Age: 53
End: 2023-08-25
Payer: COMMERCIAL

## 2023-08-25 ENCOUNTER — OFFICE VISIT (OUTPATIENT)
Dept: OBGYN CLINIC | Facility: CLINIC | Age: 53
End: 2023-08-25

## 2023-08-25 VITALS
RESPIRATION RATE: 17 BRPM | HEIGHT: 69 IN | SYSTOLIC BLOOD PRESSURE: 114 MMHG | HEART RATE: 87 BPM | DIASTOLIC BLOOD PRESSURE: 71 MMHG | BODY MASS INDEX: 30.66 KG/M2 | WEIGHT: 207 LBS

## 2023-08-25 DIAGNOSIS — S46.211D RUPTURE OF RIGHT DISTAL BICEPS TENDON, SUBSEQUENT ENCOUNTER: ICD-10-CM

## 2023-08-25 DIAGNOSIS — S46.211A RUPTURE OF RIGHT DISTAL BICEPS TENDON, INITIAL ENCOUNTER: Primary | ICD-10-CM

## 2023-08-25 DIAGNOSIS — S46.211D RUPTURE OF RIGHT DISTAL BICEPS TENDON, SUBSEQUENT ENCOUNTER: Primary | ICD-10-CM

## 2023-08-25 PROCEDURE — 99024 POSTOP FOLLOW-UP VISIT: CPT | Performed by: STUDENT IN AN ORGANIZED HEALTH CARE EDUCATION/TRAINING PROGRAM

## 2023-08-25 PROCEDURE — 97161 PT EVAL LOW COMPLEX 20 MIN: CPT | Performed by: PHYSICAL THERAPIST

## 2023-08-25 RX ORDER — LORATADINE 10 MG
TABLET ORAL
Qty: 180 TABLET | Refills: 0 | Status: SHIPPED | OUTPATIENT
Start: 2023-08-25

## 2023-08-25 NOTE — PROGRESS NOTES
PT Evaluation     Today's date: 2023  Patient name: Sean Banuelos  : 1970  MRN: [id number]  Referring provider: Joseline Roberts DO  Dx:   Encounter Diagnosis     ICD-10-CM    1. Rupture of right distal biceps tendon, initial encounter  S46.211A Ambulatory referral to Physical Therapy     PT plan of care cert/re-cert          Start Time: 0700  Stop Time: 0745  Total time in clinic (min): 45 minutes    Assessment  Assessment details: Sean Banuelos is a pleasant 47 y.o. male who presents s/p R distal biceps repair on 8/3/23 by Dr. Iwona Canas. He will be progressed per provided protocol. Patient's brace had not been applied correctly and he was resting arm at -20 degrees extension without discomfort. Surgeon made aware, as this is a few weeks ahead of protocol. The patient's greatest concerns are fear of not being able to keep active. No further referral appears necessary at this time based upon examination results. Primary movement impairment diagnosis of R elbow ROM resulting in pathoanatomical symptoms of Rupture of right distal biceps tendon, initial encounter  (primary encounter diagnosis) and limiting his ability to carry, lift, perform household chores, perform yard work, sleep, wash behind the back and lift weights. Impairments include:  1) R elbow PROM  2) R elbow AROM  3) R elbow strength    Discussed risks, benefits, and alternatives to treatment, and answered all patient questions to patient satisfaction.   Impairments: abnormal muscle firing, abnormal muscle tone, abnormal or restricted ROM, abnormal movement, impaired physical strength, lacks appropriate home exercise program, pain with function and poor posture   Understanding of Dx/Px/POC: good   Prognosis: good    Goals  Impairment Goals 4-6 weeks  - Decrease pain to <3/10  - Improve elbow AROM to equal to the unaffected upper extremity  - Increase elbow strength to 4+/5 throughout    Functional Goals 6-8 weeks  - Return to Prior Level of Function  - Patient will be independent with HEP  - Patient will be able to reach overhead without increased pain/compensation/difficulty  - Patient will be able to reach behind back without increased pain/compensation/difficulty   - Patient will be able to lift with proper mechanics       Plan  Plan details: Prognosis above is given PT services 2x/week tapering to 1x/week over the next 4 months and home program adherence. Patient would benefit from: skilled physical therapy  Planned modality interventions: cryotherapy, TENS, thermotherapy: hydrocollator packs and unattended electrical stimulation  Planned therapy interventions: abdominal trunk stabilization, behavior modification, body mechanics training, breathing training, flexibility, functional ROM exercises, home exercise program, joint mobilization, manual therapy, massage, Kyle taping, muscle pump exercises, neuromuscular re-education, patient education, postural training, strengthening, stretching, therapeutic activities, therapeutic exercise and therapeutic training  Frequency: 2x week  Duration in weeks: 16  Treatment plan discussed with: patient        Subjective Evaluation    History of Present Illness  Date of surgery: 8/3/2023  Mechanism of injury: surgery  Mechanism of injury: WORK/SCHOOL: Working now. Convince , sits all day. Does not need to lift. HOME LIFE: has help at home with him, 2 daughters, wife  HOBBIES/EXERCISE: bowling, gym daily  PLOF:  No prior injury to elbow  HISTORY OF CURRENT INJURY:  Patient tore his R biceps bowling. He knew immediately that he did something to his arm. He got an x-ray that did not show much. MRI confirmed distal biceps tear. He was scheduled for surgery and underwent distal biceps repair on 8/3/23. He was in a splint for 3 weeks. He was recently put into a hinged brace which is loced at 80 but he is not wearing it well and his elbow appears to be almost straight.  He will see MD today and they will check incision too. He is in the brace    He is R handed  PAIN LOCATION/DESCRIPTORS: Pain in R forearm, over incision, occasionally in the R UT  AGGRAVATING FACTORS: sleeping, moving R arm, lifting anything  EASES: resting, using brace  DAY PATTERN: no pattern  IMAGING:  MRI  1. Complete distal biceps rupture  2. Subchondral edema within the radial head represents either a small bone contusion or a focal grade 4 chondral fissure  SPECIAL QUESTIONS:    Marleen denies a new onset of Tingling and Numbness. PATIENT GOALS: Patient wishes to get back to the gym every day. Patient Goals  Patient goals for therapy: decreased pain, increased motion, increased strength, independence with ADLs/IADLs and return to sport/leisure activities    Pain  Current pain ratin  At best pain ratin  At worst pain ratin  Location: R elbow  Quality: burning, discomfort, dull ache and sharp  Progression: improved          Objective     Observations     Right Elbow   Positive for edema, incision and trophic changes.      Additional Observation Details  Cervical AROM WNL and painfree  Incision covered with scabs, no drainage    Active Range of Motion     Left Elbow   Flexion: 140 degrees   Extension: 5 degrees   Forearm supination: 90 degrees   Forearm pronation: 80 degrees     Additional Active Range of Motion Details  DNT R due to recent surgery    Passive Range of Motion     Left Elbow   Flexion: WFL  Extension: WFL  Forearm supination: WFL  Forearm pronation: WFL    Right Elbow   Flexion: 128 degrees   Extension: -20 degrees   Forearm supination: 40 degrees   Forearm pronation: 60 degrees     Additional Passive Range of Motion Details  Per protocol limited to  per week 3, no pain or resistance at -20 ext    Strength/Myotome Testing     Left Elbow   Flexion: 5  Extension: 5  Forearm supination: 5  Forearm pronation: 5    Additional Strength Details  DNT R arm due to recent surgery     strength L 80 lbs    Swelling   Left Elbow Girth Measurements   Joint line: 28 cm  10 cm above joint line: 34 cm  10 cm below joint line: 28 cm             Diagnosis: R distal biceps repair 8/3/23   Precautions: per protocol   Primary Goals:  Per protocol   *asterisks by exercise = given for HEP   Manuals 8/25       PROM per protocol                                        There Ex        Wrist circles        Gripper        UT S        Levator S        Pendulums in brace                                        Neuro Re-Ed        scap retractions                                                                                                 Re-evaluation              Ther Act                                         Modalities
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
## 2023-08-28 ENCOUNTER — OFFICE VISIT (OUTPATIENT)
Dept: PHYSICAL THERAPY | Facility: CLINIC | Age: 53
End: 2023-08-28
Payer: COMMERCIAL

## 2023-08-28 DIAGNOSIS — S46.211A RUPTURE OF RIGHT DISTAL BICEPS TENDON, INITIAL ENCOUNTER: Primary | ICD-10-CM

## 2023-08-28 PROCEDURE — 97140 MANUAL THERAPY 1/> REGIONS: CPT | Performed by: PHYSICAL THERAPIST

## 2023-08-28 PROCEDURE — 97110 THERAPEUTIC EXERCISES: CPT | Performed by: PHYSICAL THERAPIST

## 2023-08-28 NOTE — PROGRESS NOTES
Daily Note     Today's date: 2023  Patient name: Alisson Corado  : 1970  MRN: [id number]  Referring provider: Alie Gan DO  Dx:   Encounter Diagnosis     ICD-10-CM    1. Rupture of right distal biceps tendon, initial encounter  S46.211A           Start Time: 364  Stop Time: 4626  Total time in clinic (min): 31 minutes    Subjective: Patient reports he feels good, and he got a new brace from Dr. Sherry Monaco which fits much better. He is sleeping ok in the brace. Objective: See treatment diary below    Assessment: Patient;s incision is healing well, with hypertrophic scarring and scabs present but these appear to be slowly sloughing off. No pain at rest. Patient has no stretch with PROM within protocol at this time. Plan: Continue per plan of care.       Diagnosis: R distal biceps repair 8/3/23   Precautions: per protocol   Primary Goals:  Per protocol   *asterisks by exercise = given for HEP   Manuals       PROM per protocol  Pron/sup, ext/flx                                       There Ex        Wrist circles  X 30      Gripper  (putty HEP*)  Blue 1 min ea whole vs ind fingers      UT S*  3x30 sec giovanna      Levator S*  3x30 sec giovanna      Pendulums in brace  X 20                                      Neuro Re-Ed        scap retractions*  2 sec x 20                                                                                               Re-evaluation              Ther Act                                         Modalities

## 2023-08-30 ENCOUNTER — OFFICE VISIT (OUTPATIENT)
Dept: PHYSICAL THERAPY | Facility: CLINIC | Age: 53
End: 2023-08-30
Payer: COMMERCIAL

## 2023-08-30 ENCOUNTER — APPOINTMENT (OUTPATIENT)
Dept: LAB | Facility: CLINIC | Age: 53
End: 2023-08-30
Payer: COMMERCIAL

## 2023-08-30 DIAGNOSIS — I10 PRIMARY HYPERTENSION: ICD-10-CM

## 2023-08-30 DIAGNOSIS — I10 HYPERTENSION GOAL BP (BLOOD PRESSURE) < 140/90: ICD-10-CM

## 2023-08-30 DIAGNOSIS — S46.211A RUPTURE OF RIGHT DISTAL BICEPS TENDON, INITIAL ENCOUNTER: Primary | ICD-10-CM

## 2023-08-30 DIAGNOSIS — E66.9 CLASS 1 OBESITY WITH BODY MASS INDEX (BMI) OF 30.0 TO 30.9 IN ADULT, UNSPECIFIED OBESITY TYPE, UNSPECIFIED WHETHER SERIOUS COMORBIDITY PRESENT: ICD-10-CM

## 2023-08-30 DIAGNOSIS — R80.9 MICROALBUMINURIA: ICD-10-CM

## 2023-08-30 DIAGNOSIS — Z79.4 TYPE 2 DIABETES MELLITUS WITH OTHER SPECIFIED COMPLICATION, WITH LONG-TERM CURRENT USE OF INSULIN (HCC): ICD-10-CM

## 2023-08-30 DIAGNOSIS — E11.69 TYPE 2 DIABETES MELLITUS WITH OTHER SPECIFIED COMPLICATION, WITH LONG-TERM CURRENT USE OF INSULIN (HCC): ICD-10-CM

## 2023-08-30 LAB
ANION GAP SERPL CALCULATED.3IONS-SCNC: 9 MMOL/L
BUN SERPL-MCNC: 17 MG/DL (ref 5–25)
CALCIUM SERPL-MCNC: 9.1 MG/DL (ref 8.4–10.2)
CHLORIDE SERPL-SCNC: 105 MMOL/L (ref 96–108)
CHOLEST SERPL-MCNC: 122 MG/DL
CO2 SERPL-SCNC: 22 MMOL/L (ref 21–32)
CREAT SERPL-MCNC: 0.73 MG/DL (ref 0.6–1.3)
EST. AVERAGE GLUCOSE BLD GHB EST-MCNC: 163 MG/DL
GFR SERPL CREATININE-BSD FRML MDRD: 105 ML/MIN/1.73SQ M
GLUCOSE P FAST SERPL-MCNC: 131 MG/DL (ref 65–99)
HBA1C MFR BLD: 7.3 %
HDLC SERPL-MCNC: 35 MG/DL
LDLC SERPL CALC-MCNC: 60 MG/DL (ref 0–100)
NONHDLC SERPL-MCNC: 87 MG/DL
POTASSIUM SERPL-SCNC: 4.2 MMOL/L (ref 3.5–5.3)
SODIUM SERPL-SCNC: 136 MMOL/L (ref 135–147)
TRIGL SERPL-MCNC: 136 MG/DL

## 2023-08-30 PROCEDURE — 3051F HG A1C>EQUAL 7.0%<8.0%: CPT | Performed by: INTERNAL MEDICINE

## 2023-08-30 PROCEDURE — 36415 COLL VENOUS BLD VENIPUNCTURE: CPT

## 2023-08-30 PROCEDURE — 83036 HEMOGLOBIN GLYCOSYLATED A1C: CPT

## 2023-08-30 PROCEDURE — 80061 LIPID PANEL: CPT

## 2023-08-30 PROCEDURE — 80048 BASIC METABOLIC PNL TOTAL CA: CPT

## 2023-08-30 PROCEDURE — 97140 MANUAL THERAPY 1/> REGIONS: CPT

## 2023-08-30 PROCEDURE — 97110 THERAPEUTIC EXERCISES: CPT

## 2023-08-30 NOTE — PROGRESS NOTES
Daily Note     Today's date: 2023  Patient name: Gregory Esquivel  : 1970  MRN: [id number]  Referring provider: Doe Jenkins DO  Dx:   Encounter Diagnosis     ICD-10-CM    1. Rupture of right distal biceps tendon, initial encounter  S46.211A           Start Time: 82  Stop Time:   Total time in clinic (min): 31 minutes    Subjective: Patient states his arm felt better following last session. He states he is feel good today. Objective: See treatment diary below      Assessment: Continued with outlined program. Patient brace adjusted per week 4 protocol with good tolerance. He was educated on AAROM sup/pron with out any pain or discomfort. Patient tolerated PROM and exercises well. He is progressing well within protocol. Plan: Progress treatment as tolerated.        Diagnosis: R distal biceps repair 8/3/23   Precautions: per protocol   Primary Goals:  Per protocol   *asterisks by exercise = given for HEP   Manuals      PROM per protocol  Pron/sup, ext/flx  Pron/sup, ext/flex                                       There Ex        Wrist circles  X 30 x30 ea      Gripper  (putty HEP*)  Blue 1 min ea whole vs ind fingers Blue 2 min ea      UT S*  3x30 sec giovanna 3x30 sec giovanna      Levator S*  3x30 sec giovanna 3x30 sec giovanna      Pendulums in brace  X 20 x20      AAROM pron/sup   20x ea                              Neuro Re-Ed        scap retractions*  2 sec x 20 2 sec x20                                                                                              Re-evaluation              Ther Act                                         Modalities

## 2023-08-31 ENCOUNTER — OFFICE VISIT (OUTPATIENT)
Dept: ENDOCRINOLOGY | Facility: CLINIC | Age: 53
End: 2023-08-31
Payer: COMMERCIAL

## 2023-08-31 VITALS — BODY MASS INDEX: 31.01 KG/M2 | WEIGHT: 210 LBS

## 2023-08-31 DIAGNOSIS — E78.00 PURE HYPERCHOLESTEROLEMIA: ICD-10-CM

## 2023-08-31 DIAGNOSIS — E11.69 TYPE 2 DIABETES MELLITUS WITH OTHER SPECIFIED COMPLICATION, WITH LONG-TERM CURRENT USE OF INSULIN (HCC): Primary | ICD-10-CM

## 2023-08-31 DIAGNOSIS — Z79.4 TYPE 2 DIABETES MELLITUS WITH OTHER SPECIFIED COMPLICATION, WITH LONG-TERM CURRENT USE OF INSULIN (HCC): Primary | ICD-10-CM

## 2023-08-31 DIAGNOSIS — E66.9 CLASS 1 OBESITY WITH BODY MASS INDEX (BMI) OF 30.0 TO 30.9 IN ADULT, UNSPECIFIED OBESITY TYPE, UNSPECIFIED WHETHER SERIOUS COMORBIDITY PRESENT: ICD-10-CM

## 2023-08-31 DIAGNOSIS — R80.9 MICROALBUMINURIA: ICD-10-CM

## 2023-08-31 DIAGNOSIS — Z79.4 TYPE 2 DIABETES MELLITUS WITH HYPERGLYCEMIA, WITH LONG-TERM CURRENT USE OF INSULIN (HCC): ICD-10-CM

## 2023-08-31 DIAGNOSIS — E11.65 TYPE 2 DIABETES MELLITUS WITH HYPERGLYCEMIA, WITH LONG-TERM CURRENT USE OF INSULIN (HCC): ICD-10-CM

## 2023-08-31 DIAGNOSIS — I10 PRIMARY HYPERTENSION: ICD-10-CM

## 2023-08-31 PROCEDURE — 99214 OFFICE O/P EST MOD 30 MIN: CPT | Performed by: INTERNAL MEDICINE

## 2023-08-31 PROCEDURE — 95251 CONT GLUC MNTR ANALYSIS I&R: CPT | Performed by: INTERNAL MEDICINE

## 2023-08-31 RX ORDER — LISINOPRIL 5 MG/1
5 TABLET ORAL DAILY
Qty: 90 TABLET | Refills: 3 | Status: SHIPPED | OUTPATIENT
Start: 2023-08-31

## 2023-08-31 RX ORDER — ATORVASTATIN CALCIUM 20 MG/1
20 TABLET, FILM COATED ORAL DAILY
Qty: 90 TABLET | Refills: 3 | Status: SHIPPED | OUTPATIENT
Start: 2023-08-31

## 2023-08-31 NOTE — PATIENT INSTRUCTIONS
Continue current regimen  Please resume lisinopril and statin medication  Try to include some protein with all meals avoid quick acting carbohydrates  Follow-up in 3 months with repeat labs  Please come for a download in a few weeks so I can review her blood sugars please check blood sugars more often before each meal and at bedtime at least once in 8 hours

## 2023-08-31 NOTE — PROGRESS NOTES
Louise Ayoub 47 y.o. male MRN: [id number]    Encounter: [id number]      Assessment/Plan     1. Type 2 diabetes mellitus on long term insulin therapy with hyperglycemia   2. Obesity   Last A1c 7.3%, has trended up  On review of continuous glucose monitor, blood sugars have been better controlled, in target range 86% of the time  Hypoglycemia is most prominent postprandially, after breakfast  Recently has been eating differently, usually cereal for breakfast.  Not much protein. Previously was eating eggs    Recommend the following at this time  Continue current regimen  Lifestyle modifications as discussed, advised to try to include more protein in his diet especially with breakfast, should take mealtime insulin before eating rather than after  -Plan for review of pump download in 2 to 3 weeks  -Advised to check blood sugars more often before meals and at bedtime, at least once in 8 hours  -Follow-up in 3 months with repeat labs    3. Hyperlipidemia  resume statin therapy    4. Hypertension  5. microabuminuria   Resume lisinopril       CC: Diabetes    History of Present Illness     HPI:  Louise Ayoub is a 47 y.o. male presents for a follow-up visit regarding diabetes management. Last seen in 2023  Last Eye exam: 2022-no diabetic retinopathy    Recently had distal bicep tendon rupture while bowling in July, repair done on 8/3  Noticed that the BG have been trending up since - less active, stress eating   feeling better, pain better controlled, healing; physical therapy twice a week   Trying to eat better   Lost 3 lbs since last visit     Current regimen:   insulin pump    Synjardy 12.5-1000 mg orally twice a day     Insulin used:  novolog  Active insulin time:3.5 hr  Basal                                                               Time Rate   12 am   1.0      Time Insulin:Carb Ratio Insulin Sensitivity Factor   12A  8.0   40    TDD   Basal   Bolus  Target B    Used to take lisinopril and atorvastatin? accidentally discontinued by ortho       Home glucose monitoring: CGM interpretation   yoly noted on phone   Time percentage CGM worn 67%   Time in range 86% (goal >65-70%)  High 11%  Very high  3%  Low 0%  Very low 0%   Average glucose 141 mg/dL    Symptoms of hypoglycemia :  no lows     Unable to down load pump     All other systems were reviewed and are negative.     Review of Systems      Historical Information   Past Medical History:   Diagnosis Date   • Allergic rhinitis    • Colon polyp    • Diabetes mellitus ([address])     FBS 0700 6/17/22 was 210 via Freestyle Yoly   • Hives     had hives of left shoulder after last colonoscopy 3-4 years ago (2019)-unknown case-PCP rx'd steroid, resolved in 2 days   • Hyperlipidemia    • Hypertension    • PONV (postoperative nausea and vomiting)      Past Surgical History:   Procedure Laterality Date   • COLONOSCOPY  2017   • [place] RINSJ RPTD BICEPS/TRICEPS TDN DSTL W/WO TDN GRF Right 8/3/2023    Procedure: REPAIR TENDON BICEPS;  Surgeon: Ingrid Carreon DO;  Location:  MAIN OR;  Service: Orthopedics   • VEIN LIGATION AND STRIPPING Bilateral      Social History   Social History     Substance and Sexual Activity   Alcohol Use No     Social History     Substance and Sexual Activity   Drug Use No     Social History     Tobacco Use   Smoking Status Every Day   • Packs/day: 1.00   • Years: 20.00   • Total pack years: 20.00   • Types: Cigarettes   Smokeless Tobacco Never   Tobacco Comments    trying to quit 9/17/20 using Chantix which was unsuccessful     Family History:   Family History   Problem Relation Age of Onset   • Diabetes Father    • Heart disease Father    • Diabetes Brother        Meds/Allergies   Current Outpatient Medications   Medication Sig Dispense Refill   • b complex vitamins capsule Take 1 capsule by mouth daily     • Coenzyme Q10 10 MG capsule Take 10 mg by mouth daily     • Continuous Blood Gluc Sensor (FreeStyle Yoly 14 Day Sensor) MISC CHANGE EVERY 14 DAYS 6 each 4   • [facility] Aspirin Adult Low Dose 81 MG chewable tablet CHEW 1 TABLET BY MOUTH TWICE A  tablet 0   • EPINEPHrine (EPIPEN) 0.3 mg/0.3 mL SOAJ EpiPen 2-Neeraj 0.3 mg/0.3 mL injection, auto-injector     • multivitamin (THERAGRAN) TABS Take 1 tablet by mouth daily. • NovoLOG 100 UNIT/ML injection USE 60U VIA PUMP DAILY 50 mL 3   • Omalizumab (XOLAIR SC) Inject under the skin every 30 (thirty) days      • omalizumab (XOLAIR) subcutaneous injection Inject 2 mL (300 mg total) under the skin every 8 weeks 2 mL 11   • Synjardy XR 12.5-1000 MG TB24 TAKE ONE TABLET (125-1000 MG) TWO TIMES A DAY WITH MEALS 180 tablet 3     No current facility-administered medications for this visit. Allergies   Allergen Reactions   • Aspirin      Possible hives reaction   • Crestor [Rosuvastatin] Hives   • Invokana [Canagliflozin] Hives   • Janumet [Sitagliptin-Metformin Hcl] Hives   • Sitagliptin Hives       Objective   Vitals: Weight 95.3 kg (210 lb). Physical Exam    Constitutional:Oriented to person, place, and time  Appears well-developed and well-nourished. Not in any acute distress. HENT:   Head: Normocephalic and atraumatic. Neck: Normal range of motion. Pulmonary/Chest: Effort normal/ breathing comfortably on room air   Musculoskeletal: brace on right arm +  Neurological: Alert and oriented to person, place, and time. Skin: Not diaphoretic. Psychiatric: Normal mood and affect. Behavior is normal.     The history was obtained from the review of the chart, patient.     Lab Results:   Lab Results   Component Value Date/Time    Hemoglobin A1C 7.3 (H) 08/30/2023 07:29 AM    Hemoglobin A1C 6.9 (H) 05/30/2023 07:03 AM    Hemoglobin A1C 7.0 (H) 01/30/2023 07:09 AM    WBC 13.20 (H) 05/30/2023 07:03 AM    WBC 14.20 (H) 01/30/2023 07:09 AM    Hemoglobin 16.9 05/30/2023 07:03 AM    Hemoglobin 17.5 (H) 01/30/2023 07:09 AM    Hematocrit 49.2 05/30/2023 07:03 AM    Hematocrit 52.0 (H) 01/30/2023 07:09 AM    MCV 83 05/30/2023 07:03 AM    MCV 84 01/30/2023 07:09 AM    Platelets 358 37/28/6512 07:03 AM    Platelets 079 90/65/7795 07:09 AM    BUN 17 08/30/2023 07:29 AM    BUN 13 05/30/2023 07:03 AM    BUN 15 01/30/2023 07:09 AM    Potassium 4.2 08/30/2023 07:29 AM    Potassium 3.8 05/30/2023 07:03 AM    Potassium 4.7 01/30/2023 07:09 AM    Chloride 105 08/30/2023 07:29 AM    Chloride 108 05/30/2023 07:03 AM    Chloride 107 01/30/2023 07:09 AM    CO2 22 08/30/2023 07:29 AM    CO2 26 05/30/2023 07:03 AM    CO2 25 01/30/2023 07:09 AM    Creatinine 0.73 08/30/2023 07:29 AM    Creatinine 0.85 05/30/2023 07:03 AM    Creatinine 0.84 01/30/2023 07:09 AM    AST 33 05/30/2023 07:03 AM    AST 28 01/30/2023 07:09 AM    AST 29 09/13/2022 07:20 AM    ALT 38 05/30/2023 07:03 AM    ALT 34 01/30/2023 07:09 AM    ALT 53 09/13/2022 07:20 AM    Total Protein 7.1 05/30/2023 07:03 AM    Total Protein 7.5 01/30/2023 07:09 AM    Total Protein 7.6 09/13/2022 07:20 AM    Albumin 3.8 05/30/2023 07:03 AM    Albumin 3.9 01/30/2023 07:09 AM    Albumin 4.1 09/13/2022 07:20 AM    HDL, Direct 35 (L) 08/30/2023 07:29 AM    HDL, Direct 28 (L) 05/30/2023 07:03 AM    HDL, Direct 35 (L) 01/30/2023 07:09 AM    Triglycerides 136 08/30/2023 07:29 AM    Triglycerides 122 05/30/2023 07:03 AM    Triglycerides 158 (H) 01/30/2023 07:09 AM         Imaging Studies: I have personally reviewed pertinent reports. Portions of the record may have been created with voice recognition software. Occasional wrong word or "sound a like" substitutions may have occurred due to the inherent limitations of voice recognition software. Read the chart carefully and recognize, using context, where substitutions have occurred.

## 2023-09-05 ENCOUNTER — OFFICE VISIT (OUTPATIENT)
Dept: PHYSICAL THERAPY | Facility: CLINIC | Age: 53
End: 2023-09-05
Payer: COMMERCIAL

## 2023-09-05 DIAGNOSIS — S46.211A RUPTURE OF RIGHT DISTAL BICEPS TENDON, INITIAL ENCOUNTER: Primary | ICD-10-CM

## 2023-09-05 PROCEDURE — 97140 MANUAL THERAPY 1/> REGIONS: CPT

## 2023-09-05 PROCEDURE — 97110 THERAPEUTIC EXERCISES: CPT

## 2023-09-05 NOTE — PROGRESS NOTES
Daily Note     Today's date: 2023  Patient name: Bridger Mayorga  : 1970  MRN: [id number]  Referring provider: Paramjit Parr DO  Dx:   Encounter Diagnosis     ICD-10-CM    1. Rupture of right distal biceps tendon, initial encounter  S46.211A           Start Time: 1220  Stop Time: 1252  Total time in clinic (min): 32 minutes    Subjective: Pt reports he is feeling good today. Has no new chief complaints or concerns. Objective: See treatment diary below. Pt is currently 4 weeks and 5 days post op as of today. Assessment: Tolerated treatment well without any exacerbations of pain or symptoms. Continued with flowsheet as indicated below, following protocol as permitted. Patient demonstrated fatigue post treatment, exhibited good technique with therapeutic exercises and would benefit from continued PT. Plan: Continue per plan of care.       Diagnosis: R distal biceps repair 8/3/23   Precautions: per protocol   Primary Goals:  Per protocol   *asterisks by exercise = given for HEP   Manuals     PROM per protocol  Pron/sup, ext/flx  Pron/sup, ext/flex   Pro/sup, ext/flex                                       There Ex        Wrist circles  X 30 x30 ea  x30 ea     Gripper  (putty HEP*)  Blue 1 min ea whole vs ind fingers Blue 2 min ea  Blue 2 min ea     UT S*  3x30 sec giovanna 3x30 sec giovanna  3x30 sec giovanna    Levator S*  3x30 sec giovanna 3x30 sec giovanna  3x30 sec giovanna    Pendulums in brace  X 20 x20  x20    AAROM pron/sup   20x ea  20x ea                             Neuro Re-Ed        scap retractions*  2 sec x 20 2 sec x20 2 sec x  25                                                                                             Re-evaluation              Ther Act                                         Modalities

## 2023-09-06 ENCOUNTER — OFFICE VISIT (OUTPATIENT)
Dept: PHYSICAL THERAPY | Facility: CLINIC | Age: 53
End: 2023-09-06
Payer: COMMERCIAL

## 2023-09-06 DIAGNOSIS — S46.211A RUPTURE OF RIGHT DISTAL BICEPS TENDON, INITIAL ENCOUNTER: Primary | ICD-10-CM

## 2023-09-06 PROCEDURE — 97140 MANUAL THERAPY 1/> REGIONS: CPT | Performed by: PHYSICAL THERAPIST

## 2023-09-06 PROCEDURE — 97110 THERAPEUTIC EXERCISES: CPT | Performed by: PHYSICAL THERAPIST

## 2023-09-06 NOTE — PROGRESS NOTES
Daily Note     Today's date: 2023  Patient name: Ki Medina  : 1970  MRN: [id number]  Referring provider: Myron Palencia DO  Dx:   Encounter Diagnosis     ICD-10-CM    1. Rupture of right distal biceps tendon, initial encounter  S46.211A           Start Time: 915  Stop Time: 940  Total time in clinic (min): 25 minutes    Subjective: Patient denies bicep pain this AM and states no increased pain or soreness from yesterday's session. Objective: See treatment diary below      Assessment: Continued with POC as patient is not able to progress yet. Patient responded well to exercises presented today and had no increase in pain/discomfort. Patient exhibited good technique and performed all exercises with brace on. Brace had been removed for PROM and performed within proper precautions. Patient demonstrated fatigue post treatment session and would benefit from continued therapy to improve bicep strength and ROM . Plan: Continue progressing POC per protocol.       Diagnosis: R distal biceps repair 8/3/23   Precautions: per protocol   Primary Goals:  Per protocol   *asterisks by exercise = given for HEP   Manuals    PROM per protocol  Pron/sup, ext/flx  Pron/sup, ext/flex   Pro/sup, ext/flex    Pro/sup, ext/flex                                      There Ex        Wrist circles  X 30 x30 ea  x30 ea  x30 ea    Gripper  (putty HEP*)  Blue 1 min ea whole vs ind fingers Blue 2 min ea  Blue 2 min ea  Blue 2 min ea    UT S*  3x30 sec giovanna 3x30 sec giovanna  3x30 sec giovanna 3x30 sec giovanna    Levator S*  3x30 sec giovanna 3x30 sec giovanna  3x30 sec giovanna 3x30 sec giovanna    Pendulums in brace  X 20 x20  x20 x20   AAROM pron/sup   20x ea  20x ea  x25   Tricep isometrics     5" x 10                   Neuro Re-Ed        scap retractions*  2 sec x 20 2 sec x20 2 sec x  25 2" x 30                                                                                            Re-evaluation Ther Act                                         Modalities

## 2023-09-12 ENCOUNTER — OFFICE VISIT (OUTPATIENT)
Dept: PHYSICAL THERAPY | Facility: CLINIC | Age: 53
End: 2023-09-12
Payer: COMMERCIAL

## 2023-09-12 DIAGNOSIS — S46.211A RUPTURE OF RIGHT DISTAL BICEPS TENDON, INITIAL ENCOUNTER: Primary | ICD-10-CM

## 2023-09-12 PROCEDURE — 97140 MANUAL THERAPY 1/> REGIONS: CPT

## 2023-09-12 PROCEDURE — 97112 NEUROMUSCULAR REEDUCATION: CPT

## 2023-09-12 PROCEDURE — 97110 THERAPEUTIC EXERCISES: CPT

## 2023-09-12 NOTE — PROGRESS NOTES
Daily Note     Today's date: 2023  Patient name: Donna Botello  : 1970  MRN: [id number]  Referring provider: Ash Garcia DO  Dx:   Encounter Diagnosis     ICD-10-CM    1. Rupture of right distal biceps tendon, initial encounter  S46.211A           Start Time: 1002  Stop Time: 1041  Total time in clinic (min): 39 minutes    Subjective: Patient reports no new complaints or major changes since last session. Patient notes that his R bicep has been feeling better and is happy with his progress/improvements in motion so far. Objective: See treatment diary below      Assessment: Tolerated treatment well. Patient did not experience any onset of symptoms throughout today's session. Patient displayed proper form and had a good recall/understanding of all exercises in his program. Patient displayed the most challenges/difficulties with the motion of supination throughout both PROM and AROM . Muscle fatigue present at the conclusion of session. Patient would benefit from continued PT. Plan: Continue progressing POC per protocol.       Diagnosis: R distal biceps repair 8/3/23   Precautions: per protocol   Primary Goals:  Per protocol   *asterisks by exercise = given for HEP   Manuals    PROM per protocol Pro/sup, ext/flex    Pron/sup, ext/flx  Pron/sup, ext/flex   Pro/sup, ext/flex    Pro/sup, ext/flex                                      There Ex        Wrist circles x30 X 30 x30 ea  x30 ea  x30 ea    Gripper  (putty HEP*) Blue 2 min ea Blue 1 min ea whole vs ind fingers Blue 2 min ea  Blue 2 min ea  Blue 2 min ea    UT S* 3x30 sec giovanna 3x30 sec giovanna 3x30 sec giovanna  3x30 sec giovanna 3x30 sec giovanna    Levator S* 3x30 sec giovanna 3x30 sec giovanna 3x30 sec giovanna  3x30 sec giovanna 3x30 sec giovanna    Pendulums in brace x20 X 20 x20  x20 x20   AAROM pron/sup x25  20x ea  20x ea  x25   Tricep isometrics 5" x 10    5" x 10                   Neuro Re-Ed        scap retractions* 2"x30 2 sec x 20 2 sec x20 2 sec x  25 2" x 30                                                                                            Re-evaluation              Ther Act                                         Modalities                                                           [id number]

## 2023-09-14 ENCOUNTER — OFFICE VISIT (OUTPATIENT)
Dept: PHYSICAL THERAPY | Facility: CLINIC | Age: 53
End: 2023-09-14
Payer: COMMERCIAL

## 2023-09-14 DIAGNOSIS — S46.211A RUPTURE OF RIGHT DISTAL BICEPS TENDON, INITIAL ENCOUNTER: Primary | ICD-10-CM

## 2023-09-14 PROCEDURE — 97110 THERAPEUTIC EXERCISES: CPT | Performed by: PHYSICAL THERAPIST

## 2023-09-14 PROCEDURE — 97140 MANUAL THERAPY 1/> REGIONS: CPT | Performed by: PHYSICAL THERAPIST

## 2023-09-14 NOTE — PROGRESS NOTES
Daily Note     Today's date: 2023  Patient name: Maria E Aguilar  : 1970  MRN: [id number]  Referring provider: Alek Andrews DO  Dx:   Encounter Diagnosis     ICD-10-CM    1. Rupture of right distal biceps tendon, initial encounter  S46.211A           Start Time: 1708  Stop Time: 1740  Total time in clinic (min): 32 minutes    Subjective: Marleen reports "okay" upon arrival to therapy today. Patient to follow-up with surgeon on 23. Objective: See treatment diary below      Assessment: Tolerated treatment fair. Patient would benefit from continued PT  Patient demonstrating good ROM as outlined in protocol. Cueing needed for technique for several exercises with some carryover. Patient continues to verbalize compliance with brace. Patient currently 6 weeks post-op, consider progressing as able next visit. Plan: Continue per plan of care. Progress treatment as tolerated.        Diagnosis: R distal biceps repair 8/3/23   Precautions: per protocol   Primary Goals:  Per protocol   *asterisks by exercise = given for HEP   Manuals    PROM per protocol Pro/sup, ext/flex    Pro/Sup, ext/flex    Pro/sup, ext/flex    Pro/sup, ext/flex                                      There Ex        Wrist circles x30  x 30  x30 ea  x30 ea    Gripper  (putty HEP*) Blue 2 min ea  Blue 2 min   Blue 2 min ea  Blue 2 min ea    UT S* 3x30 sec giovanna 3x30 sec giovanna  3x30 sec giovanna 3x30 sec giovanna    Levator S* 3x30 sec giovanna 3x30 sec giovanna  3x30 sec giovanna 3x30 sec giovanna    Pendulums in brace x20 x20  x20 x20   AAROM pron/sup x25 X 25  20x ea  x25   Tricep isometrics 5" x 10  5" x 10   5" x 10                   Neuro Re-Ed        scap retractions* 2"x30  2" x 30  2 sec x  25 2" x 30                                                                                            Re-evaluation            Ther Act                                   Modalities [id number]

## 2023-09-18 ENCOUNTER — OFFICE VISIT (OUTPATIENT)
Dept: PHYSICAL THERAPY | Facility: CLINIC | Age: 53
End: 2023-09-18
Payer: COMMERCIAL

## 2023-09-18 DIAGNOSIS — S46.211A RUPTURE OF RIGHT DISTAL BICEPS TENDON, INITIAL ENCOUNTER: Primary | ICD-10-CM

## 2023-09-18 PROCEDURE — 97140 MANUAL THERAPY 1/> REGIONS: CPT

## 2023-09-18 PROCEDURE — 97112 NEUROMUSCULAR REEDUCATION: CPT

## 2023-09-18 PROCEDURE — 97110 THERAPEUTIC EXERCISES: CPT

## 2023-09-18 NOTE — PROGRESS NOTES
Daily Note     Today's date: 2023  Patient name: Elana Jarrell  : 1970  MRN: [id number]  Referring provider: Katya Carney DO  Dx:   Encounter Diagnosis     ICD-10-CM    1. Rupture of right distal biceps tendon, initial encounter  S46.211A           Start Time: 0700  Stop Time: 07  Total time in clinic (min): 33 minutes    Subjective: Patient states he is feeling much better. He has no complaints of pain. Objective: See treatment diary below      Assessment: Continued with outlined program. Patient has noticeable improvements with passive and active range each visit. He does lack a few degrees with active elbow flexion. Patient notes some pulling at distal bicep which he was cued to avoid over stressing as this will continue to improve. He returns to Dr. Alexa Whitten at the end of this week as he is progressing well towards goals within protocol. Plan: Progress treatment as tolerated.        Diagnosis: R distal biceps repair 8/3/23   Precautions: per protocol   Primary Goals:  Per protocol   *asterisks by exercise = given for HEP   Manuals    PROM per protocol Pro/sup, ext/flex    Pro/Sup, ext/flex   Pron/sup,ext flex  Pro/sup, ext/flex    Pro/sup, ext/flex                                      There Ex        Wrist circles x30  x 30 x30  x30 ea  x30 ea    Gripper  (putty HEP*) Blue 2 min ea  Blue 2 min  Black 2 min  Blue 2 min ea  Blue 2 min ea    UT S* 3x30 sec giovanna 3x30 sec giovanna 3x30 sec giovanna  3x30 sec giovanna 3x30 sec giovanna    Levator S* 3x30 sec giovanna 3x30 sec giovanna 3x30 sec giovanna  3x30 sec giovanna 3x30 sec giovanna    Pendulums in brace x20 x20 x20  x20 x20   AAROM pron/sup x25 X 25 x30 ea 20x ea  x25   Tricep isometrics 5" x 10  5" x 10 5 sec x10   5" x 10                     Neuro Re-Ed        scap retractions* 2"x30  2" x 30 5 sec x30  2 sec x  25 2" x 30                                                                                            Re-evaluation Ther Act                                   Modalities

## 2023-09-21 ENCOUNTER — EVALUATION (OUTPATIENT)
Dept: PHYSICAL THERAPY | Facility: CLINIC | Age: 53
End: 2023-09-21
Payer: COMMERCIAL

## 2023-09-21 DIAGNOSIS — S46.211A RUPTURE OF RIGHT DISTAL BICEPS TENDON, INITIAL ENCOUNTER: Primary | ICD-10-CM

## 2023-09-21 PROCEDURE — 97110 THERAPEUTIC EXERCISES: CPT | Performed by: PHYSICAL THERAPIST

## 2023-09-21 PROCEDURE — 97112 NEUROMUSCULAR REEDUCATION: CPT | Performed by: PHYSICAL THERAPIST

## 2023-09-21 PROCEDURE — 97140 MANUAL THERAPY 1/> REGIONS: CPT | Performed by: PHYSICAL THERAPIST

## 2023-09-21 NOTE — PROGRESS NOTES
PT Re-Evaluation     Today's date: 2023  Patient name: Sylvain De La Cruz  : 1970  MRN: [id number]  Referring provider: Ning Carrizales DO  Dx:   Encounter Diagnosis     ICD-10-CM    1. Rupture of right distal biceps tendon, initial encounter  S46.211A           Start Time: 07  Stop Time: 08  Total time in clinic (min): 43 minutes    Assessment  Assessment details: Sylvain De La Cruz is a pleasant 47 y.o. male who presents to RE s/p R distal biceps repair on 8/3/23 by Dr. Jian Varner. He is progressing well per protocol, with full PROM into flx and ext and near full AROM at this time. He has been compliant with the brace, and has refrained from lifting per protocol. He would benefit from continued PT to continue working on AROM and progress to strengthening once cleared. Primary movement impairment diagnosis of R elbow ROM resulting in pathoanatomical symptoms of Rupture of right distal biceps tendon, initial encounter  (primary encounter diagnosis) and limiting his ability to carry, lift, perform household chores, perform yard work, sleep, wash behind the back and lift weights. Impairments include:  1) R elbow PROM/AROM  2) R elbow strength    Discussed risks, benefits, and alternatives to treatment, and answered all patient questions to patient satisfaction.   Impairments: abnormal muscle firing, abnormal muscle tone, abnormal or restricted ROM, abnormal movement, impaired physical strength, lacks appropriate home exercise program, pain with function and poor posture   Understanding of Dx/Px/POC: good   Prognosis: good    Goals  Impairment Goals 4-6 weeks  - Decrease pain to <3/10 - met  - Improve elbow AROM to equal to the unaffected upper extremity - partially met  - Increase elbow strength to 4+/5 throughout - not met    Functional Goals 6-8 weeks  - Return to Prior Level of Function - partially met  - Patient will be independent with HEP - met  - Patient will be able to reach overhead without increased pain/compensation/difficulty - met  - Patient will be able to reach behind back without increased pain/compensation/difficulty - met  - Patient will be able to lift with proper mechanics - not met      Plan  Plan details: Prognosis above is given PT services 2x/week tapering to 1x/week over the next 3 months and home program adherence. Patient would benefit from: skilled physical therapy  Planned modality interventions: cryotherapy, TENS, thermotherapy: hydrocollator packs and unattended electrical stimulation  Planned therapy interventions: abdominal trunk stabilization, behavior modification, body mechanics training, breathing training, flexibility, functional ROM exercises, home exercise program, joint mobilization, manual therapy, massage, Kyle taping, muscle pump exercises, neuromuscular re-education, patient education, postural training, strengthening, stretching, therapeutic activities, therapeutic exercise and therapeutic training  Frequency: 2x week  Duration in weeks: 12  Treatment plan discussed with: patient        Subjective Evaluation    History of Present Illness  Date of surgery: 8/3/2023  Mechanism of injury: surgery  Mechanism of injury: WORK/SCHOOL: Working now. Convince , sits all day. Does not need to lift. HOME LIFE: has help at home with him, 2 daughters, wife  HOBBIES/EXERCISE: bowling, gym daily  PLOF:  No prior injury to elbow  HISTORY OF CURRENT INJURY:  Patient tore his R biceps bowling. He knew immediately that he did something to his arm. He got an x-ray that did not show much. MRI confirmed distal biceps tear. He was scheduled for surgery and underwent distal biceps repair on 8/3/23. He was in a splint for 3 weeks. He was recently put into a hinged brace which is loced at 80 but he is not wearing it well and his elbow appears to be almost straight. He will see MD today and they will check incision too.  He is in the brace 24/7   He is R handed    Update: Patient reports he feels much better. He has minimal pain, only some stretch when he straightens his arm all the way. He is compliant with brace. He never needed pain medicine. PAIN LOCATION/DESCRIPTORS: Tenderness over incision  AGGRAVATING FACTORS: end range extension, heavy lifting  Improved: sleeping, moving R arm, lifting light objects  EASES: resting  DAY PATTERN: no pattern  IMAGING:  MRI  1. Complete distal biceps rupture  2. Subchondral edema within the radial head represents either a small bone contusion or a focal grade 4 chondral fissure  SPECIAL QUESTIONS:    Marleen denies a new onset of Tingling and Numbness. PATIENT GOALS: Patient wishes to get back to the gym every day. - 95% improved. He is cleared to do the gym for all body parts except R arm. Patient Goals  Patient goals for therapy: decreased pain, increased motion, increased strength, independence with ADLs/IADLs and return to sport/leisure activities    Pain  Current pain ratin  At best pain ratin  At worst pain ratin  Location: R elbow  Quality: tight  Progression: improved          Objective     Observations     Right Elbow   Positive for incision.      Additional Observation Details  Cervical AROM WNL and painfree  Incision healing well, no scabs remain    Active Range of Motion     Left Elbow   Flexion: 140 degrees   Extension: 5 degrees   Forearm supination: 90 degrees   Forearm pronation: 80 degrees     Right Elbow   Flexion: 138 degrees   Extension: -7 degrees   Forearm supination: 55 degrees   Forearm pronation: 80 degrees     Passive Range of Motion     Left Elbow   Flexion: WFL  Extension: WFL  Forearm supination: WFL  Forearm pronation: WFL    Right Elbow   Flexion: 140 degrees   Extension: 0 degrees   Forearm supination: 50 degrees   Forearm pronation: 80 degrees     Additional Passive Range of Motion Details  With gentle OP on R    Strength/Myotome Testing     Left Elbow   Flexion: 5  Extension: 5  Forearm supination: 5  Forearm pronation: 5    Right Elbow   Flexion: 3+  Extension: 3+  Forearm supination: 3+  Forearm pronation: 3+    Additional Strength Details   strength 80 lbs giovanna  DNT R arm due to protocol    Swelling   Left Elbow Girth Measurements   Joint line: 28 cm  10 cm above joint line: 34 cm  10 cm below joint line: 28 cm    Right Elbow Girth Measurements   Joint line: 28 cm  10 cm above joint line: 33 cm  10 cm below joint line: 29 cm              Diagnosis: R distal biceps repair 8/3/23   Precautions: per protocol   Primary Goals:  Per protocol   *asterisks by exercise = given for HEP   Manuals 9/12 9/14 9/18 9/21 9/6   PROM per protocol Pro/sup, ext/flex    Pro/Sup, ext/flex   Pron/sup,ext flex  Pron/sup, ext flx 0-140 Pro/sup, ext/flex                                      There Ex        Wrist circles x30  x 30 x30  x30 x30 ea    Gripper  (putty HEP*) Blue 2 min ea  Blue 2 min  Black 2 min  Black 2 min ea Blue 2 min ea    UT S* 3x30 sec giovanna 3x30 sec giovanna 3x30 sec giovanna   3x30 sec giovanna    Levator S* 3x30 sec giovanna 3x30 sec giovanna 3x30 sec giovanna   3x30 sec giovanna    Pendulums in brace x20 x20 x20   x20   AROM elbow flx/ext pron/sup x25 X 25 x30 ea AROM x 30 flx/ext in standing x25   Tricep isometrics 5" x 10  5" x 10 5 sec x10   5" x 10   Supination stretch      Elbow at 90, 5 sec x 20            Neuro Re-Ed        scap retractions* 2"x30  2" x 30 5 sec x30   2" x 30   Triceps ext week 8        DB wrist flx/ext week 8        Front and lateral raises week 8                                                                         Re-evaluation           Ther Act                                 Modalities

## 2023-09-22 ENCOUNTER — OFFICE VISIT (OUTPATIENT)
Dept: OBGYN CLINIC | Facility: CLINIC | Age: 53
End: 2023-09-22

## 2023-09-22 VITALS
DIASTOLIC BLOOD PRESSURE: 77 MMHG | WEIGHT: 210 LBS | HEART RATE: 89 BPM | RESPIRATION RATE: 18 BRPM | SYSTOLIC BLOOD PRESSURE: 151 MMHG | HEIGHT: 69 IN | BODY MASS INDEX: 31.1 KG/M2

## 2023-09-22 DIAGNOSIS — S46.211D RUPTURE OF RIGHT DISTAL BICEPS TENDON, SUBSEQUENT ENCOUNTER: Primary | ICD-10-CM

## 2023-09-22 PROCEDURE — 99024 POSTOP FOLLOW-UP VISIT: CPT | Performed by: STUDENT IN AN ORGANIZED HEALTH CARE EDUCATION/TRAINING PROGRAM

## 2023-09-22 NOTE — PROGRESS NOTES
Ortho Sports Medicine Follow-Up Elbow Visit     Assesment:   47 y.o. male status post right distal biceps tendon repair performed on 8/3/2023 with excellent progression. Plan:  The patient's diagnosis and treatment were discussed at length today. We discussed no treatment, non-operative treatment, and operative treatment. Marleen presents today for follow-up evaluation 7 weeks status post right distal biceps tendon repair performed on 8/3/2023. He is doing extremely well at this point time. I discussed with him that he should continue use of his brace and is able to unlock it 0 to 145 degrees of flexion. I discussed with him that over the next 1 to 2 weeks he is able to gradually wean out of his hinged elbow brace at the discretion of his physical therapist.  I discussed with him that should remain nonweightbearing of his right upper extremity until 8 weeks postoperatively. I also discussed with him that he is able to begin strengthening at approximately 10 weeks postoperatively. I did recommend that he continues outpatient physical therapy 2-3 times a week as it will help continue to progress his range of motion and strengthening. He continues ice and over-the-counter medication as needed for pain relief. He is to follow-up in approximately 6 weeks for reevaluation. Conservative treatment:    Ice to elbow 1-2 times daily, for 20 minutes at a time. Continue outpatient physical therapy according to biceps tendon repair protocol. Continue use of hinged elbow brace until 8 weeks postoperatively and then able to wean out of it at the discretion of his physical therapist.  Continue nonweightbearing right upper extremity until 8 weeks postoperatively. Imaging: All imaging from today was reviewed by myself and explained to the patient. Injection:    No Injection planned at this time.     Surgery:     No surgery is recommended at this point, continue with conservative treatment plan as noted.    Follow up:    Return in about 6 weeks (around 11/3/2023) for Recheck. Chief Complaint   Patient presents with   • Right Elbow - Post-op     Sx: 08/03/23       History of Present Illness: The patient is returns for follow up of status post right distal biceps tendon repair performed on 8/3/2023. He states that he is overall doing extremely well at this point time and denies any significant pain or discomfort. He states he has been compliant with outpatient physical therapy and does feel like he is continue to progress appropriately according to his protocol. He states he is also been compliant with use of his hinged elbow brace and is progressing appropriately with his range of motion. He states that he has also been compliant with his weightbearing restrictions. He denies any new injury or trauma to his elbow. He denies any numbness or tingling. He is overall happy with his postsurgical outcomes and progression to physical therapy thus far.     Past Medical, Social and Family History:  Past Medical History:   Diagnosis Date   • Allergic rhinitis    • Colon polyp    • Diabetes mellitus ([address])     FBS 0700 6/17/22 was 210 via [facility] Yoly   • Hives     had hives of left shoulder after last colonoscopy 3-4 years ago (2019)-unknown case-PCP rx'd steroid, resolved in 2 days   • Hyperlipidemia    • Hypertension    • PONV (postoperative nausea and vomiting)      Past Surgical History:   Procedure Laterality Date   • COLONOSCOPY  2017   • FL RINSJ RPTD BICEPS/TRICEPS TDN DSTL W/WO TDN GRF Right 8/3/2023    Procedure: REPAIR TENDON BICEPS;  Surgeon: Felicia Shen DO;  Location:  MAIN OR;  Service: Orthopedics   • VEIN LIGATION AND STRIPPING Bilateral      Allergies   Allergen Reactions   • Aspirin      Possible hives reaction   • Crestor [Rosuvastatin] Hives   • Invokana [Canagliflozin] Hives   • Janumet [Sitagliptin-Metformin Hcl] Hives   • Sitagliptin Hives     Current Outpatient Medications on File Prior to Visit   Medication Sig Dispense Refill   • atorvastatin (LIPITOR) 20 mg tablet Take 1 tablet (20 mg total) by mouth daily 90 tablet 3   • b complex vitamins capsule Take 1 capsule by mouth daily     • Coenzyme Q10 10 MG capsule Take 10 mg by mouth daily     • Continuous Blood Gluc Sensor (FreeStyle Yoly 14 Day Sensor) MISC CHANGE EVERY 14 DAYS 6 each 4   • [facility] Aspirin Adult Low Dose 81 MG chewable tablet CHEW 1 TABLET BY MOUTH TWICE A  tablet 0   • EPINEPHrine (EPIPEN) 0.3 mg/0.3 mL SOAJ EpiPen 2-Neeraj 0.3 mg/0.3 mL injection, auto-injector     • lisinopril (ZESTRIL) 5 mg tablet Take 1 tablet (5 mg total) by mouth daily 90 tablet 3   • multivitamin (THERAGRAN) TABS Take 1 tablet by mouth daily. • NovoLOG 100 UNIT/ML injection USE 60U VIA PUMP DAILY 50 mL 3   • omalizumab (XOLAIR) subcutaneous injection Inject 2 mL (300 mg total) under the skin every 8 weeks 2 mL 11   • Synjardy XR 12.5-1000 MG TB24 TAKE ONE TABLET (125-1000 MG) TWO TIMES A DAY WITH MEALS 180 tablet 3   • Omalizumab (XOLAIR SC) Inject under the skin every 30 (thirty) days        No current facility-administered medications on file prior to visit.      Social History     Socioeconomic History   • Marital status: /Civil Union     Spouse name: Not on file   • Number of children: Not on file   • Years of education: Not on file   • Highest education level: Not on file   Occupational History   • Occupation:    Tobacco Use   • Smoking status: Every Day     Packs/day: 1.00     Years: 20.00     Total pack years: 20.00     Types: Cigarettes   • Smokeless tobacco: Never   • Tobacco comments:     trying to quit 20 using Chantix which was unsuccessful   Vaping Use   • Vaping Use: Never used   Substance and Sexual Activity   • Alcohol use: No   • Drug use: No   • Sexual activity: Yes   Other Topics Concern   • Not on file   Social History Narrative    Most recent tobacco use screenin2019    Do you currently or have you served in the [employer]: No    Were you activated, into active duty, as a member of the [employer] or as a Reservist: No    Occupation:     Marital status: Unknown    Sexual orientation: Heterosexual    Exercise level: Moderate    Diet: Regular    General stress level: Low    Alcohol intake: None    Caffeine intake: Moderate    Chewing tobacco: none    Illicit drugs: no    Guns present in home: No    Seat belts used routinely: Yes    Sunscreen used routinely: Yes    Smoke alarm in home: Yes    Advance directive: No    Salt Intake: yes     Social Determinants of Health     Financial Resource Strain: Not on file   Food Insecurity: Not on file   Transportation Needs: Not on file   Physical Activity: Not on file   Stress: Not on file   Social Connections: Not on file   Intimate Partner Violence: Not on file   Housing Stability: Not on file         I have reviewed the past medical, surgical, social and family history, medications and allergies as documented in the EMR. Review of systems: ROS is negative other than that noted in the HPI. Constitutional: Negative for fatigue and fever. HENT: Negative for sore throat. Respiratory: Negative for shortness of breath. Cardiovascular: Negative for chest pain. Gastrointestinal: Negative for abdominal pain. Endocrine: Negative for cold intolerance and heat intolerance. Genitourinary: Negative for flank pain. Musculoskeletal: Negative for back pain. Skin: Negative for rash. Allergic/Immunologic: Negative for immunocompromised state. Neurological: Negative for dizziness. Psychiatric/Behavioral: Negative for agitation. Physical Exam:    Blood pressure 151/77, pulse 89, resp. rate 18, height 5' 9" (1.753 m), weight 95.3 kg (210 lb).     General/Constitutional: NAD, well developed, well nourished  HENT: Normocephalic, atraumatic  CV: Intact distal pulses, regular rate  Resp: No respiratory distress or labored breathing  Lymphatic: No lymphadenopathy palpated  Neuro: Alert and Oriented x 3, no focal deficits  Psych: Normal mood, normal affect, normal judgement, normal behavior  Skin: Warm, dry, no rashes, no erythema      Elbow focused exam:  Incision is well-healed with no significant hyperkeratosis. No drainage. No erythema. Prior appearance of incision is since resolved.                 RIGHT LEFT   ROM:   full, full extension and near full flexion full   Palpation: Effusion negative negative     Tenderness none none         Instability: Varus/valgus at 0 and 30 degrees  not tested stable   Special Tests: Milking maneuver Not Applicable Negative     tinnels sign of ulnar nerve Not Applicable Negative    Resisted wrist extension Not Applicable Negative    Resisted wrist flexion Not Applicable Negative     Ulnar nerve subluxations Not Applicable Negative   Other:        UE NV Exam: +2 Radial pulses bilaterally  Sensation intact to light touch C5-T1 bilaterally, Radial/median/ulnar nerve motor intact  5/5 MS Deltoid/biceps/triceps/wrist extensors/wrist flexors/finger abduction      Bilateral shoulder, wrist/hand, and forearm ROM full, painless with passive ROM, no ttp or crepitance throughout extremities above wrist joint    Cervical ROM is full without pain, numbness or tingling    Negative spurling maneuver bilaterally           Scribe Attestation    I,:  Yesenia Mohamud am acting as a scribe while in the presence of the attending physician.:       I,:  Ingris Zabala, DO personally performed the services described in this documentation    as scribed in my presence.:

## 2023-09-25 ENCOUNTER — OFFICE VISIT (OUTPATIENT)
Dept: PHYSICAL THERAPY | Facility: CLINIC | Age: 53
End: 2023-09-25
Payer: COMMERCIAL

## 2023-09-25 DIAGNOSIS — S46.211A RUPTURE OF RIGHT DISTAL BICEPS TENDON, INITIAL ENCOUNTER: Primary | ICD-10-CM

## 2023-09-25 PROCEDURE — 97140 MANUAL THERAPY 1/> REGIONS: CPT

## 2023-09-25 PROCEDURE — 97110 THERAPEUTIC EXERCISES: CPT

## 2023-09-25 PROCEDURE — 97112 NEUROMUSCULAR REEDUCATION: CPT

## 2023-09-25 NOTE — PROGRESS NOTES
Daily Note     Today's date: 2023  Patient name: Rosa Champagne  : 1970  MRN: [id number]  Referring provider: Melody Ramirez DO  Dx:   Encounter Diagnosis     ICD-10-CM    1. Rupture of right distal biceps tendon, initial encounter  S46.211A           Start Time: 0700  Stop Time: 0732  Total time in clinic (min): 32 minutes    Subjective: Patient states he is doing well. He states he returned to ortho which states he is doing extremely well and he should wean from his hinged brace over the next week. Objective: See treatment diary below      Assessment: Continued with outlined program. Patient has good tolerance to ROM exercises performed this visit. He exhibits good technique with exercises and is slowly progressing. Educated patient on slowly reducing time while wearing the brace at home as he is allowed to d/c brace on Thursday this week. He should avoid bicep strengthening until week 10, but will be able to slowly introduce strengthening at the end of this week. He is making great progress towards goals. Plan: Progress treatment as tolerated.        Diagnosis: R distal biceps repair 8/3/23   Precautions: per protocol   Primary Goals:  Per protocol   *asterisks by exercise = given for HEP   Manuals    PROM per protocol Pro/sup, ext/flex    Pro/Sup, ext/flex   Pron/sup,ext flex  Pron/sup, ext flx 0-140 Pron/sup, ext, flex                                    There Ex        Wrist circles x30  x 30 x30  x30 x30    Gripper  (putty HEP*) Blue 2 min ea  Blue 2 min  Black 2 min  Black 2 min ea Black 2 min    UT S* 3x30 sec giovanna 3x30 sec giovanna 3x30 sec giovanna   3x30 sec giovanna    Levator S* 3x30 sec giovanna 3x30 sec giovanna 3x30 sec giovanna   3x30 sec giovanna    Pendulums in brace x20 x20 x20      AROM elbow flx/ext pron/sup x25 X 25 x30 ea AROM x 30 flx/ext in standing AROM x30 flex/ext    Tricep isometrics 5" x 10  5" x 10 5 sec x10   5 sec x10    Supination stretch      Elbow at 90, 5 sec x 20 Elbow at 90, 5 sec x20            Neuro Re-Ed        scap retractions* 2"x30  2" x 30 5 sec x30   5 sec x30    Triceps ext week 8     NV   DB wrist flx/ext week 8     NV   Front and lateral raises week 8     NV   Wall push ups     NV                                                            Re-evaluation          Ther Act         Pt education      Wean out of brace by Thursday              Modalities

## 2023-09-28 ENCOUNTER — OFFICE VISIT (OUTPATIENT)
Dept: PHYSICAL THERAPY | Facility: CLINIC | Age: 53
End: 2023-09-28
Payer: COMMERCIAL

## 2023-09-28 DIAGNOSIS — S46.211A RUPTURE OF RIGHT DISTAL BICEPS TENDON, INITIAL ENCOUNTER: Primary | ICD-10-CM

## 2023-09-28 PROCEDURE — 97112 NEUROMUSCULAR REEDUCATION: CPT

## 2023-09-28 PROCEDURE — 97140 MANUAL THERAPY 1/> REGIONS: CPT

## 2023-09-28 PROCEDURE — 97110 THERAPEUTIC EXERCISES: CPT

## 2023-09-28 NOTE — PROGRESS NOTES
Daily Note     Today's date: 2023  Patient name: Ki Medina  : 1970  MRN: [id number]  Referring provider: Myron Palencia DO  Dx:   Encounter Diagnosis     ICD-10-CM    1. Rupture of right distal biceps tendon, initial encounter  S46.211A           Start Time: 0700  Stop Time: 0745  Total time in clinic (min): 45 minutes    Subjective: Patient reports he is feeling a little better. Objective: See treatment diary below      Assessment: Tolerated treatment well. Exercises performed today without post-op brace; discharged brace per surgeon's protocol. Progressed treatment exercises this visit as per surgeon's protocol. Patient performed wrist flexion and extension with 1# dumbbell with good form, slight "stretching" reported with wrist flexion but no reports of pain. Wall push-ups to initiate weight-bearing as per protocol. Patient completed all exercises with good form and without increase in pain reported. Educated on continued activity modifications with Right UE, avoiding heavy lifting, etc.  Patient exhibited good technique with therapeutic exercises and would benefit from continued PT      Plan: Continue per plan of care. Progress treatment as tolerated.        Diagnosis: R distal biceps repair 8/3/23   Precautions: per protocol   Primary Goals:  Per protocol   *asterisks by exercise = given for HEP   Manuals    PROM per protocol Pron/sup, ext, flex  Pro/Sup, ext/flex   Pron/sup,ext flex  Pron/sup, ext flx 0-140 Pron/sup, ext, flex                                    There Ex        Wrist circles x30  x 30 x30  x30 x30    Gripper  (putty HEP*) Blue 2 min  Blue 2 min  Black 2 min  Black 2 min ea Black 2 min    UT S* 3x30 sec giovanna 3x30 sec giovanna 3x30 sec giovanna   3x30 sec giovanna    Levator S* 3x30 sec giovanna 3x30 sec giovanna 3x30 sec giovanna   3x30 sec giovanna    Pendulums in brace  x20 x20      AROM elbow flx/ext pron/sup  X 25 x30 ea AROM x 30 flx/ext in standing AROM x30 flex/ext    Tricep isometrics 5" x 10  5" x 10 5 sec x10   5 sec x10    Supination stretch Elbow at 90, 5 sec x20      Elbow at 90, 5 sec x 20 Elbow at 90, 5 sec x20            Neuro Re-Ed        scap retractions* 2"x30  2" x 30 5 sec x30   5 sec x30    Triceps ext week 8 YTB x20    NV   DB wrist flx/ext week 8 1# 2x10ea    NV   Front and lateral raises week 8 AROM x20ea    NV   Wall push ups Cues, 2x10    NV                                                            Re-evaluation          Ther Act         Pt education  protocol    Wean out of brace by Thursday              Modalities

## 2023-10-02 ENCOUNTER — OFFICE VISIT (OUTPATIENT)
Dept: PHYSICAL THERAPY | Facility: CLINIC | Age: 53
End: 2023-10-02
Payer: COMMERCIAL

## 2023-10-02 DIAGNOSIS — S46.211A RUPTURE OF RIGHT DISTAL BICEPS TENDON, INITIAL ENCOUNTER: Primary | ICD-10-CM

## 2023-10-02 PROCEDURE — 97112 NEUROMUSCULAR REEDUCATION: CPT

## 2023-10-02 PROCEDURE — 97110 THERAPEUTIC EXERCISES: CPT

## 2023-10-02 PROCEDURE — 97140 MANUAL THERAPY 1/> REGIONS: CPT

## 2023-10-02 PROCEDURE — 97530 THERAPEUTIC ACTIVITIES: CPT

## 2023-10-02 NOTE — PROGRESS NOTES
Daily Note     Today's date: 10/2/2023  Patient name: Mando Magaña  : 1970  MRN: [id number]  Referring provider: Jazmin Sidhu DO  Dx:   Encounter Diagnosis     ICD-10-CM    1. Rupture of right distal biceps tendon, initial encounter  S46.211A           Start Time: 0745  Stop Time: 08  Total time in clinic (min): 40 minutes    Subjective: Patient states he is doing well. He states he does not really have any restrictions with his range of motion anymore. Objective: See treatment diary below      Assessment: Continued with outlined program. Patient has noticeable improvements with ROM in all planes and very little stretch at end range extension. He was able to perform strengthening exercises within MD protocol with good tolerance. Patient will be 10 weeks at next appt to slowly introduce bicep strengthening as well. He is making steady progress towards goals. Plan: Progress treatment as tolerated.        Diagnosis: R distal biceps repair 8/3/23   Precautions: per protocol   Primary Goals:  Per protocol   *asterisks by exercise = given for HEP   Manuals 9/28 10/2 9/18 9/21 9/25   PROM per protocol Pron/sup, ext, flex  Pron/sup  Ext/flex Pron/sup,ext flex  Pron/sup, ext flx 0-140 Pron/sup, ext, flex                                    There Ex        Wrist circles x30  x30  x30 x30    Gripper  (putty HEP*) Blue 2 min Blk 2 min  Black 2 min  Black 2 min ea Black 2 min    UT S* 3x30 sec giovanna  3x30 sec giovanna   3x30 sec giovanna    Levator S* 3x30 sec giovanna  3x30 sec giovanna   3x30 sec giovanna    AROM elbow flx/ext pron/sup   x30 ea AROM x 30 flx/ext in standing AROM x30 flex/ext    Tricep isometrics 5" x 10  5 sec x10   5 sec x10    Supination stretch Elbow at 90, 5 sec x20  Elbow at 90,  5 sec x20   Elbow at 90, 5 sec x 20 Elbow at 90, 5 sec x20            Neuro Re-Ed        scap retractions* 2"x30  5 sec x30   5 sec x30    Triceps ext week 8 YTB x20 YTB 2x10    NV   DB wrist flx/ext week 8 1# 2x10ea 1# 2x10 ea    NV   Front and lateral raises week 8 AROM x20ea AROM x20 ea    NV   Wall push ups Cues, 2x10 2x10    NV                                                            Re-evaluation          Ther Act         Pt education  protocol Progressions    Wean out of brace by Thursday              Modalities

## 2023-10-03 ENCOUNTER — OFFICE VISIT (OUTPATIENT)
Dept: FAMILY MEDICINE CLINIC | Facility: CLINIC | Age: 53
End: 2023-10-03
Payer: COMMERCIAL

## 2023-10-03 VITALS
HEART RATE: 95 BPM | DIASTOLIC BLOOD PRESSURE: 60 MMHG | WEIGHT: 209 LBS | HEIGHT: 69 IN | BODY MASS INDEX: 30.96 KG/M2 | OXYGEN SATURATION: 96 % | SYSTOLIC BLOOD PRESSURE: 120 MMHG

## 2023-10-03 DIAGNOSIS — Z00.00 WELL ADULT EXAM: Primary | ICD-10-CM

## 2023-10-03 DIAGNOSIS — E66.09 CLASS 1 OBESITY DUE TO EXCESS CALORIES WITH SERIOUS COMORBIDITY AND BODY MASS INDEX (BMI) OF 30.0 TO 30.9 IN ADULT: ICD-10-CM

## 2023-10-03 DIAGNOSIS — Z23 NEED FOR VACCINATION: ICD-10-CM

## 2023-10-03 DIAGNOSIS — I10 PRIMARY HYPERTENSION: ICD-10-CM

## 2023-10-03 DIAGNOSIS — E78.2 MIXED HYPERLIPIDEMIA: ICD-10-CM

## 2023-10-03 DIAGNOSIS — Z12.2 SCREENING FOR LUNG CANCER: ICD-10-CM

## 2023-10-03 DIAGNOSIS — Z79.4 TYPE 2 DIABETES MELLITUS WITH HYPERGLYCEMIA, WITH LONG-TERM CURRENT USE OF INSULIN (HCC): ICD-10-CM

## 2023-10-03 DIAGNOSIS — F17.200 SMOKER: ICD-10-CM

## 2023-10-03 DIAGNOSIS — E11.65 TYPE 2 DIABETES MELLITUS WITH HYPERGLYCEMIA, WITH LONG-TERM CURRENT USE OF INSULIN (HCC): ICD-10-CM

## 2023-10-03 DIAGNOSIS — N52.9 ERECTILE DYSFUNCTION, UNSPECIFIED ERECTILE DYSFUNCTION TYPE: ICD-10-CM

## 2023-10-03 PROCEDURE — 99214 OFFICE O/P EST MOD 30 MIN: CPT | Performed by: FAMILY MEDICINE

## 2023-10-03 PROCEDURE — 99396 PREV VISIT EST AGE 40-64: CPT | Performed by: FAMILY MEDICINE

## 2023-10-03 PROCEDURE — 90471 IMMUNIZATION ADMIN: CPT | Performed by: FAMILY MEDICINE

## 2023-10-03 PROCEDURE — 90686 IIV4 VACC NO PRSV 0.5 ML IM: CPT | Performed by: FAMILY MEDICINE

## 2023-10-03 RX ORDER — TADALAFIL 5 MG/1
5 TABLET ORAL DAILY
Qty: 90 TABLET | Refills: 1 | Status: SHIPPED | OUTPATIENT
Start: 2023-10-03 | End: 2023-10-03

## 2023-10-03 RX ORDER — PHENTERMINE HYDROCHLORIDE 37.5 MG/1
37.5 TABLET ORAL EVERY MORNING
Qty: 30 TABLET | Refills: 1 | Status: SHIPPED | OUTPATIENT
Start: 2023-10-03

## 2023-10-03 RX ORDER — TADALAFIL 5 MG/1
5 TABLET ORAL DAILY
Qty: 90 TABLET | Refills: 1 | Status: SHIPPED | OUTPATIENT
Start: 2023-10-03

## 2023-10-03 NOTE — PROGRESS NOTES
Assessment/Plan:  1. Well adult exam    2. Class 1 obesity due to excess calories with serious comorbidity and body mass index (BMI) of 30.0 to 30.9 in adult  -     phentermine (ADIPEX-P) 37.5 MG tablet; Take 1 tablet (37.5 mg total) by mouth every morning    3. Mixed hyperlipidemia    4. Primary hypertension    5. Smoker    6. Erectile dysfunction, unspecified erectile dysfunction type  -     tadalafil (CIALIS) 5 MG tablet; Take 1 tablet (5 mg total) by mouth in the morning    7. Screening for lung cancer  -     CT lung screening program; Future; Expected date: 10/03/2023    8. Need for vaccination  -     influenza vaccine, quadrivalent, 0.5 mL, preservative-free, for adult and pediatric patients 6 mos+ ([facility], [facility], [facility], [facility])    9. Type 2 diabetes mellitus with hyperglycemia, with long-term current use of insulin (HCC)      I will prescribe him with phentermine, 1/2 tablet  i will send prescription of sildenefil 5 mg, should be taken daily. I discussed with him that he is a candidate for lung cancer CT screening. The following Shared Decision-Making points were covered:  Benefits of screening were discussed, including the rates of reduction in death from lung cancer and other causes. Harms of screening were reviewed, including false positive tests, radiation exposure levels, risks of invasive procedures, risks of complications of screening, and risk of overdiagnosis. I counseled on the importance of adherence to annual lung cancer LDCT screening, impact of co-morbidities, and ability or willingness to undergo diagnosis and treatment. I counseled on the importance of maintaining abstinence as a former smoker or was counseled on the importance of smoking cessation if a current smoker    Review of Eligibility Criteria: He meets all of the criteria for Lung Cancer Screening. He is 47 y.o.    He has 20 pack year tobacco history and is a current smoker or has quit within the past 15 years  He presents no signs or symptoms of lung cancer    After discussion, the patient decided to elect lung cancer screening. Subjective:      Patient ID: Vivek Nunez is a 47 y.o. male. Vivek Nunez is a 58-year-old male who presents here for a follow-up. The patient presents with a blood pressure reading of 120/60 mmHg today at the clinic. He mentions that his glucose level is a little high and he is still taking Xolair, Novolog and Synjardy for it. He had a surgery done to fix a raptured right distal biceps tendon by Dr. Wilber Mcelroy. He took all kind of medicine up and down there and the surgery went well and he feels better. He mentions that he will go to therapy in the same facility. He confirms losing a little weight, from the weight of 215 pounds at the beginning of the year and now he is weighing 209 pounds. He is still smoking cigarettes but only did it couple of times at work. He started smoking when he was at his 18's. He confirms smoking for 30 years now. He reports that sildenafil 100mg is not taking effects on him. He believes that diabetes is affecting his intimacy with his wife. He confirms edson his A1c result is 73. He mentions that his taking aspirin. He reports that couple of days back, he is experiencing fatigued. He said that he used to have high amount of energy and he attended [facility] gym. He believes that his mood is affecting his feeling lazziness. He confirms his wife is taking phentermine. His intimacy with his wife is doing well. Social History:   His wife is working at their shop. Current Medications. His current medication are Aspirin, Xolair, Novolog, and Synjardy. Immunization. Influenza vaccine was administered today, 10/03/2023.     The following portions of the patient's history were reviewed and updated as appropriate: allergies, current medications, past family history, past medical history, past social history, past surgical history and problem list.    Review of Systems   Constitutional: Negative for activity change, appetite change and fever. HENT: Negative for congestion, nosebleeds and trouble swallowing. Eyes: Negative for itching. Respiratory: Negative for cough and chest tightness. Cardiovascular: Negative for chest pain and palpitations. Gastrointestinal: Negative for abdominal pain, constipation, diarrhea and nausea. Endocrine: Negative for cold intolerance. Genitourinary: Negative for frequency. Musculoskeletal: Negative for gait problem and joint swelling. Skin: Negative for rash. Allergic/Immunologic: Negative for immunocompromised state. Neurological: Negative for dizziness, tremors, seizures, syncope and headaches. Psychiatric/Behavioral: Negative for hallucinations and suicidal ideas. Objective:     /60   Pulse 95   Ht 5' 9" (1.753 m)   Wt 94.8 kg (209 lb)   SpO2 96%   BMI 30.86 kg/m²    Physical Exam  Vitals and nursing note reviewed. Constitutional:     Appearance: Well-developed. HENT:     Head: Normocephalic and atraumatic. Cardiovascular:     Rate and Rhythm: Normal rate and regular rhythm. Heart sounds: Normal heart sounds. No murmur heard. Pulmonary:     Effort: Pulmonary effort is normal.     Breath sounds: Normal breath sounds. No wheezing or rales. Abdominal:     General: Bowel sounds are normal. There is no distension. Palpations: Abdomen is soft. Tenderness: There is no abdominal tenderness. Musculoskeletal:     General: No tenderness. Normal range of motion. Cervical back: Normal range of motion and neck supple. Lymphadenopathy:     Cervical: No cervical adenopathy. Skin:     General: Skin is warm and dry. Capillary Refill: Capillary refill takes less than 2 seconds. Findings: No rash. Neurological:     Mental Status: Alert and oriented to person, place, and time. Cranial Nerves: No cranial nerve deficit. Sensory: No sensory deficit.      Motor: No abnormal muscle tone.  Psychiatric:     Behavior: Behavior normal.     Thought Content: Thought content normal.     Judgment: Judgment normal.      No visits with results within 2 Week(s) from this visit. Latest known visit with results is:   Appointment on 08/30/2023   Component Date Value   • Sodium 08/30/2023 136    • Potassium 08/30/2023 4.2    • Chloride 08/30/2023 105    • CO2 08/30/2023 22    • ANION GAP 08/30/2023 9    • BUN 08/30/2023 17    • Creatinine 08/30/2023 0.73    • Glucose, Fasting 08/30/2023 131 (H)    • Calcium 08/30/2023 9.1    • eGFR 08/30/2023 105    • Cholesterol 08/30/2023 122    • Triglycerides 08/30/2023 136    • HDL, Direct 08/30/2023 35 (L)    • LDL Calculated 08/30/2023 60    • Non-HDL-Chol (CHOL-HDL) 08/30/2023 87    • Hemoglobin A1C 08/30/2023 7.3 (H)    • EAG 08/30/2023 163      I have personally reviewed results with the patient. Isaac Mercado MD   [address]     Transcribed for Isaac Mercado MD, by Travis Veliz on 10/03/23 at 9:34 PM. Powered by [employer].

## 2023-10-05 ENCOUNTER — OFFICE VISIT (OUTPATIENT)
Dept: PHYSICAL THERAPY | Facility: CLINIC | Age: 53
End: 2023-10-05
Payer: COMMERCIAL

## 2023-10-05 DIAGNOSIS — S46.211A RUPTURE OF RIGHT DISTAL BICEPS TENDON, INITIAL ENCOUNTER: Primary | ICD-10-CM

## 2023-10-05 PROCEDURE — 97110 THERAPEUTIC EXERCISES: CPT

## 2023-10-05 PROCEDURE — 97112 NEUROMUSCULAR REEDUCATION: CPT

## 2023-10-05 PROCEDURE — 97530 THERAPEUTIC ACTIVITIES: CPT

## 2023-10-05 PROCEDURE — 97140 MANUAL THERAPY 1/> REGIONS: CPT

## 2023-10-05 NOTE — PROGRESS NOTES
Daily Note     Today's date: 10/5/2023  Patient name: Matti Ashford  : 1970  MRN: [id number]  Referring provider: Felicia Shen DO  Dx:   Encounter Diagnosis     ICD-10-CM    1. Rupture of right distal biceps tendon, initial encounter  S46.211A           Start Time: 06  Stop Time: 0745  Total time in clinic (min): 47 minutes    Subjective: Patient states he is doing well. Objective: See treatment diary below      Assessment: Continued with outlined program. Patient is post op 10 weeks this visit. He was able to progress with light resisted bicep curls in all 3 directions with red theraband with good tolerance; given for HEP. He has good tolerance to all other exercises performed at this time. Trialed scar massage with noticeable restrictions with education provided as patient will continue at home. He has sensitivity which does improve throughout manuals. Patient has improved ROM following at end range extension. Will continue to progress as he is able to tolerate. Plan: Progress treatment as tolerated.        Diagnosis: R distal biceps repair 8/3/23   Precautions: per protocol   Primary Goals:  Per protocol   *asterisks by exercise = given for HEP   Manuals 9/28 10/2 10/5 9/21 9/25   PROM per protocol Pron/sup, ext, flex  Pron/sup  Ext/flex KK, PTA  Pron/sup, ext flx 0-140 Pron/sup, ext, flex    Scar massage   KK, PTA                              There Ex        Wrist circles x30   x30 x30    Gripper  (putty HEP*) Blue 2 min Blk 2 min  Blk 2 min  Black 2 min ea Black 2 min    UT S* 3x30 sec giovanna    3x30 sec giovanna    Levator S* 3x30 sec giovanna    3x30 sec giovanna    AROM elbow flx/ext pron/sup    AROM x 30 flx/ext in standing AROM x30 flex/ext    Tricep isometrics 5" x 10    5 sec x10    Supination stretch Elbow at 90, 5 sec x20  Elbow at 90,  5 sec x20  Elbow at 90, 5 sec x20 Elbow at 90, 5 sec x 20 Elbow at 90, 5 sec x20            Neuro Re-Ed        scap retractions* 2"x30    5 sec x30    Triceps ext * YTB x20 YTB 2x10  RTB 2x10   NV   DB wrist flx/ext week 8 1# 2x10ea 1# 2x10 ea  2# 2x10 ea   NV   Front and lateral raises week 8 AROM x20ea AROM x20 ea  AROM x20 ea   NV   Wall push ups Cues, 2x10 2x10  2x10  NV   Bicep curls (neutral, pro, sup)*   RTB 2x10 ea                                                       Re-evaluation          Ther Act         Pt education  protocol Progressions  HEP   Wean out of brace by Thursday              Modalities

## 2023-10-09 ENCOUNTER — OFFICE VISIT (OUTPATIENT)
Dept: PHYSICAL THERAPY | Facility: CLINIC | Age: 53
End: 2023-10-09
Payer: COMMERCIAL

## 2023-10-09 DIAGNOSIS — S46.211A RUPTURE OF RIGHT DISTAL BICEPS TENDON, INITIAL ENCOUNTER: Primary | ICD-10-CM

## 2023-10-09 PROCEDURE — 97140 MANUAL THERAPY 1/> REGIONS: CPT | Performed by: PHYSICAL THERAPIST

## 2023-10-09 PROCEDURE — 97112 NEUROMUSCULAR REEDUCATION: CPT | Performed by: PHYSICAL THERAPIST

## 2023-10-09 PROCEDURE — 97110 THERAPEUTIC EXERCISES: CPT | Performed by: PHYSICAL THERAPIST

## 2023-10-09 NOTE — PROGRESS NOTES
Daily Note     Today's date: 10/9/2023  Patient name: Abad Jenkins  : 1970  MRN: [id number]  Referring provider: Tabitha Kumar DO  Dx:   Encounter Diagnosis     ICD-10-CM    1. Rupture of right distal biceps tendon, initial encounter  S46.211A           Start Time: 0700  Stop Time: 0740  Total time in clinic (min): 40 minutes    Subjective: Patient states he is doing well, denies any pain or soreness following previous therapy session. Objective: See treatment diary below      Assessment: Continued with outlined program. PT adding isometric bicep curls and skull crushers today with good tolerance overall, evident fatigue with new exercises. Patient reporting occasional "stretch" in elbow, biceps tendon area throughout session, but reports "feels good" following session today. Consider UBE next visit for progression. Will continue to progress as he is able to tolerate. Plan: Progress treatment as tolerated.        Diagnosis: R distal biceps repair 8/3/23   Precautions: per protocol   Primary Goals:  Per protocol   *asterisks by exercise = given for HEP   Manuals 9/28 10/2 10/5  10/9 9/25   PROM per protocol Pron/sup, ext, flex  Pron/sup  Ext/flex KK, PTA   Full range per protocol Pron/sup, ext, flex    Scar massage   KK, PTA   performed                             There Ex        Wrist circles x30   x30 x30    Gripper  (putty HEP*) Blue 2 min Blk 2 min  Blk 2 min  Black 2 min ea Black 2 min    UT S* 3x30 sec giovanna    3x30 sec giovanna    Levator S* 3x30 sec giovanna    3x30 sec giovanna    AROM elbow flx/ext pron/sup    AROM x 30 flx/ext in standing AROM x30 flex/ext    Tricep isometrics 5" x 10    5 sec x10    Supination stretch Elbow at 90, 5 sec x20  Elbow at 90,  5 sec x20  Elbow at 90, 5 sec x20 Elbow at 90, 5 sec x 20 Elbow at 90, 5 sec x20            Neuro Re-Ed        scap retractions* 2"x30    5 sec x30    Triceps ext * YTB x20 YTB 2x10  RTB 2x10   RTB 2x10 NV   DB wrist flx/ext week 8 1# 2x10ea 1# 2x10 ea  2# 2x10 ea  2# 3x10 NV   Front and lateral raises week 8 AROM x20ea AROM x20 ea  AROM x20 ea  2# 3x10 NV   Wall push ups Cues, 2x10 2x10  2x10  2x10 NV   Bicep curls (neutral, pro, sup)*   RTB 2x10 ea   RTB 2x10, standing on TB    Skull crushers     3# 2x10     Isometrics bicep curls at 60*, 90* supinated and pronated     5 sec x 10 ea position                                     Re-evaluation          Ther Act         Pt education  protocol Progressions  HEP   Wean out of brace by Thursday              Modalities

## 2023-10-12 ENCOUNTER — OFFICE VISIT (OUTPATIENT)
Dept: PHYSICAL THERAPY | Facility: CLINIC | Age: 53
End: 2023-10-12
Payer: COMMERCIAL

## 2023-10-12 DIAGNOSIS — S46.211A RUPTURE OF RIGHT DISTAL BICEPS TENDON, INITIAL ENCOUNTER: Primary | ICD-10-CM

## 2023-10-12 PROCEDURE — 97140 MANUAL THERAPY 1/> REGIONS: CPT | Performed by: PHYSICAL THERAPIST

## 2023-10-12 PROCEDURE — 97110 THERAPEUTIC EXERCISES: CPT | Performed by: PHYSICAL THERAPIST

## 2023-10-12 PROCEDURE — 97112 NEUROMUSCULAR REEDUCATION: CPT | Performed by: PHYSICAL THERAPIST

## 2023-10-12 NOTE — PROGRESS NOTES
Daily Note     Today's date: 10/12/2023  Patient name: Griselda Brush  : 1970  MRN: [id number]  Referring provider: Trever Herring DO  Dx:   Encounter Diagnosis     ICD-10-CM    1. Rupture of right distal biceps tendon, initial encounter  S46.211A           Start Time: 703  Stop Time: 4569  Total time in clinic (min): 40 minutes    Subjective: Patient reports that he has minimal pain at this time. He is avoiding lifting still. Objective: See treatment diary below    Assessment: Patient tolerated session well, and is going to progress to isotonics for biceps NV per protocol. Increased resistance or reps for UE strengthening (not biceps) which he tolerated well. Patient nearing full PROM, with some tightness at end ranges of combined supination and elbow extension. Plan: Continue per plan of care.       Diagnosis: R distal biceps repair 8/3/23   Precautions: per protocol   Primary Goals:  Per protocol   *asterisks by exercise = given for HEP   Manuals 9/28 10/2 10/5  10/9 10/12   PROM per protocol Pron/sup, ext, flex  Pron/sup  Ext/flex KK, PTA   Full range per protocol IM, PT   Scar massage   KK, PTA   performed  np                             There Ex        Wrist circles x30   x30 X 30 ea   Gripper  (putty HEP*) Blue 2 min Blk 2 min  Blk 2 min  Black 2 min ea Black 2 min ea   UT S* 3x30 sec giovanna       Levator S* 3x30 sec giovanna       AROM elbow flx/ext pron/sup    AROM x 30 flx/ext in standing AROM x 30 flx/ext in standing   Tricep isometrics 5" x 10       Supination stretch Elbow at 90, 5 sec x20  Elbow at 90,  5 sec x20  Elbow at 90, 5 sec x20 Elbow at 90, 5 sec x 20 Elbow at 90, 5 sec x 20           Neuro Re-Ed        scap retractions* 2"x30       Triceps ext * YTB x20 YTB 2x10  RTB 2x10   RTB 2x10 RTB 3x10   DB wrist flx/ext week 8 1# 2x10ea 1# 2x10 ea  2# 2x10 ea  2# 3x10 3# 3x10   Front and lateral raises week 8 AROM x20ea AROM x20 ea  AROM x20 ea  2# 3x10 3# 3x10   Wall push ups Cues, 2x10 2x10 2x10  2x10 3x10   Bicep curls (neutral, pro, sup)*   RTB 2x10 ea   RTB 2x10, standing on TB RTB 3x10, standing on TB   Skull crushers     3# 2x10 4# 3x10    Isometrics bicep curls at 60*, 90* supinated and pronated     5 sec x 10 ea position 5 sec x 10 ea position   Flexbar      NV Red                            Re-evaluation          Ther Act         Pt education  protocol Progressions  HEP                 Modalities

## 2023-10-16 ENCOUNTER — OFFICE VISIT (OUTPATIENT)
Dept: PHYSICAL THERAPY | Facility: CLINIC | Age: 53
End: 2023-10-16
Payer: COMMERCIAL

## 2023-10-16 DIAGNOSIS — S46.211A RUPTURE OF RIGHT DISTAL BICEPS TENDON, INITIAL ENCOUNTER: Primary | ICD-10-CM

## 2023-10-16 PROCEDURE — 97140 MANUAL THERAPY 1/> REGIONS: CPT

## 2023-10-16 PROCEDURE — 97112 NEUROMUSCULAR REEDUCATION: CPT

## 2023-10-16 PROCEDURE — 97110 THERAPEUTIC EXERCISES: CPT

## 2023-10-16 NOTE — PROGRESS NOTES
Daily Note     Today's date: 10/16/2023  Patient name: Aleixa Gonzales  : 1970  MRN: [id number]  Referring provider: Chantal Wheatley DO  Dx:   Encounter Diagnosis     ICD-10-CM    1. Rupture of right distal biceps tendon, initial encounter  S46.211A                      Subjective: Patient offers no new complaints. Denies pain at start of session. Objective: See treatment diary below      Assessment: Tolerated treatment well. Patient is appropriately challenged with outlined resistance. Good recall of outlined program with minimal cuing for technique. Patient demonstrated fatigue post treatment, exhibited good technique with therapeutic exercises, and would benefit from continued PT      Plan: Progress treatment as tolerated.        Diagnosis: R distal biceps repair 8/3/23   Precautions: per protocol   Primary Goals:  Per protocol   *asterisks by exercise = given for HEP   Manuals 10/16  10/5  10/9 10/12   PROM per protocol JULIANN, PTA  KK, PTA   Full range per protocol IM, PT   Scar massage   KK, PTA   performed  np                           There Ex        Wrist circles X30 ea    x30 X 30 ea   Gripper  (putty HEP*) Black x 2 min ea   Blk 2 min  Black 2 min ea Black 2 min ea   UT S*        Levator S*        AROM elbow flx/ext pron/sup AROM 30x ea   AROM x 30 flx/ext in standing AROM x 30 flx/ext in standing   Tricep isometrics        Supination stretch Elbow at 90* 5 sec 20x  Elbow at 90, 5 sec x20 Elbow at 90, 5 sec x 20 Elbow at 90, 5 sec x 20           Neuro Re-Ed        scap retractions*        Triceps ext * RTB 3x10  RTB 2x10   RTB 2x10 RTB 3x10   DB wrist flx/ext week 8 3# 3x10  2# 2x10 ea  2# 3x10 3# 3x10   Front and lateral raises week 8 3# 3x10  AROM x20 ea  2# 3x10 3# 3x10   Wall push ups 3x10   2x10  2x10 3x10   Bicep curls (neutral, pro, sup)* RTB 3x10 standing on TB  RTB 2x10 ea   RTB 2x10, standing on TB RTB 3x10, standing on TB   Skull crushers 4# 3x10     3# 2x10 4# 3x10    Isometrics bicep curls at 60*, 90* supinated and pronated 5 sec x10 ea position    5 sec x 10 ea position 5 sec x 10 ea position   Flexbar  Red 15x  Pron/sup ea    NV Red                            Re-evaluation         Ther Act        Pt education   HEP                Modalities

## 2023-10-18 ENCOUNTER — HOSPITAL ENCOUNTER (OUTPATIENT)
Dept: CT IMAGING | Facility: HOSPITAL | Age: 53
Discharge: HOME | End: 2023-10-18
Attending: FAMILY MEDICINE
Payer: COMMERCIAL

## 2023-10-18 DIAGNOSIS — Z12.2 SCREENING FOR LUNG CANCER: ICD-10-CM

## 2023-10-18 PROCEDURE — 71271 CT THORAX LUNG CANCER SCR C-: CPT

## 2023-10-19 ENCOUNTER — OFFICE VISIT (OUTPATIENT)
Dept: PHYSICAL THERAPY | Facility: CLINIC | Age: 53
End: 2023-10-19
Payer: COMMERCIAL

## 2023-10-19 DIAGNOSIS — S46.211A RUPTURE OF RIGHT DISTAL BICEPS TENDON, INITIAL ENCOUNTER: Primary | ICD-10-CM

## 2023-10-19 PROCEDURE — 97140 MANUAL THERAPY 1/> REGIONS: CPT

## 2023-10-19 PROCEDURE — 97110 THERAPEUTIC EXERCISES: CPT

## 2023-10-19 PROCEDURE — 97112 NEUROMUSCULAR REEDUCATION: CPT

## 2023-10-19 NOTE — PROGRESS NOTES
Daily Note     Today's date: 10/19/2023  Patient name: Martin Watson  : 1970  MRN: [id number]  Referring provider: Edilma Schmitz DO  Dx:   Encounter Diagnosis     ICD-10-CM    1. Rupture of right distal biceps tendon, initial encounter  S46.211A                      Subjective: Patient states her arm has been feeling ok, he has some tightness in his distal bicep but overall offers no new complaints. Objective: See treatment diary below      Assessment: Tolerated treatment well. Minimal cuing for proper performance. Small cues for tempo to allow greater time under tension. Stretch sensation with pronated bicep curls near distal bicep insertion, no pain. Increased resistance as charted with no compromise in form. He cont to have some restriction at end range combined motion ext+supination. Patient demonstrated fatigue post treatment and would benefit from continued PT      Plan: Progress treatment as tolerated.        Diagnosis: R distal biceps repair 8/3/23   Precautions: per protocol   Primary Goals:  Per protocol   *asterisks by exercise = given for HEP   Manuals 10/16 10/19   10/9 10/12   PROM per protocol JULIANN, PTA JULIANN, PTA   Full range per protocol IM, PT   Scar massage     performed  np                           There Ex        Wrist circles X30 ea  X30   x30 X 30 ea   Gripper  (putty HEP*) Black x 2 min ea  Black x 2 min ea  Black 2 min ea Black 2 min ea   UT S*        Levator S*        AROM elbow flx/ext pron/sup AROM 30x ea AROM 30x ea standing  AROM x 30 flx/ext in standing AROM x 30 flx/ext in standing   Tricep isometrics        Supination stretch Elbow at 90* 5 sec 20x Elbow at 90* 5 sec 20x   Elbow at 90, 5 sec x 20 Elbow at 90, 5 sec x 20           Neuro Re-Ed        scap retractions*        Triceps ext * RTB 3x10 GTB 3x10   RTB 2x10 RTB 3x10   DB wrist flx/ext week 8 3# 3x10 3# 3x10  2# 3x10 3# 3x10   Front and lateral raises week 8 3# 3x10 3# 3x10  2# 3x10 3# 3x10   Wall push ups 3x10 3x10   2x10 3x10   Bicep curls (neutral, pro, sup)* RTB 3x10 standing on TB RTB 3x10 standing on TB    RTB 2x10, standing on TB RTB 3x10, standing on TB   Skull crushers 4# 3x10  5# 3x10   3# 2x10 4# 3x10    Isometrics bicep curls at 60*, 90* supinated and pronated 5 sec x10 ea position 5 sec x10 ea   5 sec x 10 ea position 5 sec x 10 ea position   Flexbar  Red 15x  Pron/sup ea Red 20x pron/sup ea    NV Red                            Re-evaluation         Ther Act        Pt education                  Modalities

## 2023-10-23 ENCOUNTER — OFFICE VISIT (OUTPATIENT)
Dept: PHYSICAL THERAPY | Facility: CLINIC | Age: 53
End: 2023-10-23
Payer: COMMERCIAL

## 2023-10-23 ENCOUNTER — TELEPHONE (OUTPATIENT)
Age: 53
End: 2023-10-23

## 2023-10-23 DIAGNOSIS — S46.211A RUPTURE OF RIGHT DISTAL BICEPS TENDON, INITIAL ENCOUNTER: Primary | ICD-10-CM

## 2023-10-23 PROCEDURE — 97140 MANUAL THERAPY 1/> REGIONS: CPT

## 2023-10-23 PROCEDURE — 97110 THERAPEUTIC EXERCISES: CPT

## 2023-10-23 PROCEDURE — 97112 NEUROMUSCULAR REEDUCATION: CPT

## 2023-10-23 NOTE — PROGRESS NOTES
Daily Note     Today's date: 10/23/2023  Patient name: Navneet Kirkpatrick  : 1970  MRN: [id number]  Referring provider: Yaritza Rodriguez DO  Dx:   Encounter Diagnosis     ICD-10-CM    1. Rupture of right distal biceps tendon, initial encounter  S46.211A           Start Time: 0745  Stop Time: 0830  Total time in clinic (min): 45 minutes    Subjective: Patient reports that Right biceps feels good and he denies any complaints of pain presently or after previous session. Objective: See treatment diary below      Assessment: Increased resistance with standing bicep curls as indicated below with good tolerance and form. Patient did require cuing to slow down pacing throughout session for optimal muscular activation. Tolerated treatment well and demonstrates good recall of all TE. Patient demonstrated fatigue post treatment, exhibited good technique with therapeutic exercises, and would benefit from continued PT      Plan: Continue per plan of care. Progress treatment as tolerated.        Diagnosis: R distal biceps repair 8/3/23   Precautions: per protocol   Primary Goals:  Per protocol   *asterisks by exercise = given for HEP   Manuals 10/16 10/19 10/23  10/12   PROM per protocol JULIANN, PTA JULIANN, PTA JL, PTA  IM, PT   Scar massage     np                           There Ex        Wrist circles X30 ea  X30    X 30 ea   Gripper  (putty HEP*) Black x 2 min ea  Black x 2 min ea Black x2 min  Black 2 min ea   UT S*        Levator S*        AROM elbow flx/ext pron/sup AROM 30x ea AROM 30x ea standing AROM 30x ea standing  AROM x 30 flx/ext in standing   Tricep isometrics        Supination stretch Elbow at 90* 5 sec 20x Elbow at 90* 5 sec 20x  NV  Elbow at 90, 5 sec x 20           Neuro Re-Ed        scap retractions*        Triceps ext * RTB 3x10 GTB 3x10 GTB 3x10  RTB 3x10   DB wrist flx/ext week 8 3# 3x10 3# 3x10 3# 3x10  3# 3x10   Front and lateral raises week 8 3# 3x10 3# 3x10 3# 3x10 ea  3# 3x10   Wall push ups 3x10 3x10 3x10  3x10   Bicep curls (neutral, pro, sup)* RTB 3x10 standing on TB RTB 3x10 standing on TB  RTB 1x10 ea GTB 2x10 ea standing on TB   RTB 3x10, standing on TB   Skull crushers 4# 3x10  5# 3x10 5# 3x10  4# 3x10    Isometrics bicep curls at 60*, 90* supinated and pronated 5 sec x10 ea position 5 sec x10 ea 5" x10 ea  5 sec x 10 ea position   Flexbar  Red 15x  Pron/sup ea Red 20x pron/sup ea  Red 20x pron/sup ea  NV Red                            Re-evaluation         Ther Act        Pt education                 Modalities

## 2023-10-23 NOTE — TELEPHONE ENCOUNTER
Pt's wife called in stating she would like to discuss the results of her 's lunch screening. She's requesting a call back at [phone].

## 2023-10-25 NOTE — TELEPHONE ENCOUNTER
Amrita Pace (wife) called for status of CT of Lung result. Advised after review of chart, results still in process. Will call as soon as results are in and reviewed by provider.

## 2023-10-26 ENCOUNTER — EVALUATION (OUTPATIENT)
Dept: PHYSICAL THERAPY | Facility: CLINIC | Age: 53
End: 2023-10-26
Payer: COMMERCIAL

## 2023-10-26 DIAGNOSIS — S46.211A RUPTURE OF RIGHT DISTAL BICEPS TENDON, INITIAL ENCOUNTER: Primary | ICD-10-CM

## 2023-10-26 PROCEDURE — 97112 NEUROMUSCULAR REEDUCATION: CPT | Performed by: PHYSICAL THERAPIST

## 2023-10-26 PROCEDURE — 97110 THERAPEUTIC EXERCISES: CPT | Performed by: PHYSICAL THERAPIST

## 2023-10-26 NOTE — PROGRESS NOTES
PT Re-Evaluation     Today's date: 10/26/2023  Patient name: Maria E Aguilar  : 1970  MRN: [id number]  Referring provider: Alek Andrews DO  Dx:   Encounter Diagnosis     ICD-10-CM    1. Rupture of right distal biceps tendon, initial encounter  S46.211A           Start Time: 745  Stop Time: 08  Total time in clinic (min): 46 minutes    Assessment  Assessment details: Maria E Aguilar is a pleasant 47 y.o. male who presents to RE s/p R distal biceps repair on 8/3/23 by Dr. Cielo Jay. He is progressing well per protocol, with full ROM and steadily improving strength. He would benefit from continued PT once a week to work on strength and progress to independence with HEP. Primary movement impairment diagnosis of R elbow strength resulting in pathoanatomical symptoms of Rupture of right distal biceps tendon, initial encounter  (primary encounter diagnosis) and limiting his ability to carry, lift, perform household chores, perform yard work, sleep, wash behind the back and lift weights. Impairments include:  1) R elbow strength    Discussed risks, benefits, and alternatives to treatment, and answered all patient questions to patient satisfaction.   Impairments: abnormal muscle firing, abnormal muscle tone, abnormal or restricted ROM, abnormal movement, impaired physical strength, lacks appropriate home exercise program, pain with function and poor posture   Understanding of Dx/Px/POC: good   Prognosis: good    Goals  Impairment Goals 4-6 weeks  - Decrease pain to <3/10 - met  - Improve elbow AROM to equal to the unaffected upper extremity - met  - Increase elbow strength to 4+/5 throughout - partially met    Functional Goals 6-8 weeks  - Return to Prior Level of Function - partially met  - Patient will be independent with HEP - met  - Patient will be able to reach overhead without increased pain/compensation/difficulty - met  - Patient will be able to reach behind back without increased pain/compensation/difficulty - met  - Patient will be able to lift with proper mechanics - partially met      Plan  Plan details: Prognosis above is given PT services 1x/week over the next month and home program adherence. Patient would benefit from: skilled physical therapy  Planned modality interventions: cryotherapy, TENS, thermotherapy: hydrocollator packs and unattended electrical stimulation  Planned therapy interventions: abdominal trunk stabilization, behavior modification, body mechanics training, breathing training, flexibility, functional ROM exercises, home exercise program, joint mobilization, manual therapy, massage, Kyle taping, muscle pump exercises, neuromuscular re-education, patient education, postural training, strengthening, stretching, therapeutic activities, therapeutic exercise and therapeutic training  Frequency: 1x week  Duration in weeks: 4  Treatment plan discussed with: patient        Subjective Evaluation    History of Present Illness  Date of surgery: 8/3/2023  Mechanism of injury: surgery  Mechanism of injury: WORK/SCHOOL: Working now. Convince , sits all day. Does not need to lift. HOME LIFE: has help at home with him, 2 daughters, wife  HOBBIES/EXERCISE: bowling, gym daily  PLOF:  No prior injury to elbow  HISTORY OF CURRENT INJURY:  Patient tore his R biceps bowling. He knew immediately that he did something to his arm. He got an x-ray that did not show much. MRI confirmed distal biceps tear. He was scheduled for surgery and underwent distal biceps repair on 8/3/23. He was in a splint for 3 weeks. He was recently put into a hinged brace which is loced at 80 but he is not wearing it well and his elbow appears to be almost straight. He will see MD today and they will check incision too. He is in the brace 24/7   He is R handed    Update: Patient reports he feels much better, 100% good. He has tightness in the incision. He is going back to MD tomorrow who will likely clear him.  He has minimal shoulder pain at night but no biceps pain. He has full ROM at this point. PAIN LOCATION/DESCRIPTORS: Tenderness over incision  AGGRAVATING FACTORS: heavy lifting  Improved: sleeping, moving R arm, lifting light objects, end range extension  EASES: resting  DAY PATTERN: no pattern  IMAGING:  MRI  1. Complete distal biceps rupture  2. Subchondral edema within the radial head represents either a small bone contusion or a focal grade 4 chondral fissure  SPECIAL QUESTIONS:    Marleen denies a new onset of Tingling and Numbness. PATIENT GOALS: Patient wishes to get back to the gym every day. - 95% improved. Doing biceps strengthening in PT and gym full for other body regions    Patient Goals  Patient goals for therapy: decreased pain, increased motion, increased strength, independence with ADLs/IADLs and return to sport/leisure activities    Pain  Current pain ratin  At best pain ratin  At worst pain ratin  Location: R elbow  Quality: tight  Progression: improved          Objective     Observations     Right Elbow   Positive for incision.      Additional Observation Details  Cervical AROM WNL and painfree  Incision healing well, no scabs remain    Active Range of Motion     Left Elbow   Flexion: 140 degrees   Extension: 5 degrees   Forearm supination: 70 degrees   Forearm pronation: 80 degrees     Right Elbow   Flexion: 149 degrees   Extension: 5 degrees   Forearm supination: 65 degrees   Forearm pronation: 80 degrees     Passive Range of Motion     Left Elbow   Flexion: WFL  Extension: WFL  Forearm supination: WFL  Forearm pronation: WFL    Right Elbow   Flexion: 140 degrees WFL  Extension: 5 degrees WFL  Forearm supination: 70 degrees WFL  Forearm pronation: 80 degrees WFL    Strength/Myotome Testing     Left Elbow   Flexion: 5  Extension: 5  Forearm supination: 5  Forearm pronation: 5    Right Elbow   Flexion: 4  Extension: 5  Forearm supination: 4+  Forearm pronation: 5    Additional Strength Details   strength 85 lbs giovanna      Swelling   Left Elbow Girth Measurements   Joint line: 28 cm  10 cm above joint line: 34 cm  10 cm below joint line: 28 cm    Right Elbow Girth Measurements   Joint line: 28 cm  10 cm above joint line: 34 cm  10 cm below joint line: 28 cm              Diagnosis: R distal biceps repair 8/3/23   Precautions: per protocol   Primary Goals:  Per protocol   *asterisks by exercise = given for HEP   Manuals 10/16 10/19 10/23 10/26 10/12   PROM per protocol JULIANN, PTA JULIANN, PTA JL, PTA NV IM, PT   Scar massage     np                           There Ex        Wrist circles X30 ea  X30   D/c X 30 ea   Gripper  (putty HEP*) Black x 2 min ea  Black x 2 min ea Black x2 min D/c Black 2 min ea   UT S*    D/c    Levator S*    D/c    AROM elbow flx/ext pron/sup AROM 30x ea AROM 30x ea standing AROM 30x ea standing D/c AROM x 30 flx/ext in standing   Tricep isometrics    D/c    Supination stretch Elbow at 90* 5 sec 20x Elbow at 90* 5 sec 20x  NV Elbow at 90* 5 sec 20x  Elbow at 90, 5 sec x 20   UBE    2/2    Neuro Re-Ed        scap retractions*    D/c    Triceps ext * RTB 3x10 GTB 3x10 GTB 3x10  RTB 3x10   DB wrist flx/ext week 8 3# 3x10 3# 3x10 3# 3x10  3# 3x10   Front and lateral raises week 8 3# 3x10 3# 3x10 3# 3x10 ea 5# 2x10 ea 3# 3x10   Wall push ups 3x10  3x10 3x10 Table/bench 3x10   Bicep curls (neutral, pro, sup)* RTB 3x10 standing on TB RTB 3x10 standing on TB  RTB 1x10 ea GTB 2x10 ea standing on TB  DB 5# x 30 ea - up NV   RTB 3x10, standing on TB   Skull crushers 4# 3x10  5# 3x10 5# 3x10  4# 3x10    Isometrics bicep curls at 60*, 90* supinated and pronated 5 sec x10 ea position 5 sec x10 ea 5" x10 ea  5 sec x 10 ea position   Flexbar  Red 15x  Pron/sup ea Red 20x pron/sup ea  Red 20x pron/sup ea Green 20x ea NV Red   Bicep curl [place] add supination    8# 2x10 with rope    DB pron/sup    5# DB holding end slow controlled motion 2x10             Re-evaluation    IM, PT     Ther Act Pt education                 Modalities

## 2023-10-27 ENCOUNTER — OFFICE VISIT (OUTPATIENT)
Dept: OBGYN CLINIC | Facility: CLINIC | Age: 53
End: 2023-10-27

## 2023-10-27 VITALS
HEART RATE: 88 BPM | SYSTOLIC BLOOD PRESSURE: 132 MMHG | HEIGHT: 69 IN | WEIGHT: 209 LBS | BODY MASS INDEX: 30.96 KG/M2 | RESPIRATION RATE: 17 BRPM | DIASTOLIC BLOOD PRESSURE: 79 MMHG

## 2023-10-27 DIAGNOSIS — S46.211D RUPTURE OF RIGHT DISTAL BICEPS TENDON, SUBSEQUENT ENCOUNTER: Primary | ICD-10-CM

## 2023-10-27 PROCEDURE — 99024 POSTOP FOLLOW-UP VISIT: CPT | Performed by: STUDENT IN AN ORGANIZED HEALTH CARE EDUCATION/TRAINING PROGRAM

## 2023-10-27 NOTE — PROGRESS NOTES
Ortho Sports Medicine Follow-Up Elbow Visit     Assesment:   47 y.o. male status post right distal biceps tendon repair performed on 8/3/2023 with excellent progression     Plan:  The patient's diagnosis and treatment were discussed at length today. We discussed no treatment, non-operative treatment, and operative treatment. Marleen presents today for follow-up evaluation nearly 3 months status post right distal biceps tendon repair performed on 8/3/2023. He is doing extremely well at this time. I discussed with him that he can use gentle massage over his incision site as it is causing him some pain and discomfort from time to time likely due to increased scar tissue buildup. He can continue use ice and over-the-counter medication as needed for pain relief. Patient should finish out his remaining physical therapy sessions then return to activities using pain as a guide. I did advise him to use some caution with weight lifting and begin very slowly before progressing to unrestricted activities in another 3 to 4 weeks. Conservative treatment:    Ice to elbow 1-2 times daily, for 20 minutes at a time. Home exercise program iincluding ROM and strenthening. Instructions given to patient of what exercises to perform. Let pain guide return to activities. Continue outpatient physical therapy according to distal biceps tendon repair protocol. Imaging: All imaging from today was reviewed by myself and explained to the patient. ROM:    Full. Brace:     No brace needed. Follow up:    Return in about 2 months (around 12/27/2023) for Recheck. Chief Complaint   Patient presents with    Right Elbow - Post-op     Sx: 08/03/23       History of Present Illness: The patient is returns for follow up s/p right distal biceps tendon repair performed on 8/3/2023. He is doing extremely well at this time and denies any significant pain or discomfort.   He does occasionally get some dull achy pain along his incision, however he notices from time to time and it is improving. He states that he has been compliant with outpatient physical therapy and has reduced it to 1 day a week. He states that they are beginning light strengthening with him and he is tolerating it well with some fatigue afterwards. He states that he is also been compliant with a home exercise program.  He denies any issues or complications since discontinuing use of his hinged elbow brace. He is currently managing his pain with rest and ice. He denies any new injury or trauma. He denies any numbness or tingling. He is overall happy with his postsurgical outcomes and progression to physical therapy.       Past Medical, Social and Family History:  Past Medical History:   Diagnosis Date    Allergic rhinitis     Colon polyp     Diabetes mellitus ([address])     FBS 0700 6/17/22 was [address] via [address]     had hives of left shoulder after last colonoscopy 3-4 years ago (2019)-unknown case-PCP rx'd steroid, resolved in 2 days    Hyperlipidemia     Hypertension     PONV (postoperative nausea and vomiting)      Past Surgical History:   Procedure Laterality Date    COLONOSCOPY  2017    [facility] RINSJ RPTD BICEPS/TRICEPS TDN DSTL W/WO TDN GRF Right 8/3/2023    Procedure: REPAIR TENDON BICEPS;  Surgeon: Edita Ferguson DO;  Location: [facility];  Service: Orthopedics    VEIN LIGATION AND STRIPPING Bilateral      Allergies   Allergen Reactions    Aspirin      Possible hives reaction    Crestor [Rosuvastatin] Hives    Invokana [Canagliflozin] Hives    Janumet [Sitagliptin-Metformin Hcl] Hives    Sitagliptin Hives     Current Outpatient Medications on File Prior to Visit   Medication Sig Dispense Refill    atorvastatin (LIPITOR) 20 mg tablet Take 1 tablet (20 mg total) by mouth daily 90 tablet 3    b complex vitamins capsule Take 1 capsule by mouth daily      Coenzyme Q10 10 MG capsule Take 10 mg by mouth daily      Continuous Blood Gluc Sensor (FreeStyle Yoly 14 Day Sensor) MISC CHANGE EVERY 14 DAYS 6 each 4    [employer] Aspirin Adult Low Dose 81 MG chewable tablet CHEW 1 TABLET BY MOUTH TWICE A  tablet 0    EPINEPHrine (EPIPEN) 0.3 mg/0.3 mL SOAJ EpiPen 2-Neeraj 0.3 mg/0.3 mL injection, auto-injector      lisinopril (ZESTRIL) 5 mg tablet Take 1 tablet (5 mg total) by mouth daily 90 tablet 3    multivitamin (THERAGRAN) TABS Take 1 tablet by mouth daily. NovoLOG 100 UNIT/ML injection USE 60U VIA PUMP DAILY 50 mL 3    Omalizumab (XOLAIR SC) Inject under the skin every 30 (thirty) days       omalizumab (XOLAIR) subcutaneous injection Inject 2 mL (300 mg total) under the skin every 8 weeks 2 mL 11    phentermine (ADIPEX-P) 37.5 MG tablet Take 1 tablet (37.5 mg total) by mouth every morning 30 tablet 1    Synjardy XR 12.5-1000 MG TB24 TAKE ONE TABLET (125-1000 MG) TWO TIMES A DAY WITH MEALS 180 tablet 3    tadalafil (CIALIS) 5 MG tablet Take 1 tablet (5 mg total) by mouth in the morning 90 tablet 1     No current facility-administered medications on file prior to visit.      Social History     Socioeconomic History    Marital status: /Civil Union     Spouse name: Not on file    Number of children: Not on file    Years of education: Not on file    Highest education level: Not on file   Occupational History    Occupation:    Tobacco Use    Smoking status: Every Day     Packs/day: 1.00     Years: 20.00     Total pack years: 20.00     Types: Cigarettes    Smokeless tobacco: Never    Tobacco comments:     trying to quit 20 using Chantix which was unsuccessful   Vaping Use    Vaping Use: Never used   Substance and Sexual Activity    Alcohol use: No    Drug use: No    Sexual activity: Yes   Other Topics Concern    Not on file   Social History Narrative    Most recent tobacco use screenin2019    Do you currently or have you served in the [address] [place] [address] [place]: No    Were you activated, into active duty, as a member of the [employer], [employer] and [employer] or as a Reservist: No    Occupation:     Marital status: Unknown    Sexual orientation: Heterosexual    Exercise level: Moderate    Diet: Regular    General stress level: Low    Alcohol intake: None    Caffeine intake: Moderate    Chewing tobacco: none    Illicit drugs: no    Guns present in home: No    Seat belts used routinely: Yes    Sunscreen used routinely: Yes    Smoke alarm in home: Yes    Advance directive: No    Salt Intake: yes     Social Determinants of Health     Financial Resource Strain: Not on file   Food Insecurity: Not on file   Transportation Needs: Not on file   Physical Activity: Not on file   Stress: Not on file   Social Connections: Not on file   Intimate Partner Violence: Not on file   Housing Stability: Not on file         I have reviewed the past medical, surgical, social and family history, medications and allergies as documented in the EMR. Review of systems: ROS is negative other than that noted in the HPI. Constitutional: Negative for fatigue and fever. HENT: Negative for sore throat. Respiratory: Negative for shortness of breath. Cardiovascular: Negative for chest pain. Gastrointestinal: Negative for abdominal pain. Endocrine: Negative for cold intolerance and heat intolerance. Genitourinary: Negative for flank pain. Musculoskeletal: Negative for back pain. Skin: Negative for rash. Allergic/Immunologic: Negative for immunocompromised state. Neurological: Negative for dizziness. Psychiatric/Behavioral: Negative for agitation. Physical Exam:    Blood pressure 132/79, pulse 88, resp. rate 17, height 5' 9" (1.753 m), weight 94.8 kg (209 lb).     General/Constitutional: NAD, well developed, well nourished  HENT: Normocephalic, atraumatic  CV: Intact distal pulses, regular rate  Resp: No respiratory distress or labored breathing  Lymphatic: No lymphadenopathy palpated  Neuro: Alert and Oriented x 3, no focal deficits  Psych: Normal mood, normal affect, normal judgement, normal behavior  Skin: Warm, dry, no rashes, no erythema      Elbow focused exam:                RIGHT LEFT   ROM:   full full   Palpation: Effusion negative negative     Tenderness Minimal incisional none         Instability: Varus/valgus at 0 and 30 degrees Not tested stable   Special Tests: Milking maneuver Not Applicable Negative     tinnels sign of ulnar nerve Not Applicable Negative    Resisted wrist extension Not Applicable Negative    Resisted wrist flexion Not Applicable Negative     Ulnar nerve subluxations Not Applicable Negative   Other:        UE NV Exam: +2 Radial pulses bilaterally  Sensation intact to light touch C5-T1 bilaterally, Radial/median/ulnar nerve motor intact  5/5 MS Deltoid/biceps/triceps/wrist extensors/wrist flexors/finger abduction      Bilateral shoulder, wrist/hand, and forearm ROM full, painless with passive ROM, no ttp or crepitance throughout extremities above wrist joint    Cervical ROM is full without pain, numbness or tingling    Negative spurling maneuver bilaterally       Scribe Attestation      I,:  Lexie Bales am acting as a scribe while in the presence of the attending physician.:       I,:  Ash Garcia, DO personally performed the services described in this documentation    as scribed in my presence.:

## 2023-10-30 ENCOUNTER — OFFICE VISIT (OUTPATIENT)
Dept: PHYSICAL THERAPY | Facility: CLINIC | Age: 53
End: 2023-10-30
Payer: COMMERCIAL

## 2023-10-30 ENCOUNTER — TELEPHONE (OUTPATIENT)
Dept: FAMILY MEDICINE CLINIC | Facility: CLINIC | Age: 53
End: 2023-10-30

## 2023-10-30 DIAGNOSIS — S46.211A RUPTURE OF RIGHT DISTAL BICEPS TENDON, INITIAL ENCOUNTER: Primary | ICD-10-CM

## 2023-10-30 PROCEDURE — 97530 THERAPEUTIC ACTIVITIES: CPT

## 2023-10-30 PROCEDURE — 97112 NEUROMUSCULAR REEDUCATION: CPT

## 2023-10-30 PROCEDURE — 97140 MANUAL THERAPY 1/> REGIONS: CPT

## 2023-10-30 PROCEDURE — 97110 THERAPEUTIC EXERCISES: CPT

## 2023-10-30 NOTE — TELEPHONE ENCOUNTER
----- Message from Tess Crowe MD sent at 10/29/2023 11:21 AM EDT -----  No lung nodule   And we did see mild emphysema     Smoking is bad     Please quit for good

## 2023-10-30 NOTE — PROGRESS NOTES
Daily Note     Today's date: 10/30/2023  Patient name: Chrissie Prince  : 1970  MRN: [id number]  Referring provider: Che Posey DO  Dx:   Encounter Diagnosis     ICD-10-CM    1. Rupture of right distal biceps tendon, initial encounter  S46.211A           Start Time: 0700  Stop Time: 0740  Total time in clinic (min): 40 minutes    Subjective: Patient states he is doing well. He has been compliant with HEP. Objective: See treatment diary below      Assessment: Continued with outlined program. Patient has good tolerance to PROM in all planes with minimal limitations in pronation. He was able to increase reps and resistance with moderate muscle fatigue. Patient exhibits good technique with bicep strengthening evident of HEP compliance. He is making steady progress towards goals. Plan: Continue per plan of care.       Diagnosis: R distal biceps repair 8/3/23   Precautions: per protocol   Primary Goals:  Per protocol   *asterisks by exercise = given for HEP   Manuals 10/16 10/19 10/23 10/26 10/30   PROM per protocol JULIANN, PTA JULIANN, PTA JL, PTA NV KK, PTA    Scar massage     KK, PTA                            There Ex        Wrist circles X30 ea  X30   D/c    Gripper  (putty HEP*) Black x 2 min ea  Black x 2 min ea Black x2 min D/c    UT S*    D/c    Levator S*    D/c    AROM elbow flx/ext pron/sup AROM 30x ea AROM 30x ea standing AROM 30x ea standing D/c    Tricep isometrics    D/c    Supination stretch Elbow at 90* 5 sec 20x Elbow at 90* 5 sec 20x  NV Elbow at 90* 5 sec 20x  Elbow at 90* 5 sec x20    UBE    2/2 2/2   Neuro Re-Ed        scap retractions*    D/c    Triceps ext * RTB 3x10 GTB 3x10 GTB 3x10  Kimberley 14# 2x10    DB wrist flx/ext week 8 3# 3x10 3# 3x10 3# 3x10     Front and lateral raises week 8 3# 3x10 3# 3x10 3# 3x10 ea 5# 2x10 ea 6# 2x10 ea    Wall push ups 3x10  3x10 3x10 Table/bench 3x10 edge of table   Bicep curls (neutral, pro, sup)* RTB 3x10 standing on TB RTB 3x10 standing on TB  RTB 1x10 ea GTB 2x10 ea standing on TB  DB 5# x 30 ea - up NV   9# x30 ea    Skull crushers 4# 3x10  5# 3x10 5# 3x10  9# 2x10     Isometrics bicep curls at 60*, 90* supinated and pronated 5 sec x10 ea position 5 sec x10 ea 5" x10 ea     Flexbar  Red 15x  Pron/sup ea Red 20x pron/sup ea  Red 20x pron/sup ea Green 20x ea Green 20x ea    Bicep curl [place] add supination    8# 2x10 with rope    DB pron/sup    5# DB holding end slow controlled motion 2x10 6# DB holding end slow controlled motion 2x10    [place]     Rows:15# 2x10  Ext:10# 2x10    Scap stab w/Tband     RTB 30 sec x 4                     Re-evaluation    IM, PT     Ther Act        Pt education     KK, PTA             Modalities

## 2023-11-02 ENCOUNTER — APPOINTMENT (OUTPATIENT)
Dept: PHYSICAL THERAPY | Facility: CLINIC | Age: 53
End: 2023-11-02
Payer: COMMERCIAL

## 2023-11-08 ENCOUNTER — OFFICE VISIT (OUTPATIENT)
Dept: PHYSICAL THERAPY | Facility: CLINIC | Age: 53
End: 2023-11-08
Payer: COMMERCIAL

## 2023-11-08 DIAGNOSIS — S46.211A RUPTURE OF RIGHT DISTAL BICEPS TENDON, INITIAL ENCOUNTER: Primary | ICD-10-CM

## 2023-11-08 PROCEDURE — 97110 THERAPEUTIC EXERCISES: CPT | Performed by: PHYSICAL THERAPIST

## 2023-11-08 PROCEDURE — 97112 NEUROMUSCULAR REEDUCATION: CPT | Performed by: PHYSICAL THERAPIST

## 2023-11-08 PROCEDURE — 97140 MANUAL THERAPY 1/> REGIONS: CPT | Performed by: PHYSICAL THERAPIST

## 2023-11-08 NOTE — PROGRESS NOTES
Daily Note     Today's date: 2023  Patient name: Vivek Nunez  : 1970  MRN: [id number]  Referring provider: Natalie Vasquez DO  Dx:   Encounter Diagnosis     ICD-10-CM    1. Rupture of right distal biceps tendon, initial encounter  S46.211A           Start Time: 0700  Stop Time: 0746  Total time in clinic (min): 46 minutes    Subjective: Patient reports he is doing great. He is independent with HEP and has returned to the gym. He is still avoiding biceps exercises at the gym. Objective: See treatment diary below    Assessment: Tolerated treatment well and is progressing well per protocol . Patient  will be cleared for unrestricted weight-lifting late next week and DC from PT anticipated the week following. He was able to increase weights and resistance for several exercises today with moderate fatigue. Patient encouraged to perform biceps exercises in the gym at the weights utilized in PT. Plan: Continue per plan of care.       Diagnosis: R distal biceps repair 8/3/23   Precautions: per protocol   Primary Goals:  Per protocol   *asterisks by exercise = given for HEP   Manuals 11/8 10/19 10/23 10/26 10/30   PROM per protocol  JULIANN, PTA JL, PTA NV KK, PTA    Scar massage     KK, PTA                            There Ex        Wrist circles  X30   D/c    Gripper  (putty HEP*)  Black x 2 min ea Black x2 min D/c    UT S*    D/c    Levator S*    D/c    AROM elbow flx/ext pron/sup  AROM 30x ea standing AROM 30x ea standing D/c    Tricep isometrics    D/c    Supination stretch Elbow at 90* 5 sec x20  Elbow at 90* 5 sec 20x  NV Elbow at 90* 5 sec 20x  Elbow at 90* 5 sec x20    UBE 2/2   2/2 2/2   Neuro Re-Ed        scap retractions*    D/c    Triceps ext * Kimberley 15# 3x10 GTB 3x10 GTB 3x10  Kim[place] 14# 2x10    DB wrist flx/ext week 8  3# 3x10 3# 3x10     Front and lateral raises week 8 7# 2x10 ea 3# 3x10 3# 3x10 ea 5# 2x10 ea 6# 2x10 ea    Wall push ups 3x10  bench  3x10 3x10 Table/bench 3x10 edge of table Bicep curls (neutral, pro, sup)* 10# 2x15 ea  RTB 3x10 standing on TB  RTB 1x10 ea GTB 2x10 ea standing on TB  DB 5# x 30 ea - up NV   9# x30 ea    Skull crushers  5# 3x10 5# 3x10  9# 2x10     Isometrics bicep curls at 60*, 90* supinated and pronated  5 sec x10 ea 5" x10 ea     Flexbar  Blue 3x10 ea Red 20x pron/sup ea  Red 20x pron/sup ea Green 20x ea Green 20x ea    Bicep curl [place] add supination 10# 2x10 with rope   8# 2x10 with rope    DB pron/sup 7# DB holding end slow controlled motion 2x10    5# DB holding end slow controlled motion 2x10 6# DB holding end slow controlled motion 2x10    [place]     Rows:15# 2x10  Ext:10# 2x10    Scap stab w/Tband     RTB 30 sec x 4                     Re-evaluation    IM, PT     Ther Act        Pt education     KK, PTA             Modalities

## 2023-11-09 ENCOUNTER — TELEPHONE (OUTPATIENT)
Dept: ENDOCRINOLOGY | Facility: CLINIC | Age: 53
End: 2023-11-09

## 2023-11-09 NOTE — TELEPHONE ENCOUNTER
Please confirm with the patient which OmniPod he is currently using. Is interested in upgrading to OmniPod 5 which can be integrated with the Dexcom CGM?

## 2023-11-14 ENCOUNTER — PATIENT MESSAGE (OUTPATIENT)
Dept: ENDOCRINOLOGY | Facility: CLINIC | Age: 53
End: 2023-11-14

## 2023-11-14 DIAGNOSIS — E11.69 TYPE 2 DIABETES MELLITUS WITH OTHER SPECIFIED COMPLICATION, WITH LONG-TERM CURRENT USE OF INSULIN (HCC): Primary | ICD-10-CM

## 2023-11-14 DIAGNOSIS — Z79.4 TYPE 2 DIABETES MELLITUS WITH OTHER SPECIFIED COMPLICATION, WITH LONG-TERM CURRENT USE OF INSULIN (HCC): Primary | ICD-10-CM

## 2023-11-15 ENCOUNTER — OFFICE VISIT (OUTPATIENT)
Dept: PHYSICAL THERAPY | Facility: CLINIC | Age: 53
End: 2023-11-15
Payer: COMMERCIAL

## 2023-11-15 DIAGNOSIS — S46.211A RUPTURE OF RIGHT DISTAL BICEPS TENDON, INITIAL ENCOUNTER: Primary | ICD-10-CM

## 2023-11-15 PROCEDURE — 97112 NEUROMUSCULAR REEDUCATION: CPT

## 2023-11-15 PROCEDURE — 97140 MANUAL THERAPY 1/> REGIONS: CPT

## 2023-11-15 PROCEDURE — 97530 THERAPEUTIC ACTIVITIES: CPT

## 2023-11-15 NOTE — PROGRESS NOTES
Daily Note     Today's date: 11/15/2023  Patient name: Anila Ly  : 1970  MRN: [id number]  Referring provider: Mitchell De La Rosa DO  Dx:   Encounter Diagnosis     ICD-10-CM    1. Rupture of right distal biceps tendon, initial encounter  S46.211A           Start Time: 727  Stop Time: 810  Total time in clinic (min): 43 minutes    Subjective: Patient states he is doing well. He has no restrictions with ROM or strength at this time, he feels good and is ready to do exercises on his own. Objective: See treatment diary below      Assessment: Continued with outlined program. Patient exhibits good technique with strengthening exercises performed this visit. He was able to progress with reps and resistance with moderate muscle fatigue. He has made great progress towards goals. Plan: Potential discharge next visit.      Diagnosis: R distal biceps repair 8/3/23   Precautions: per protocol   Primary Goals:  Per protocol   *asterisks by exercise = given for HEP   Manuals 11/8 11/15 10/23 10/26 10/30   PROM  KK, PTA  JL, PTA NV KK, PTA    Scar massage     KK, PTA                            There Ex        Wrist circles    D/c    Gripper  (putty HEP*)   Black x2 min D/c    UT S*    D/c    Levator S*    D/c    AROM elbow flx/ext pron/sup   AROM 30x ea standing D/c    Tricep isometrics    D/c    Supination stretch Elbow at 90* 5 sec x20   NV Elbow at 90* 5 sec 20x  Elbow at 90* 5 sec x20    UBE 2/2 2/2  2/2 2/2   Neuro Re-Ed        scap retractions*    D/c    Triceps ext * Kimberley 15# 3x10 Kimberley 15# 3x10  GTB 3x10  [place] 14# 2x10    DB wrist flx/ext week 8   3# 3x10     Front and lateral raises week 8 7# 2x10 ea 8# 2x10 ea 3# 3x10 ea 5# 2x10 ea 6# 2x10 ea    Wall push ups 3x10  bench  3x10 bench  3x10 Table/bench 3x10 edge of table   Bicep curls (neutral, pro, sup)* 10# 2x15 ea  15# 2x10 pro  10# 2x10 neut, sup RTB 1x10 ea GTB 2x10 ea standing on TB  DB 5# x 30 ea - up NV   9# x30 ea    Skull crushers  10# 3x10  5# 3x10  9# 2x10    Isometrics bicep curls at 60*, 90* supinated and pronated   5" x10 ea     Flexbar  Blue 3x10 ea Blue 3x10 ea  Red 20x pron/sup ea Green 20x ea Green 20x ea    Bicep curl Kimberley add supination 10# 2x10 with rope 15# 2x10 with rope   8# 2x10 with rope    DB pron/sup 7# DB holding end slow controlled motion 2x10  8# DB holding end slow controlled motion 2x10   5# DB holding end slow controlled motion 2x10 6# DB holding end slow controlled motion 2x10    [place]  Rows:20# 2x10  Ext:15# 2x10    Rows:15# 2x10  Ext:10# 2x10    Scap stab w/Tband  RTB 30 sec x4    RTB 30 sec x 4    Up and overs  6in 5x                                        Re-evaluation    IM, PT     Ther Act        Pt education  KK, PTA  HEP, discharge nv   KK, PTA             Modalities

## 2023-11-17 DIAGNOSIS — Z79.4 TYPE 2 DIABETES MELLITUS WITH OTHER SPECIFIED COMPLICATION, WITH LONG-TERM CURRENT USE OF INSULIN (HCC): Primary | ICD-10-CM

## 2023-11-17 DIAGNOSIS — E11.69 TYPE 2 DIABETES MELLITUS WITH OTHER SPECIFIED COMPLICATION, WITH LONG-TERM CURRENT USE OF INSULIN (HCC): ICD-10-CM

## 2023-11-17 DIAGNOSIS — Z79.4 TYPE 2 DIABETES MELLITUS WITH OTHER SPECIFIED COMPLICATION, WITH LONG-TERM CURRENT USE OF INSULIN (HCC): ICD-10-CM

## 2023-11-17 DIAGNOSIS — E11.69 TYPE 2 DIABETES MELLITUS WITH OTHER SPECIFIED COMPLICATION, WITH LONG-TERM CURRENT USE OF INSULIN (HCC): Primary | ICD-10-CM

## 2023-11-17 RX ORDER — INSULIN PMP CART,AUT,G6/7,CNTR
EACH SUBCUTANEOUS
Qty: 1 KIT | Refills: 0 | Status: SHIPPED | OUTPATIENT
Start: 2023-11-17 | End: 2023-11-17

## 2023-11-17 RX ORDER — INSULIN PMP CART,AUT,G6/7,CNTR
EACH SUBCUTANEOUS
Qty: 6 KIT | Refills: 1 | Status: SHIPPED | OUTPATIENT
Start: 2023-11-17

## 2023-11-20 ENCOUNTER — EVALUATION (OUTPATIENT)
Dept: PHYSICAL THERAPY | Facility: CLINIC | Age: 53
End: 2023-11-20
Payer: COMMERCIAL

## 2023-11-20 ENCOUNTER — TELEPHONE (OUTPATIENT)
Dept: ENDOCRINOLOGY | Facility: CLINIC | Age: 53
End: 2023-11-20

## 2023-11-20 DIAGNOSIS — S46.211A RUPTURE OF RIGHT DISTAL BICEPS TENDON, INITIAL ENCOUNTER: Primary | ICD-10-CM

## 2023-11-20 PROCEDURE — 97112 NEUROMUSCULAR REEDUCATION: CPT | Performed by: PHYSICAL THERAPIST

## 2023-11-20 PROCEDURE — 97110 THERAPEUTIC EXERCISES: CPT | Performed by: PHYSICAL THERAPIST

## 2023-11-20 NOTE — PROGRESS NOTES
PT Discharge    Today's date: 2023  Patient name: Sean Banuelos  : 1970  MRN: [id number]  Referring provider: Joseline Roberts DO  Dx:   Encounter Diagnosis     ICD-10-CM    1. Rupture of right distal biceps tendon, initial encounter  S46.211A           Start Time: 0700  Stop Time: 07  Total time in clinic (min): 31 minutes    Assessment  Assessment details: Sean Banuelos has been compliant with attending physical therapy and completing home exercise program since initial evaluation. Marleen  has made improvements in objective data since initial evalulation and has achieved all goals. Patient reports having returned to their prior level of function. Patient provided with updated Home Exercise Program, all questions answered, and verbalized understanding, agreeing to plan of care. Thus it was mutually decided to discontinue this episode of care and transition to Home Exercise Program.     Impairments: impaired physical strength  Understanding of Dx/Px/POC: good   Prognosis: good    Goals  Impairment Goals 4-6 weeks  - Decrease pain to <3/10 - met  - Improve elbow AROM to equal to the unaffected upper extremity - met  - Increase elbow strength to 4+/5 throughout - met    Functional Goals 6-8 weeks  - Return to Prior Level of Function -  met  - Patient will be independent with HEP - met  - Patient will be able to reach overhead without increased pain/compensation/difficulty - met  - Patient will be able to reach behind back without increased pain/compensation/difficulty - met  - Patient will be able to lift with proper mechanics - met      Plan  Planned therapy interventions: home exercise program  Treatment plan discussed with: patient        Subjective Evaluation    History of Present Illness  Date of surgery: 8/3/2023  Mechanism of injury: surgery  Mechanism of injury: WORK/SCHOOL: Working now. Convince , sits all day. Does not need to lift.    HOME LIFE: has help at home with him, 2 daughters, wife  HOBBIES/EXERCISE: bowling, gym daily  PLOF:  No prior injury to elbow  HISTORY OF CURRENT INJURY:  Patient tore his R biceps bowling. He knew immediately that he did something to his arm. He got an x-ray that did not show much. MRI confirmed distal biceps tear. He was scheduled for surgery and underwent distal biceps repair on 8/3/23. He was in a splint for 3 weeks. He was recently put into a hinged brace which is loced at 80 but he is not wearing it well and his elbow appears to be almost straight. He will see MD today and they will check incision too. He is in the brace    He is R handed    Update: Patient reports he feels much better, 100% good. He is cleared for all activity, no more tightness in incision, and is lifting at the gym. PAIN LOCATION/DESCRIPTORS: Tenderness over incision  AGGRAVATING FACTORS: none  Improved: sleeping, moving R arm, lifting light objects, end range extension, heavy lifting  EASES: resting  DAY PATTERN: no pattern  IMAGING:  MRI  1. Complete distal biceps rupture  2. Subchondral edema within the radial head represents either a small bone contusion or a focal grade 4 chondral fissure  SPECIAL QUESTIONS:    Marleen denies a new onset of Tingling and Numbness. PATIENT GOALS: Patient wishes to get back to the gym every day. - 100% improved    Patient Goals  Patient goals for therapy: decreased pain, increased motion, increased strength, independence with ADLs/IADLs and return to sport/leisure activities    Pain  No pain reported  Current pain ratin  At best pain ratin  At worst pain ratin  Location: R elbow  Progression: resolved          Objective     Observations     Right Elbow   Positive for incision.      Additional Observation Details  Cervical AROM WNL and painfree  Incision healing well, no scabs remain    Active Range of Motion     Left Elbow   Flexion: 140 degrees   Extension: 5 degrees   Forearm supination: 70 degrees   Forearm pronation: 80 degrees     Right Elbow   Flexion: 149 degrees   Extension: 5 degrees   Forearm supination: 65 degrees   Forearm pronation: 80 degrees     Passive Range of Motion     Left Elbow   Flexion: WFL  Extension: WFL  Forearm supination: WFL  Forearm pronation: WFL    Right Elbow   Flexion: 140 degrees WFL  Extension: 5 degrees WFL  Forearm supination: 70 degrees WFL  Forearm pronation: 80 degrees WFL    Strength/Myotome Testing     Left Elbow   Flexion: 5  Extension: 5  Forearm supination: 5  Forearm pronation: 5    Right Elbow   Flexion: 4+  Extension: 5  Forearm supination: 4+  Forearm pronation: 5    Additional Strength Details   strength 90 lbs giovanna      Swelling   Left Elbow Girth Measurements   Joint line: 28 cm  10 cm above joint line: 34 cm  10 cm below joint line: 28 cm    Right Elbow Girth Measurements   Joint line: 28 cm  10 cm above joint line: 34 cm  10 cm below joint line: 28 cm              Diagnosis: R distal biceps repair 8/3/23   Precautions: per protocol   Primary Goals:  Per protocol   *asterisks by exercise = given for HEP   Manuals 11/8 11/15 11/20 10/26 10/30   PROM  KK, PTA   NV KK, PTA    Scar massage     KK, PTA                            There Ex        Wrist circles    D/c    Gripper  (putty HEP*)    D/c    UT S*    D/c    Levator S*    D/c    AROM elbow flx/ext pron/sup    D/c    Tricep isometrics    D/c    Supination stretch Elbow at 90* 5 sec x20    Elbow at 90* 5 sec 20x  Elbow at 90* 5 sec x20    UBE 2/2 2/2 2/2 - detailed review of HEP 2/2 2/2   Neuro Re-Ed        scap retractions*    D/c    Triceps ext * [place] 15# 3x10 [place] 15# 3x10    [place] 14# 2x10    DB wrist flx/ext week 8        Front and lateral raises week 8 7# 2x10 ea 8# 2x10 ea  5# 2x10 ea 6# 2x10 ea    Wall push ups 3x10  bench  3x10 bench   Table/bench 3x10 edge of table   Bicep curls (neutral, pro, sup)* 10# 2x15 ea  15# 2x10 pro  10# 2x10 neut, sup  DB 5# x 30 ea - up NV   9# x30 ea    Skull crushers  10# 3x10    9# 2x10    Isometrics bicep curls at 60*, 90* supinated and pronated        Flexbar  Blue 3x10 ea Blue 3x10 ea   Green 20x ea Green 20x ea    Bicep curl Kimberley add supination 10# 2x10 with rope 15# 2x10 with rope   8# 2x10 with rope    DB pron/sup 7# DB holding end slow controlled motion 2x10  8# DB holding end slow controlled motion 2x10   5# DB holding end slow controlled motion 2x10 6# DB holding end slow controlled motion 2x10    [place]  Rows:20# 2x10  Ext:15# 2x10    Rows:15# 2x10  Ext:10# 2x10    Scap stab w/Tband  RTB 30 sec x4    RTB 30 sec x 4    Up and overs  6in 5x                                        Re-evaluation   IM, PT IM, PT     Ther Act        Pt education  KK, PTA  HEP, discharge nv   KK, PTA             Modalities

## 2023-11-21 DIAGNOSIS — S46.211D RUPTURE OF RIGHT DISTAL BICEPS TENDON, SUBSEQUENT ENCOUNTER: ICD-10-CM

## 2023-11-21 RX ORDER — ASPIRIN 81 MG
TABLET,CHEWABLE ORAL
Qty: 180 TABLET | Refills: 0 | Status: SHIPPED | OUTPATIENT
Start: 2023-11-21

## 2023-11-22 LAB
DME PARACHUTE DELIVERY DATE REQUESTED: NORMAL
DME PARACHUTE ITEM DESCRIPTION: NORMAL
DME PARACHUTE ITEM DESCRIPTION: NORMAL
DME PARACHUTE ORDER STATUS: NORMAL
DME PARACHUTE SUPPLIER NAME: NORMAL
DME PARACHUTE SUPPLIER PHONE: NORMAL

## 2023-11-30 ENCOUNTER — TELEPHONE (OUTPATIENT)
Dept: ENDOCRINOLOGY | Facility: CLINIC | Age: 53
End: 2023-11-30

## 2023-11-30 ENCOUNTER — APPOINTMENT (OUTPATIENT)
Dept: LAB | Facility: CLINIC | Age: 53
End: 2023-11-30
Payer: COMMERCIAL

## 2023-11-30 DIAGNOSIS — E11.69 TYPE 2 DIABETES MELLITUS WITH OTHER SPECIFIED COMPLICATION, WITH LONG-TERM CURRENT USE OF INSULIN (HCC): ICD-10-CM

## 2023-11-30 DIAGNOSIS — E11.65 TYPE 2 DIABETES MELLITUS WITH HYPERGLYCEMIA, WITH LONG-TERM CURRENT USE OF INSULIN (HCC): ICD-10-CM

## 2023-11-30 DIAGNOSIS — Z79.4 TYPE 2 DIABETES MELLITUS WITH HYPERGLYCEMIA, WITH LONG-TERM CURRENT USE OF INSULIN (HCC): ICD-10-CM

## 2023-11-30 DIAGNOSIS — E66.9 CLASS 1 OBESITY WITH BODY MASS INDEX (BMI) OF 30.0 TO 30.9 IN ADULT, UNSPECIFIED OBESITY TYPE, UNSPECIFIED WHETHER SERIOUS COMORBIDITY PRESENT: ICD-10-CM

## 2023-11-30 DIAGNOSIS — I10 PRIMARY HYPERTENSION: ICD-10-CM

## 2023-11-30 DIAGNOSIS — Z79.4 TYPE 2 DIABETES MELLITUS WITH OTHER SPECIFIED COMPLICATION, WITH LONG-TERM CURRENT USE OF INSULIN (HCC): ICD-10-CM

## 2023-11-30 DIAGNOSIS — R80.9 MICROALBUMINURIA: ICD-10-CM

## 2023-11-30 DIAGNOSIS — E78.00 PURE HYPERCHOLESTEROLEMIA: ICD-10-CM

## 2023-11-30 LAB
ALBUMIN SERPL BCP-MCNC: 4.6 G/DL (ref 3.5–5)
ALP SERPL-CCNC: 75 U/L (ref 34–104)
ALT SERPL W P-5'-P-CCNC: 28 U/L (ref 7–52)
ANION GAP SERPL CALCULATED.3IONS-SCNC: 11 MMOL/L
AST SERPL W P-5'-P-CCNC: 25 U/L (ref 13–39)
BILIRUB SERPL-MCNC: 0.5 MG/DL (ref 0.2–1)
BUN SERPL-MCNC: 16 MG/DL (ref 5–25)
CALCIUM SERPL-MCNC: 9.4 MG/DL (ref 8.4–10.2)
CHLORIDE SERPL-SCNC: 103 MMOL/L (ref 96–108)
CHOLEST SERPL-MCNC: 136 MG/DL
CO2 SERPL-SCNC: 25 MMOL/L (ref 21–32)
CREAT SERPL-MCNC: 0.8 MG/DL (ref 0.6–1.3)
EST. AVERAGE GLUCOSE BLD GHB EST-MCNC: 171 MG/DL
GFR SERPL CREATININE-BSD FRML MDRD: 101 ML/MIN/1.73SQ M
GLUCOSE P FAST SERPL-MCNC: 134 MG/DL (ref 65–99)
HBA1C MFR BLD: 7.6 %
HDLC SERPL-MCNC: 33 MG/DL
LDLC SERPL CALC-MCNC: 74 MG/DL (ref 0–100)
NONHDLC SERPL-MCNC: 103 MG/DL
POTASSIUM SERPL-SCNC: 4.4 MMOL/L (ref 3.5–5.3)
PROT SERPL-MCNC: 7.1 G/DL (ref 6.4–8.4)
SODIUM SERPL-SCNC: 139 MMOL/L (ref 135–147)
TRIGL SERPL-MCNC: 146 MG/DL

## 2023-11-30 PROCEDURE — 80053 COMPREHEN METABOLIC PANEL: CPT

## 2023-11-30 PROCEDURE — 36415 COLL VENOUS BLD VENIPUNCTURE: CPT

## 2023-11-30 PROCEDURE — 83036 HEMOGLOBIN GLYCOSYLATED A1C: CPT

## 2023-11-30 PROCEDURE — 3051F HG A1C>EQUAL 7.0%<8.0%: CPT | Performed by: FAMILY MEDICINE

## 2023-11-30 PROCEDURE — 80061 LIPID PANEL: CPT

## 2023-11-30 NOTE — TELEPHONE ENCOUNTER
Dr. Omid Dee : Pt.'s Wife LM stating Dexcom G7  rep told her Dexcom G7 will not work with Omnipod 5 so we are cx.  Dexcom and keeping Yoly freestyle as  before since is compay`with  Thanks

## 2023-12-01 ENCOUNTER — OFFICE VISIT (OUTPATIENT)
Dept: ENDOCRINOLOGY | Facility: CLINIC | Age: 53
End: 2023-12-01
Payer: COMMERCIAL

## 2023-12-01 VITALS
OXYGEN SATURATION: 95 % | SYSTOLIC BLOOD PRESSURE: 120 MMHG | HEART RATE: 92 BPM | BODY MASS INDEX: 30.96 KG/M2 | DIASTOLIC BLOOD PRESSURE: 62 MMHG | HEIGHT: 69 IN | WEIGHT: 209 LBS

## 2023-12-01 DIAGNOSIS — Z79.4 TYPE 2 DIABETES MELLITUS WITH OTHER SPECIFIED COMPLICATION, WITH LONG-TERM CURRENT USE OF INSULIN (HCC): ICD-10-CM

## 2023-12-01 DIAGNOSIS — E11.65 TYPE 2 DIABETES MELLITUS WITH HYPERGLYCEMIA, WITH LONG-TERM CURRENT USE OF INSULIN (HCC): ICD-10-CM

## 2023-12-01 DIAGNOSIS — Z79.4 TYPE 2 DIABETES MELLITUS WITH HYPERGLYCEMIA, WITH LONG-TERM CURRENT USE OF INSULIN (HCC): ICD-10-CM

## 2023-12-01 DIAGNOSIS — E11.69 TYPE 2 DIABETES MELLITUS WITH OTHER SPECIFIED COMPLICATION, WITH LONG-TERM CURRENT USE OF INSULIN (HCC): ICD-10-CM

## 2023-12-01 PROCEDURE — 99214 OFFICE O/P EST MOD 30 MIN: CPT | Performed by: NURSE PRACTITIONER

## 2023-12-01 PROCEDURE — 95251 CONT GLUC MNTR ANALYSIS I&R: CPT | Performed by: NURSE PRACTITIONER

## 2023-12-01 RX ORDER — INSULIN PMP CART,AUT,G6/7,CNTR
EACH SUBCUTANEOUS
Qty: 6 KIT | Refills: 1 | Status: SHIPPED | OUTPATIENT
Start: 2023-12-01 | End: 2023-12-06

## 2023-12-01 RX ORDER — ACYCLOVIR 400 MG/1
1 TABLET ORAL
Qty: 3 EACH | Refills: 2 | Status: SHIPPED | OUTPATIENT
Start: 2023-12-01

## 2023-12-01 RX ORDER — ACYCLOVIR 400 MG/1
1 TABLET ORAL CONTINUOUS
Qty: 1 EACH | Refills: 0 | Status: SHIPPED | OUTPATIENT
Start: 2023-12-01

## 2023-12-01 RX ORDER — FLASH GLUCOSE SENSOR
KIT MISCELLANEOUS
Qty: 6 EACH | Refills: 4 | Status: CANCELLED | OUTPATIENT
Start: 2023-12-01

## 2023-12-01 NOTE — ASSESSMENT & PLAN NOTE
Lab Results   Component Value Date    HGBA1C 7.6 (H) 11/30/2023     HGA1C is not consistent with the GMI on continuous glucose monitor which is showing a GMI of approximately 6.8% however CGM is only active approximately 66% of the time which may skew the data. Patient is due for update in insulin pump currently has OmniPod  this will be discontinued. Patient would like to transition to OmniPod 5 insulin pump. He currently is using a emily 14-day continuous glucose monitor. Would recommend transition to Dexcom G7 continuous glucose monitor as OmniPod 5 will shortly link with the Dexcom G7 for automation of the insulin pump. Will place order for Dexcom G7 with  and diabetes education referral to transition to continuous glucose monitor. Patient has approximately 2 to 3 months of supply left of the OmniPod  pods. Placed order for OmniPod 5 introduction kit. And patient will use up current pod supply and then transition to the OmniPod 5 if covered by insurance. Will plan to continue current regimen on OmniPod 5 insulin pump with Synjardy. Treatment regimen: Continue current regimen. Discussed risks/complications associated with uncontrolled diabetes including organ involvement, heart attack, stroke, death. Recommended referral to diabetes education. Referral placed. Advised lifestyle modifications including attention to diet including the amount and types of carbohydrates consumed and regular activity. Call for blood sugars less than 70 mg/dl or patterns over 250 mg/dl. Discussed use of CGM to collect additional blood glucose data to reveal patterns that can be used to improve glucose levels and adjust insulin regimen. Will transition to [address] [place] [address]. Discussed symptoms and treatment of hypoglycemia. Reviewed risks associated with hypoglycemia. Always carry rapid acting carbohydrates and a glucometer (a way to check your blood sugar).     Recommendation for medical identification either bracelet, necklace. Routine follow up for diabetic eye and foot exams. Ordered blood work to complete prior to next visit. Follow up in 3 months.

## 2023-12-01 NOTE — PROGRESS NOTES
Established Patient Progress Note      CC: Type 2 diabetes mellitus       Impression & Plan:    Problem List Items Addressed This Visit          Endocrine    Diabetes mellitus ([facility])       Lab Results   Component Value Date    HGBA1C 7.6 (H) 11/30/2023   HGA1C is not consistent with the GMI on continuous glucose monitor which is showing a GMI of approximately 6.8% however CGM is only active approximately 66% of the time which may skew the data. Patient is due for update in insulin pump currently has OmniPod  this will be discontinued. Patient would like to transition to OmniPod 5 insulin pump. He currently is using a emily 14-day continuous glucose monitor. Would recommend transition to Dexcom G7 continuous glucose monitor as OmniPod 5 will shortly link with the Dexcom G7 for automation of the insulin pump. Will place order for Dexcom G7 with  and diabetes education referral to transition to continuous glucose monitor. Patient has approximately 2 to 3 months of supply left of the OmniPod  pods. Placed order for OmniPod 5 introduction kit. And patient will use up current pod supply and then transition to the OmniPod 5 if covered by insurance. Will plan to continue current regimen on OmniPod 5 insulin pump with Synjardy. Treatment regimen: Continue current regimen. Discussed risks/complications associated with uncontrolled diabetes including organ involvement, heart attack, stroke, death. Recommended referral to diabetes education. Referral placed. Advised lifestyle modifications including attention to diet including the amount and types of carbohydrates consumed and regular activity. Call for blood sugars less than 70 mg/dl or patterns over 250 mg/dl. Discussed use of CGM to collect additional blood glucose data to reveal patterns that can be used to improve glucose levels and adjust insulin regimen. Will transition to [address] [place] [address].     Discussed symptoms and treatment of hypoglycemia. Reviewed risks associated with hypoglycemia. Always carry rapid acting carbohydrates and a glucometer (a way to check your blood sugar). Recommendation for medical identification either bracelet, necklace. Routine follow up for diabetic eye and foot exams. Ordered blood work to complete prior to next visit. Follow up in 3 months. Relevant Medications    Continuous Blood Gluc Sensor (Dexcom G7 Sensor)    Continuous Blood Gluc  (Dexcom G7 ) BRANDO    Insulin Disposable Pump (Omnipod 5 G6 Intro, Gen 5,) KIT    Other Relevant Orders    Ambulatory Referral to Diabetic Education    Hemoglobin A1C    Albumin / creatinine urine ratio    Comprehensive metabolic panel    Hemoglobin A1C    Albumin / creatinine urine ratio    Comprehensive metabolic panel       Orders Placed This Encounter   Procedures    Hemoglobin A1C     Standing Status:   Future     Standing Expiration Date:   12/1/2024    Albumin / creatinine urine ratio     Standing Status:   Future     Standing Expiration Date:   6/1/2024    Comprehensive metabolic panel     This is a patient instruction: Patient fasting for 8 hours or longer recommended. Standing Status:   Future     Standing Expiration Date:   12/1/2024    Ambulatory Referral to Diabetic Education     Standing Status:   Future     Standing Expiration Date:   12/1/2024     Referral Priority:   Routine     Referral Type:   Consult - AMB     Referral Reason:   Specialty Services Required     Requested Specialty:   Diabetes Services     Number of Visits Requested:   1     Expiration Date:   12/1/2024       History of Present Illness:   Aníbal Pinedo is a 47 y.o. male with a history of type 2 diabetes with long term use of insulin. Reports complications: denies. To date on diabetic eye and diabetic foot exams. Denies recent illness or hospitalizations. Denies recent severe hypoglycemic or severe hyperglycemic episodes.   Denies symptomatic hypoglycemia including polyuria, polydipsia, and blurry vision. Denies symptomatic hypoglycemia. Denies any issues with his current regimen. Home glucose monitoring: are performed regularly with CGM. CGM Interpretation  Marleen Warnerom Messing   Device used emily 14-day  Home use   Indication: Type 2 Diabetes  Time CGM active: 66%  More than 72 hours of data was reviewed. Report to be scanned to chart. Date Range: November 18, 2023 to December 1, 2023  Analysis of data:   Average Glucose: 147 mg/dL  GMI 6.8%  CV: 21.8%  Time in Target Range: 86%  Time Above Range: 2% high 1% very high  Time Below Range: 0% low  Interpretation of data:   Patient has been struggling with battery life on insulin pump and sometimes he has difficulty with battery giving out at breakfast mealtime and pump is not available to give insulin at these times patient notices increased glucose levels postprandially as patient has not dosed insulin for this meal.    Patient is on a OmniPod  pump prescribed by endocrinology. He has been on a pump for several years. Current Problems with Pump: Has periods where he is not able to administer insulin due to battery life and pump malfunction. Current Insulin pump settings:  Basal rate: 12 AM 1 unit/h   Insulin to carb ratio: 8  Insulin sensitivity factor: 40  BG target: 120  Active Insulin time: 3.5    Type of insulin: NovoLog    Backup Plan: Reports backup supply at home  Aware that in case of malfunctioning of the pump or unexplained hyperglycemia to use basal and bolus therapy as backup which is prescribed to the patient. Also notified patient to call clinic and/or pump company if any issues or go to emergency department if signs/symptoms of DKA.         Current regimen: Insulin pump and Synjardy 12.5-1000 twice daily    Has hypertension: Taking lisinopril 5 mg daily  Has hyperlipidemia: Taking atorvastatin 20 mg daily   Denies thyroid disorder  Denies pancreatitis    Patient Active Problem List Diagnosis    Mid back pain    Diabetes mellitus ([address])    Pure hypercholesterolemia    Primary hypertension    Leukocytosis    Polycythemia    Smoker    Microalbuminuria    Class 1 obesity with body mass index (BMI) of 30.0 to 30.9 in adult    Varicose veins of leg with pain    Chronic urticaria    PONV (postoperative nausea and vomiting)    Rupture of right distal biceps tendon      Past Medical History:   Diagnosis Date    Allergic rhinitis     Colon polyp     Diabetes mellitus (HCC)     FBS 0700 6/17/22 was 210 via [address]     had hives of left shoulder after last colonoscopy 3-4 years ago (2019)-unknown case-PCP rx'd steroid, resolved in 2 days    Hyperlipidemia     Hypertension     PONV (postoperative nausea and vomiting)       Past Surgical History:   Procedure Laterality Date    COLONOSCOPY  2017    VT RINSJ RPTD BICEPS/TRICEPS TDN DSTL W/WO TDN GRF Right 8/3/2023    Procedure: REPAIR TENDON BICEPS;  Surgeon: Katya Carney DO;  Location:  MAIN OR;  Service: Orthopedics    VEIN LIGATION AND STRIPPING Bilateral       Family History   Problem Relation Age of Onset    Diabetes Father     Heart disease Father     Diabetes Brother     Diabetes unspecified Brother     Hypertension Brother      Social History     Tobacco Use    Smoking status: Every Day     Packs/day: 1.00     Years: 15.00     Total pack years: 15.00     Types: Cigarettes    Smokeless tobacco: Never    Tobacco comments:     trying to quit 9/17/20 using Chantix which was unsuccessful   Substance Use Topics    Alcohol use: No     Allergies   Allergen Reactions    Aspirin      Possible hives reaction    Crestor [Rosuvastatin] Hives    Invokana [Canagliflozin] Hives    Janumet [Sitagliptin-Metformin Hcl] Hives    Sitagliptin Hives         Current Outpatient Medications:     Aspirin Low Dose 81 MG chewable tablet, CHEW 1 TABLET BY MOUTH TWICE A DAY, Disp: 180 tablet, Rfl: 0    atorvastatin (LIPITOR) 20 mg tablet, Take 1 tablet (20 mg total) by mouth daily, Disp: 90 tablet, Rfl: 3    b complex vitamins capsule, Take 1 capsule by mouth daily, Disp: , Rfl:     Continuous Blood Gluc  (Dexcom G7 ) BRANDO, Use 1 each continuous, Disp: 1 each, Rfl: 0    Continuous Blood Gluc Sensor (Dexcom G7 Sensor), Use 1 Device every 10 days, Disp: 3 each, Rfl: 2    Insulin Disposable Pump (Omnipod 5 G6 Intro, Gen 5,) KIT, Change every 3 days as directed, Disp: 6 kit, Rfl: 1    lisinopril (ZESTRIL) 5 mg tablet, Take 1 tablet (5 mg total) by mouth daily, Disp: 90 tablet, Rfl: 3    multivitamin (THERAGRAN) TABS, Take 1 tablet by mouth daily. , Disp: , Rfl:     NovoLOG 100 UNIT/ML injection, USE 60U VIA PUMP DAILY, Disp: 50 mL, Rfl: 3    omalizumab (XOLAIR) subcutaneous injection, Inject 2 mL (300 mg total) under the skin every 8 weeks, Disp: 2 mL, Rfl: 11    phentermine (ADIPEX-P) 37.5 MG tablet, Take 1 tablet (37.5 mg total) by mouth every morning, Disp: 30 tablet, Rfl: 1    Synjardy XR 12.5-1000 MG TB24, TAKE ONE TABLET (125-1000 MG) TWO TIMES A DAY WITH MEALS, Disp: 180 tablet, Rfl: 3    tadalafil (CIALIS) 5 MG tablet, Take 1 tablet (5 mg total) by mouth in the morning, Disp: 90 tablet, Rfl: 1    Coenzyme Q10 10 MG capsule, Take 10 mg by mouth daily (Patient not taking: Reported on 12/1/2023), Disp: , Rfl:     EPINEPHrine (EPIPEN) 0.3 mg/0.3 mL SOAJ, EpiPen 2-Neeraj 0.3 mg/0.3 mL injection, auto-injector, Disp: , Rfl:     Omalizumab (Kavitha Gricelda SC), Inject under the skin every 30 (thirty) days  (Patient not taking: Reported on 12/1/2023), Disp: , Rfl:     Review of Systems  See HPI  All other systems reviewed and are negative. Physical Exam:  Body mass index is 30.86 kg/m².   /62 (BP Location: Left arm, Patient Position: Sitting, Cuff Size: Large)   Pulse 92   Ht 5' 9" (1.753 m)   Wt 94.8 kg (209 lb)   SpO2 95%   BMI 30.86 kg/m²    Wt Readings from Last 3 Encounters:   12/01/23 94.8 kg (209 lb)   10/27/23 94.8 kg (209 lb)   10/03/23 94.8 kg (209 lb)        Physical Exam  Vitals reviewed. Constitutional:       Appearance: Normal appearance. Cardiovascular:      Rate and Rhythm: Normal rate and regular rhythm. Pulses: Normal pulses. Heart sounds: Normal heart sounds. Pulmonary:      Effort: Pulmonary effort is normal.      Breath sounds: Normal breath sounds. Skin:     General: Skin is warm and dry. Capillary Refill: Capillary refill takes less than 2 seconds. Neurological:      General: No focal deficit present. Mental Status: He is alert and oriented to person, place, and time. Psychiatric:         Mood and Affect: Mood normal.         Behavior: Behavior normal.       Labs:   Lab Results   Component Value Date    HGBA1C 7.6 (H) 11/30/2023    HGBA1C 7.3 (H) 08/30/2023    HGBA1C 6.9 (H) 05/30/2023     Lab Results   Component Value Date    CREATININE 0.80 11/30/2023    CREATININE 0.73 08/30/2023    CREATININE 0.85 05/30/2023    BUN 16 11/30/2023    K 4.4 11/30/2023     11/30/2023    CO2 25 11/30/2023     eGFR   Date Value Ref Range Status   11/30/2023 101 ml/min/1.73sq m Final     Lab Results   Component Value Date    HDL 33 (L) 11/30/2023    TRIG 146 11/30/2023     Lab Results   Component Value Date    ALT 28 11/30/2023    AST 25 11/30/2023    ALKPHOS 75 11/30/2023     Lab Results   Component Value Date    NGE9OCXDBWKB 1.745 05/30/2023    NQQ8QNMTMCYF 1.940 09/26/2022    ZCZ6XUDYTZVG 1.650 06/03/2020     Lab Results   Component Value Date    FREET4 1.19 09/02/2017       Discussed with the patient and all questioned fully answered. He will call me if any problems arise. Follow-up appointment in 3 months.      Counseled patient on diagnostic results, prognosis, risk and benefit of treatment options, instruction for management, importance of treatment compliance, Risk  factor reduction and impressions      SHAMIR Lewis

## 2023-12-04 DIAGNOSIS — Z79.4 TYPE 2 DIABETES MELLITUS WITH OTHER SPECIFIED COMPLICATION, WITH LONG-TERM CURRENT USE OF INSULIN (HCC): ICD-10-CM

## 2023-12-04 DIAGNOSIS — E11.69 TYPE 2 DIABETES MELLITUS WITH OTHER SPECIFIED COMPLICATION, WITH LONG-TERM CURRENT USE OF INSULIN (HCC): ICD-10-CM

## 2023-12-06 RX ORDER — INSULIN PMP CART,AUT,G6/7,CNTR
EACH SUBCUTANEOUS
Qty: 1 KIT | Refills: 1 | Status: SHIPPED | OUTPATIENT
Start: 2023-12-06

## 2023-12-11 ENCOUNTER — TELEPHONE (OUTPATIENT)
Dept: ENDOCRINOLOGY | Facility: CLINIC | Age: 53
End: 2023-12-11

## 2023-12-11 NOTE — TELEPHONE ENCOUNTER
Joya Armendariz (Schroeder: [id number]) - [id number]  Omnipod 5 G6 Pod (Gen 5)  Status: PA RequestCreated: December 11th, 2023Sent: December 11th, 2023  Open    Prior auth filed awaiting response

## 2023-12-13 NOTE — TELEPHONE ENCOUNTER
Patients spouse called asking about update of pre auth.  Called patient back to let them know pre Nette Corrales was approved in cover my meds

## 2023-12-18 ENCOUNTER — TELEPHONE (OUTPATIENT)
Dept: DIABETES SERVICES | Facility: CLINIC | Age: 53
End: 2023-12-18

## 2023-12-18 NOTE — TELEPHONE ENCOUNTER
Pt wife called to schedule conversion from Omnipod BKP203 to OP5. Pt is currently using Yoly. They were ordered Dexcom G7 by [employer].     Reached out to [employer] rep to check her availability for training

## 2023-12-21 ENCOUNTER — TELEPHONE (OUTPATIENT)
Dept: DIABETES SERVICES | Facility: CLINIC | Age: 53
End: 2023-12-21

## 2023-12-21 NOTE — TELEPHONE ENCOUNTER
I understand the G7 is not compatible with the Omnipod 5 at this time. If the patient would like to continue with emily that is fine.

## 2023-12-21 NOTE — TELEPHONE ENCOUNTER
Pt was referred for Omnipod 5 and Dexcom G7 training. Dexcom G7 is not compatible with Omnipod 5 and per Omnipod rep there is no timeframe for integration.     Do you want patient trained on Omnipod 5 w/ Dexcom 7 as this would be manual mode or do you want to order Dexcom G6 (patient already picked up G7 sensors so not sure if insurance will cover) but we could try if that is your decision. Pt's wife said if it will have to be in manual mode they would prefer to just keep using Yoly.     If you would like him to use Dexcom G6 for automode, pt's wife said they would like the ability to use the Dexcom G6 sensor on his arm as opposed to his stomach. He will not use it on his stomach. If this is not allowed they will keep using Yoly     Pt is scheduled for Omnipod training on 1/3

## 2023-12-21 NOTE — TELEPHONE ENCOUNTER
Ok. I will be emailing you pump start order for the 1/3 training. Please complete and return to me by 1/3. Thanks

## 2023-12-25 DIAGNOSIS — E11.69 TYPE 2 DIABETES MELLITUS WITH OTHER SPECIFIED COMPLICATION, WITH LONG-TERM CURRENT USE OF INSULIN (HCC): ICD-10-CM

## 2023-12-25 DIAGNOSIS — Z79.4 TYPE 2 DIABETES MELLITUS WITH OTHER SPECIFIED COMPLICATION, WITH LONG-TERM CURRENT USE OF INSULIN (HCC): ICD-10-CM

## 2023-12-26 RX ORDER — INSULIN ASPART 100 [IU]/ML
INJECTION, SOLUTION INTRAVENOUS; SUBCUTANEOUS
Qty: 50 ML | Refills: 3 | Status: SHIPPED | OUTPATIENT
Start: 2023-12-26

## 2024-01-03 DIAGNOSIS — Z79.4 TYPE 2 DIABETES MELLITUS WITH HYPERGLYCEMIA, WITH LONG-TERM CURRENT USE OF INSULIN (HCC): ICD-10-CM

## 2024-01-03 DIAGNOSIS — E11.65 TYPE 2 DIABETES MELLITUS WITH HYPERGLYCEMIA, WITH LONG-TERM CURRENT USE OF INSULIN (HCC): ICD-10-CM

## 2024-01-03 RX ORDER — ACYCLOVIR 400 MG/1
1 TABLET ORAL
Qty: 9 EACH | Refills: 1 | Status: SHIPPED | OUTPATIENT
Start: 2024-01-03

## 2024-01-03 RX ORDER — ACYCLOVIR 400 MG/1
1 TABLET ORAL CONTINUOUS
Qty: 1 EACH | Refills: 0 | Status: SHIPPED | OUTPATIENT
Start: 2024-01-03

## 2024-01-05 DIAGNOSIS — Z79.4 TYPE 2 DIABETES MELLITUS WITH HYPERGLYCEMIA, WITH LONG-TERM CURRENT USE OF INSULIN (HCC): Primary | ICD-10-CM

## 2024-01-05 DIAGNOSIS — E11.65 TYPE 2 DIABETES MELLITUS WITH HYPERGLYCEMIA, WITH LONG-TERM CURRENT USE OF INSULIN (HCC): Primary | ICD-10-CM

## 2024-01-05 RX ORDER — INSULIN PMP CART,AUT,G6/7,CNTR
EACH SUBCUTANEOUS
Qty: 2 EACH | Refills: 11 | Status: SHIPPED | OUTPATIENT
Start: 2024-01-05

## 2024-01-24 ENCOUNTER — OFFICE VISIT (OUTPATIENT)
Dept: FAMILY MEDICINE CLINIC | Facility: CLINIC | Age: 54
End: 2024-01-24
Payer: COMMERCIAL

## 2024-01-24 VITALS
WEIGHT: 208 LBS | HEART RATE: 74 BPM | RESPIRATION RATE: 16 BRPM | DIASTOLIC BLOOD PRESSURE: 68 MMHG | HEIGHT: 69 IN | OXYGEN SATURATION: 97 % | TEMPERATURE: 97.7 F | BODY MASS INDEX: 30.81 KG/M2 | SYSTOLIC BLOOD PRESSURE: 128 MMHG

## 2024-01-24 DIAGNOSIS — J45.21 MILD INTERMITTENT REACTIVE AIRWAY DISEASE WITH ACUTE EXACERBATION: ICD-10-CM

## 2024-01-24 DIAGNOSIS — R09.82 PND (POST-NASAL DRIP): ICD-10-CM

## 2024-01-24 DIAGNOSIS — R05.8 DRY COUGH: ICD-10-CM

## 2024-01-24 DIAGNOSIS — R06.2 WHEEZING: ICD-10-CM

## 2024-01-24 DIAGNOSIS — E11.69 TYPE 2 DIABETES MELLITUS WITH OTHER SPECIFIED COMPLICATION, WITH LONG-TERM CURRENT USE OF INSULIN (HCC): ICD-10-CM

## 2024-01-24 DIAGNOSIS — Z79.4 TYPE 2 DIABETES MELLITUS WITH OTHER SPECIFIED COMPLICATION, WITH LONG-TERM CURRENT USE OF INSULIN (HCC): ICD-10-CM

## 2024-01-24 DIAGNOSIS — R07.89 CHEST TIGHTNESS: Primary | ICD-10-CM

## 2024-01-24 DIAGNOSIS — R09.81 NASAL CONGESTION: ICD-10-CM

## 2024-01-24 PROCEDURE — 99214 OFFICE O/P EST MOD 30 MIN: CPT | Performed by: FAMILY MEDICINE

## 2024-01-24 RX ORDER — ALBUTEROL SULFATE 90 UG/1
2 AEROSOL, METERED RESPIRATORY (INHALATION) EVERY 4 HOURS PRN
Qty: 18 G | Refills: 0 | Status: SHIPPED | OUTPATIENT
Start: 2024-01-24

## 2024-01-24 RX ORDER — AZELASTINE 1 MG/ML
1 SPRAY, METERED NASAL 2 TIMES DAILY
Qty: 30 ML | Refills: 0 | Status: SHIPPED | OUTPATIENT
Start: 2024-01-24

## 2024-01-24 RX ORDER — BENZONATATE 100 MG/1
100 CAPSULE ORAL 3 TIMES DAILY PRN
Qty: 30 CAPSULE | Refills: 0 | Status: SHIPPED | OUTPATIENT
Start: 2024-01-24

## 2024-01-24 NOTE — PROGRESS NOTES
Outpatient Note- Follow up     HPI:     Marleen Blevins , 53 y.o. male  presents today for Upper respiratory infection.  The patient started having symptoms about 3-4 days ago.  It started with rhinorrhea, PND, and congestion.  He started to having increased sinus pressure and pain.  He has been having increased dry cough with occasional sputum production.  He has had some mid sputum color change to a yellowish color.  Patient is a smoking with 15 pack year history. This happens every year.  He also has severe allergies that is currently being treated with Xolair.  He is trying to space out the shots so it also may be that the shot is wearing off.     Past Medical History:   Diagnosis Date    Allergic rhinitis     Colon polyp     Diabetes mellitus (HCC)     FBS 0700 6/17/22 was 210 via Freestyle Yoly    Hives     had hives of left shoulder after last colonoscopy 3-4 years ago (2019)-unknown case-PCP rx'd steroid, resolved in 2 days    Hyperlipidemia     Hypertension     PONV (postoperative nausea and vomiting)       ROS:   Review of Systems   Constitutional:  Negative for chills and fever.   HENT:  Positive for congestion, postnasal drip, rhinorrhea, sinus pressure and sinus pain. Negative for ear discharge, ear pain, hearing loss and sore throat.    Eyes:  Positive for discharge. Negative for pain, itching and visual disturbance.   Respiratory:  Positive for cough. Negative for chest tightness and wheezing.    Gastrointestinal:  Negative for abdominal pain, constipation, diarrhea, nausea and vomiting.   Neurological:  Positive for headaches. Negative for dizziness and light-headedness.      OBJECTIVE  Vitals:    01/24/24 1358   BP: 128/68   Pulse: 74   Resp: 16   Temp: 97.7 °F (36.5 °C)   SpO2: 97%        Physical Exam  Constitutional:       General: He is not in acute distress.     Appearance: Normal appearance. He is obese. He is not ill-appearing, toxic-appearing or diaphoretic.   HENT:      Head: Normocephalic and  atraumatic.      Comments: Mild pressure in maxillary sinuses     Right Ear: Tympanic membrane, ear canal and external ear normal.      Left Ear: Tympanic membrane, ear canal and external ear normal.      Ears:      Comments: Serous effusion bilateral, non purulent     Nose: Congestion and rhinorrhea present.      Comments: Enlarged turbinates. Red, large, boggy     Mouth/Throat:      Mouth: Mucous membranes are moist.      Pharynx: Oropharynx is clear. No oropharyngeal exudate or posterior oropharyngeal erythema.   Eyes:      General:         Right eye: No discharge.         Left eye: No discharge.      Pupils: Pupils are equal, round, and reactive to light.      Comments: Scleral injection   Cardiovascular:      Rate and Rhythm: Normal rate and regular rhythm.      Heart sounds: Normal heart sounds. No murmur heard.     No friction rub. No gallop.   Pulmonary:      Effort: Pulmonary effort is normal. Prolonged expiration present. No accessory muscle usage or respiratory distress.      Breath sounds: Decreased air movement and transmitted upper airway sounds present. Examination of the right-upper field reveals wheezing. Examination of the left-upper field reveals wheezing. Wheezing present.   Abdominal:      General: Bowel sounds are normal. There is no distension.      Palpations: Abdomen is soft.      Tenderness: There is no abdominal tenderness.   Skin:     General: Skin is warm.      Capillary Refill: Capillary refill takes less than 2 seconds.   Neurological:      Mental Status: He is alert.            ASSESSMENT AND PLAN   Marleen was seen today for cold like symptoms and cough.    Diagnoses and all orders for this visit:    Chest tightness  Mild intermittent reactive airway disease with acute exacerbation  Wheezing  Dry cough  PND (post-nasal drip)  Nasal congestion  Type 2 diabetes mellitus with other specified complication, with long-term current use of insulin (HCC)  Patient presents with increased chest  tightness and allergy symptoms.  He has issues with allergies and is on injection, Xolair.  Due to the patient's nasal congestion, itchy eyes, and PND we started Astelin nasal spray.  He may also may want to review with specialist administering Xolair since it may be wearing off.  Additionally tessalon ordered to help reduct cough and to improve sleep.  Lastly due to his wheezing and tightness in the chest, I recommended start albuterol every 4-6 hours. Since patient has type II diabetes, although moderately controlled, I am hesitant to use steroids to help with his symptoms due to increased sugars associated with prednisone.  This will need to be followed up with PCP if he continues to have symptoms/ worsen.   -     benzonatate (TESSALON PERLES) 100 mg capsule; Take 1 capsule (100 mg total) by mouth 3 (three) times a day as needed for cough  -     albuterol (Proventil HFA) 90 mcg/act inhaler; Inhale 2 puffs every 4 (four) hours as needed for wheezing           DO [facility]  1/24/2024 2:32 PM

## 2024-01-25 ENCOUNTER — NURSE TRIAGE (OUTPATIENT)
Age: 54
End: 2024-01-25

## 2024-01-25 DIAGNOSIS — Z20.818 EXPOSURE TO PERTUSSIS: Primary | ICD-10-CM

## 2024-01-25 RX ORDER — AZITHROMYCIN 250 MG/1
TABLET, FILM COATED ORAL DAILY
Qty: 6 TABLET | Refills: 0 | Status: SHIPPED | OUTPATIENT
Start: 2024-01-25 | End: 2024-01-30

## 2024-01-25 NOTE — TELEPHONE ENCOUNTER
"Reason for Disposition   [1] Whooping cough EXPOSURE < 22 days ago AND [2] NO cough or difficulty breathing    Answer Assessment - Initial Assessment Questions  1. TYPE of CONTACT: \"How much contact was there?\" (e.g., live in same house, work in same office)      Patients  daughter goes to  and one of the students in the class tested positive for whooping cough   2. DATE of CONTACT: \"When did you have contact with the patient?\" (e.g., days)      Daughter goes to pre school and father takes child to school  3. SYMPTOMS: \"Do you have any symptoms?\" \"Any cough?\"       Cough for over a month    Protocols used: Whooping Cough Exposure-ADULT-AH    "

## 2024-02-20 ENCOUNTER — TELEPHONE (OUTPATIENT)
Dept: ENDOCRINOLOGY | Facility: CLINIC | Age: 54
End: 2024-02-20

## 2024-02-20 DIAGNOSIS — E11.65 TYPE 2 DIABETES MELLITUS WITH HYPERGLYCEMIA, WITH LONG-TERM CURRENT USE OF INSULIN (HCC): ICD-10-CM

## 2024-02-20 DIAGNOSIS — Z79.4 TYPE 2 DIABETES MELLITUS WITH HYPERGLYCEMIA, WITH LONG-TERM CURRENT USE OF INSULIN (HCC): ICD-10-CM

## 2024-02-20 RX ORDER — ACYCLOVIR 400 MG/1
1 TABLET ORAL
Qty: 9 EACH | Refills: 3 | Status: SHIPPED | OUTPATIENT
Start: 2024-02-20 | End: 2024-02-21

## 2024-02-21 DIAGNOSIS — E11.65 TYPE 2 DIABETES MELLITUS WITH HYPERGLYCEMIA, WITH LONG-TERM CURRENT USE OF INSULIN (HCC): ICD-10-CM

## 2024-02-21 DIAGNOSIS — Z79.4 TYPE 2 DIABETES MELLITUS WITH HYPERGLYCEMIA, WITH LONG-TERM CURRENT USE OF INSULIN (HCC): ICD-10-CM

## 2024-02-21 RX ORDER — INSULIN PMP CART,AUT,G6/7,CNTR
EACH SUBCUTANEOUS
Qty: 6 EACH | Refills: 6 | Status: SHIPPED | OUTPATIENT
Start: 2024-02-21 | End: 2024-02-23 | Stop reason: SDUPTHER

## 2024-02-22 DIAGNOSIS — Z79.4 TYPE 2 DIABETES MELLITUS WITH OTHER SPECIFIED COMPLICATION, WITH LONG-TERM CURRENT USE OF INSULIN (HCC): Primary | ICD-10-CM

## 2024-02-22 DIAGNOSIS — E11.69 TYPE 2 DIABETES MELLITUS WITH OTHER SPECIFIED COMPLICATION, WITH LONG-TERM CURRENT USE OF INSULIN (HCC): Primary | ICD-10-CM

## 2024-02-22 RX ORDER — FLASH GLUCOSE SENSOR
KIT MISCELLANEOUS
Qty: 6 EACH | Refills: 5 | Status: SHIPPED | OUTPATIENT
Start: 2024-02-22

## 2024-02-23 DIAGNOSIS — E11.65 TYPE 2 DIABETES MELLITUS WITH HYPERGLYCEMIA, WITH LONG-TERM CURRENT USE OF INSULIN (HCC): ICD-10-CM

## 2024-02-23 DIAGNOSIS — Z79.4 TYPE 2 DIABETES MELLITUS WITH HYPERGLYCEMIA, WITH LONG-TERM CURRENT USE OF INSULIN (HCC): ICD-10-CM

## 2024-02-23 RX ORDER — INSULIN PMP CART,AUT,G6/7,CNTR
EACH SUBCUTANEOUS
Qty: 6 EACH | Refills: 0 | Status: SHIPPED | OUTPATIENT
Start: 2024-02-23

## 2024-03-04 ENCOUNTER — OFFICE VISIT (OUTPATIENT)
Dept: ENDOCRINOLOGY | Facility: CLINIC | Age: 54
End: 2024-03-04
Payer: COMMERCIAL

## 2024-03-04 ENCOUNTER — APPOINTMENT (OUTPATIENT)
Dept: LAB | Facility: CLINIC | Age: 54
End: 2024-03-04
Payer: COMMERCIAL

## 2024-03-04 VITALS
BODY MASS INDEX: 31.4 KG/M2 | OXYGEN SATURATION: 98 % | HEART RATE: 80 BPM | WEIGHT: 212 LBS | HEIGHT: 69 IN | SYSTOLIC BLOOD PRESSURE: 120 MMHG | DIASTOLIC BLOOD PRESSURE: 80 MMHG

## 2024-03-04 DIAGNOSIS — E11.65 TYPE 2 DIABETES MELLITUS WITH HYPERGLYCEMIA, WITH LONG-TERM CURRENT USE OF INSULIN (HCC): ICD-10-CM

## 2024-03-04 DIAGNOSIS — E78.00 PURE HYPERCHOLESTEROLEMIA: ICD-10-CM

## 2024-03-04 DIAGNOSIS — Z79.4 TYPE 2 DIABETES MELLITUS WITH HYPERGLYCEMIA, WITH LONG-TERM CURRENT USE OF INSULIN (HCC): ICD-10-CM

## 2024-03-04 DIAGNOSIS — Z79.4 TYPE 2 DIABETES MELLITUS WITH OTHER SPECIFIED COMPLICATION, WITH LONG-TERM CURRENT USE OF INSULIN (HCC): ICD-10-CM

## 2024-03-04 DIAGNOSIS — I10 PRIMARY HYPERTENSION: ICD-10-CM

## 2024-03-04 DIAGNOSIS — E11.69 TYPE 2 DIABETES MELLITUS WITH OTHER SPECIFIED COMPLICATION, WITH LONG-TERM CURRENT USE OF INSULIN (HCC): Primary | ICD-10-CM

## 2024-03-04 DIAGNOSIS — R80.9 MICROALBUMINURIA: ICD-10-CM

## 2024-03-04 DIAGNOSIS — E11.69 TYPE 2 DIABETES MELLITUS WITH OTHER SPECIFIED COMPLICATION, WITH LONG-TERM CURRENT USE OF INSULIN (HCC): ICD-10-CM

## 2024-03-04 DIAGNOSIS — Z79.4 TYPE 2 DIABETES MELLITUS WITH OTHER SPECIFIED COMPLICATION, WITH LONG-TERM CURRENT USE OF INSULIN (HCC): Primary | ICD-10-CM

## 2024-03-04 LAB
ALBUMIN SERPL BCP-MCNC: 4.5 G/DL (ref 3.5–5)
ALP SERPL-CCNC: 72 U/L (ref 34–104)
ALT SERPL W P-5'-P-CCNC: 35 U/L (ref 7–52)
ANION GAP SERPL CALCULATED.3IONS-SCNC: 13 MMOL/L
AST SERPL W P-5'-P-CCNC: 27 U/L (ref 13–39)
BILIRUB SERPL-MCNC: 0.58 MG/DL (ref 0.2–1)
BUN SERPL-MCNC: 16 MG/DL (ref 5–25)
CALCIUM SERPL-MCNC: 9.4 MG/DL (ref 8.4–10.2)
CHLORIDE SERPL-SCNC: 104 MMOL/L (ref 96–108)
CO2 SERPL-SCNC: 21 MMOL/L (ref 21–32)
CREAT SERPL-MCNC: 0.78 MG/DL (ref 0.6–1.3)
CREAT UR-MCNC: 71 MG/DL
GFR SERPL CREATININE-BSD FRML MDRD: 102 ML/MIN/1.73SQ M
GLUCOSE P FAST SERPL-MCNC: 132 MG/DL (ref 65–99)
MICROALBUMIN UR-MCNC: 21.1 MG/L
MICROALBUMIN/CREAT 24H UR: 30 MG/G CREATININE (ref 0–30)
POTASSIUM SERPL-SCNC: 4.3 MMOL/L (ref 3.5–5.3)
PROT SERPL-MCNC: 7 G/DL (ref 6.4–8.4)
SODIUM SERPL-SCNC: 138 MMOL/L (ref 135–147)

## 2024-03-04 PROCEDURE — 36415 COLL VENOUS BLD VENIPUNCTURE: CPT

## 2024-03-04 PROCEDURE — 83036 HEMOGLOBIN GLYCOSYLATED A1C: CPT

## 2024-03-04 PROCEDURE — 80053 COMPREHEN METABOLIC PANEL: CPT

## 2024-03-04 PROCEDURE — 82570 ASSAY OF URINE CREATININE: CPT

## 2024-03-04 PROCEDURE — 95251 CONT GLUC MNTR ANALYSIS I&R: CPT | Performed by: NURSE PRACTITIONER

## 2024-03-04 PROCEDURE — 99214 OFFICE O/P EST MOD 30 MIN: CPT | Performed by: NURSE PRACTITIONER

## 2024-03-04 PROCEDURE — 82043 UR ALBUMIN QUANTITATIVE: CPT

## 2024-03-04 NOTE — ASSESSMENT & PLAN NOTE
Lab Results   Component Value Date    HGBA1C 7.6 (H) 11/30/2023     HGA1C close to goal.    BGL Reviewed:Continues on emily 14 CGM, will hold on transition until G7 or emily sensor is compatible for automation.     Treatment regimen: Continue current settings.     Discussed risks/complications associated with uncontrolled diabetes including organ involvement, heart attack, stroke, death.    Advised lifestyle modifications including attention to diet including the amount and types of carbohydrates consumed and regular activity.     Call for blood sugars less than 70 mg/dl or patterns over 250 mg/dl.     Discussed symptoms and treatment of hypoglycemia.  Reviewed risks associated with hypoglycemia. Always carry rapid acting carbohydrates and a glucometer (a way to check your blood sugar).    Recommendation for medical identification either bracelet, necklace.    Recommendation for glucagon if on insulin.     Routine follow up for diabetic eye and foot exams.     Ordered blood work to complete prior to next visit.    Send glucose logs/CGM download in 1-2 weeks for review    Follow up in 3 months.

## 2024-03-04 NOTE — PROGRESS NOTES
Established Patient Progress Note    Chief Complaint:  Diabetes follow up visit    Impression & Plan:    Problem List Items Addressed This Visit          Endocrine    Type 2 diabetes mellitus with other specified complication, with long-term current use of insulin ([facility]) - Primary       Lab Results   Component Value Date    HGBA1C 7.6 (H) 11/30/2023     HGA1C close to goal.    BGL Reviewed:Continues on emily 14 CGM, will hold on transition until G7 or emily sensor is compatible for automation.     Treatment regimen: Continue current settings.     Discussed risks/complications associated with uncontrolled diabetes including organ involvement, heart attack, stroke, death.    Advised lifestyle modifications including attention to diet including the amount and types of carbohydrates consumed and regular activity.     Call for blood sugars less than 70 mg/dl or patterns over 250 mg/dl.     Discussed symptoms and treatment of hypoglycemia.  Reviewed risks associated with hypoglycemia. Always carry rapid acting carbohydrates and a glucometer (a way to check your blood sugar).    Recommendation for medical identification either bracelet, necklace.    Recommendation for glucagon if on insulin.     Routine follow up for diabetic eye and foot exams.     Ordered blood work to complete prior to next visit.    Send glucose logs/CGM download in 1-2 weeks for review    Follow up in 3 months.           Relevant Orders    Hemoglobin A1C    Basic metabolic panel    TSH, 3rd generation with Free T4 reflex       Cardiovascular and Mediastinum    Primary hypertension     BP well controlled.   Continues on ACE-inhibitor.             Other    Pure hypercholesterolemia     Continues on statin for now. Recheck lipid panel in 3 months.          Relevant Orders    Lipid panel    Microalbuminuria     Microalbumin/albumin creatinine ratio pending.             History of Present Illness:   Marleen Blevins is a 53 y.o. male with a history of type 2  diabetes with long term use of insulin. Reports complications: denies.  UTD on diabetic eye exam.  Denies recent illness or hospitalizations. Denies recent severe hypoglycemic or severe hyperglycemic episodes.  Denies symptomatic hypoglycemia including polyuria, polydipsia, and blurry vision.  Denies symptomatic hypoglycemia.  Denies any issues with his current regimen-- has seen improvement with transition to OP5 insulin pump. Home glucose monitoring: are performed regularly with CGM. Declines foot exam today.    CGM Interpretation  Marleen Blevins   Device used Freestyle emily for Personal Use  Indication: Type of Diabetes: Type 2 Diabetes  More than 72 hours of data was reviewed. Report to be scanned to chart.   Date Range: 2/20/2024-3/4/2024  Analysis of data:   Average Glucose: 145 mg/dl  Coefficient of Variation: 19.7%  GMI: 6.8%  Time in Target Range: 91%  Time Above Range: 9%  Time Below Range: 0%   Interpretation of data:   Day-to-day variation on dietary choices.       Patient is on a OmniPod 5 insulin pump pump prescribed by endocrinology. He has been on a pump since last appointment.   Current Problems with Pump: does not integrate. Currently using emily 14-day sensor.      Current Insulin pump settings:  Basal rate: 12 AM 1 unit/h        Insulin to carb ratio: 8  Insulin sensitivity factor: 40  BG target: 120  Active Insulin time: 3.5     Type of insulin: NovoLog     Backup Plan: Reports backup supply at home  Aware that in case of malfunctioning of the pump or unexplained hyperglycemia to use basal and bolus therapy as backup which is prescribed to the patient. Also notified patient to call clinic and/or pump company if any issues or go to emergency department if signs/symptoms of DKA.         Current regimen: Insulin pump and Synjardy 12.5-1000 twice daily    Denies side effect of treatment.      Has hypertension: Taking lisinopril 5 mg daily, compliant  Has hyperlipidemia: Taking atorvastatin 20 mg daily,  tolerating  Denies thyroid disorder  Denies pancreatitis    Patient Active Problem List   Diagnosis    Mid back pain    Type 2 diabetes mellitus with other specified complication, with long-term current use of insulin (HCC)    Pure hypercholesterolemia    Primary hypertension    Leukocytosis    Polycythemia    Smoker    Microalbuminuria    Class 1 obesity with body mass index (BMI) of 30.0 to 30.9 in adult    Varicose veins of leg with pain    Chronic urticaria    PONV (postoperative nausea and vomiting)    Rupture of right distal biceps tendon      Past Medical History:   Diagnosis Date    Allergic rhinitis     Colon polyp     Diabetes mellitus (HCC)     FBS 0700 6/17/22 was 210 via Freestyle Yoly    Hivmalini     had hives of left shoulder after last colonoscopy 3-4 years ago (2019)-unknown case-PCP rx'd steroid, resolved in 2 days    Hyperlipidemia     Hypertension     PONV (postoperative nausea and vomiting)       Past Surgical History:   Procedure Laterality Date    COLONOSCOPY  2017    OK RINSJ RPTD BICEPS/TRICEPS TDN DSTL W/WO TDN GRF Right 8/3/2023    Procedure: REPAIR TENDON BICEPS;  Surgeon: Darnell Wick DO;  Location:  MAIN OR;  Service: Orthopedics    VEIN LIGATION AND STRIPPING Bilateral       Family History   Problem Relation Age of Onset    Diabetes Father     Heart disease Father     No Known Problems Brother     Diabetes type II Brother     Hypertension Brother     Stroke Brother     No Known Problems Daughter     No Known Problems Daughter      Social History     Tobacco Use    Smoking status: Every Day     Current packs/day: 1.00     Average packs/day: 1 pack/day for 15.0 years (15.0 ttl pk-yrs)     Types: Cigarettes    Smokeless tobacco: Never    Tobacco comments:     trying to quit 9/17/20 using Chantix which was unsuccessful   Substance Use Topics    Alcohol use: No     Allergies   Allergen Reactions    Aspirin      Possible hives reaction    Crestor [Rosuvastatin] Hives    Invokana  [Canagliflozin] Hives    Janumet [Sitagliptin-Metformin Hcl] Hives    Sitagliptin Hives         Current Outpatient Medications:     Aspirin Low Dose 81 MG chewable tablet, CHEW 1 TABLET BY MOUTH TWICE A DAY, Disp: 180 tablet, Rfl: 0    atorvastatin (LIPITOR) 20 mg tablet, Take 1 tablet (20 mg total) by mouth daily, Disp: 90 tablet, Rfl: 3    b complex vitamins capsule, Take 1 capsule by mouth daily, Disp: , Rfl:     Continuous Blood Gluc Sensor (FreeStyle Yoly 14 Day Sensor) MISC, Use 1 sensor every 2 weeks, Disp: 6 each, Rfl: 5    Insulin Disposable Pump (Omnipod 5 G6 Pod, Gen 5,) MISC, Change every 72 hours as directed, Disp: 6 each, Rfl: 0    lisinopril (ZESTRIL) 5 mg tablet, Take 1 tablet (5 mg total) by mouth daily, Disp: 90 tablet, Rfl: 3    multivitamin (THERAGRAN) TABS, Take 1 tablet by mouth daily., Disp: , Rfl:     NovoLOG 100 UNIT/ML injection, USE 60U VIA PUMP DAILY, Disp: 50 mL, Rfl: 3    omalizumab (XOLAIR) subcutaneous injection, Inject 2 mL (300 mg total) under the skin every 8 weeks, Disp: 2 mL, Rfl: 11    phentermine (ADIPEX-P) 37.5 MG tablet, Take 1 tablet (37.5 mg total) by mouth every morning, Disp: 30 tablet, Rfl: 1    Synjardy XR 12.5-1000 MG TB24, TAKE ONE TABLET (125-1000 MG) TWO TIMES A DAY WITH MEALS, Disp: 180 tablet, Rfl: 3    albuterol (Proventil HFA) 90 mcg/act inhaler, Inhale 2 puffs every 4 (four) hours as needed for wheezing, Disp: 18 g, Rfl: 0    azelastine (ASTELIN) 0.1 % nasal spray, 1 spray into each nostril 2 (two) times a day Use in each nostril as directed (Patient not taking: Reported on 3/4/2024), Disp: 30 mL, Rfl: 0    benzonatate (TESSALON PERLES) 100 mg capsule, Take 1 capsule (100 mg total) by mouth 3 (three) times a day as needed for cough (Patient not taking: Reported on 3/4/2024), Disp: 30 capsule, Rfl: 0    Coenzyme Q10 10 MG capsule, Take 10 mg by mouth daily (Patient not taking: Reported on 12/1/2023), Disp: , Rfl:     EPINEPHrine (EPIPEN) 0.3 mg/0.3 mL SOAJ,  "EpiPen 2-Neeraj 0.3 mg/0.3 mL injection, auto-injector, Disp: , Rfl:     Omalizumab (XOLAIR SC), Inject under the skin every 30 (thirty) days  (Patient not taking: Reported on 12/1/2023), Disp: , Rfl:     tadalafil (CIALIS) 5 MG tablet, Take 1 tablet (5 mg total) by mouth in the morning, Disp: 90 tablet, Rfl: 1    Review of Systems  Constitutional: Negative for activity change, appetite change, fatigue and unexpected weight change.   HENT: Negative for ear pain, sore throat, trouble swallowing and voice change.    Eyes: Negative for visual disturbance.   Respiratory: Negative for cough and shortness of breath.    Cardiovascular: Negative for chest pain and palpitations.   Gastrointestinal: Negative for abdominal distention, abdominal pain, constipation, diarrhea and vomiting.   Endocrine: Negative for cold intolerance, heat intolerance, polydipsia and polyuria.   Genitourinary: Negative for dysuria and hematuria.   Musculoskeletal: Negative for arthralgias and back pain.   Skin: Negative for color change and rash.   Neurological: Negative for weakness or tremors.   All other systems reviewed and are negative.      Physical Exam:  Body mass index is 31.31 kg/m².  /80 (BP Location: Left arm, Patient Position: Sitting, Cuff Size: Large)   Pulse 80   Ht 5' 9\" (1.753 m)   Wt 96.2 kg (212 lb)   SpO2 98%   BMI 31.31 kg/m²    Wt Readings from Last 3 Encounters:   03/04/24 96.2 kg (212 lb)   01/24/24 94.3 kg (208 lb)   12/01/23 94.8 kg (209 lb)       Physical Exam   Constitutional: He is oriented to person, place, and time. He appears well-developed and well-nourished. No distress.   HENT:   Head: Normocephalic and atraumatic.   Neck: Normal range of motion.   Pulmonary/Chest: Effort normal.   Musculoskeletal: Normal range of motion.   Neurological: He is alert and oriented to person, place, and time.   Skin: He is not diaphoretic.   Psychiatric: He has a normal mood and affect. His behavior is normal.       Labs: "   Lab Results   Component Value Date    HGBA1C 7.6 (H) 11/30/2023    HGBA1C 7.3 (H) 08/30/2023    HGBA1C 6.9 (H) 05/30/2023     Lab Results   Component Value Date    CREATININE 0.80 11/30/2023    CREATININE 0.73 08/30/2023    CREATININE 0.85 05/30/2023    BUN 16 11/30/2023    K 4.4 11/30/2023     11/30/2023    CO2 25 11/30/2023     eGFR   Date Value Ref Range Status   11/30/2023 101 ml/min/1.73sq m Final     Lab Results   Component Value Date    HDL 33 (L) 11/30/2023    TRIG 146 11/30/2023     Lab Results   Component Value Date    ALT 28 11/30/2023    AST 25 11/30/2023    ALKPHOS 75 11/30/2023     Lab Results   Component Value Date    KEQ5UUMGDYQF 1.745 05/30/2023    WAG9BRIFDAPM 1.940 09/26/2022    AHM3NDKZGLHE 1.650 06/03/2020     Lab Results   Component Value Date    FREET4 1.19 09/02/2017       Discussed with the patient and all questioned fully answered. He will call me if any problems arise.    Follow-up appointment in 3 months.     Counseled patient on diagnostic results, prognosis, risk and benefit of treatment options, instruction for management, importance of treatment compliance, Risk  factor reduction and impressions    SHAMIR Andres

## 2024-03-05 LAB
EST. AVERAGE GLUCOSE BLD GHB EST-MCNC: 163 MG/DL
HBA1C MFR BLD: 7.3 %

## 2024-03-29 ENCOUNTER — RA CDI HCC (OUTPATIENT)
Dept: OTHER | Facility: HOSPITAL | Age: 54
End: 2024-03-29

## 2024-03-29 NOTE — PROGRESS NOTES
HCC coding opportunities          Chart Reviewed number of suggestions sent to Provider: 2   E11.29  Z79.4      Patients Insurance        Commercial Insurance: [employer]

## 2024-04-05 ENCOUNTER — OFFICE VISIT (OUTPATIENT)
Dept: FAMILY MEDICINE CLINIC | Facility: CLINIC | Age: 54
End: 2024-04-05
Payer: COMMERCIAL

## 2024-04-05 VITALS
OXYGEN SATURATION: 97 % | HEIGHT: 69 IN | DIASTOLIC BLOOD PRESSURE: 70 MMHG | SYSTOLIC BLOOD PRESSURE: 110 MMHG | WEIGHT: 214 LBS | BODY MASS INDEX: 31.7 KG/M2 | HEART RATE: 97 BPM | RESPIRATION RATE: 16 BRPM

## 2024-04-05 DIAGNOSIS — N52.9 ERECTILE DYSFUNCTION, UNSPECIFIED ERECTILE DYSFUNCTION TYPE: ICD-10-CM

## 2024-04-05 DIAGNOSIS — R79.89 LOW TESTOSTERONE: ICD-10-CM

## 2024-04-05 DIAGNOSIS — I10 PRIMARY HYPERTENSION: ICD-10-CM

## 2024-04-05 DIAGNOSIS — S46.211D RUPTURE OF RIGHT DISTAL BICEPS TENDON, SUBSEQUENT ENCOUNTER: ICD-10-CM

## 2024-04-05 DIAGNOSIS — E78.00 PURE HYPERCHOLESTEROLEMIA: ICD-10-CM

## 2024-04-05 DIAGNOSIS — Z79.4 TYPE 2 DIABETES MELLITUS WITH OTHER SPECIFIED COMPLICATION, WITH LONG-TERM CURRENT USE OF INSULIN (HCC): ICD-10-CM

## 2024-04-05 DIAGNOSIS — E66.9 CLASS 1 OBESITY WITH BODY MASS INDEX (BMI) OF 30.0 TO 30.9 IN ADULT, UNSPECIFIED OBESITY TYPE, UNSPECIFIED WHETHER SERIOUS COMORBIDITY PRESENT: ICD-10-CM

## 2024-04-05 DIAGNOSIS — Z12.5 SCREENING FOR PROSTATE CANCER: Primary | ICD-10-CM

## 2024-04-05 DIAGNOSIS — E53.8 B12 DEFICIENCY: ICD-10-CM

## 2024-04-05 DIAGNOSIS — J43.9 PULMONARY EMPHYSEMA, UNSPECIFIED EMPHYSEMA TYPE (HCC): ICD-10-CM

## 2024-04-05 DIAGNOSIS — E55.9 VITAMIN D DEFICIENCY: ICD-10-CM

## 2024-04-05 DIAGNOSIS — F17.200 SMOKER: ICD-10-CM

## 2024-04-05 DIAGNOSIS — E11.69 TYPE 2 DIABETES MELLITUS WITH OTHER SPECIFIED COMPLICATION, WITH LONG-TERM CURRENT USE OF INSULIN (HCC): ICD-10-CM

## 2024-04-05 DIAGNOSIS — R53.83 FATIGUE, UNSPECIFIED TYPE: ICD-10-CM

## 2024-04-05 DIAGNOSIS — E61.1 IRON DEFICIENCY: ICD-10-CM

## 2024-04-05 PROCEDURE — 99214 OFFICE O/P EST MOD 30 MIN: CPT | Performed by: FAMILY MEDICINE

## 2024-04-05 RX ORDER — TADALAFIL 5 MG/1
5 TABLET ORAL DAILY
Qty: 90 TABLET | Refills: 1 | Status: SHIPPED | OUTPATIENT
Start: 2024-04-05

## 2024-04-05 RX ORDER — TADALAFIL 5 MG/1
5 TABLET ORAL DAILY
Qty: 90 TABLET | Refills: 1 | Status: SHIPPED | OUTPATIENT
Start: 2024-04-05 | End: 2024-04-05
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
## 2024-04-30 ENCOUNTER — HOSPITAL ENCOUNTER (OUTPATIENT)
Dept: PULMONOLOGY | Facility: HOSPITAL | Age: 54
Discharge: HOME | End: 2024-04-30
Payer: COMMERCIAL

## 2024-04-30 DIAGNOSIS — J43.9 PULMONARY EMPHYSEMA, UNSPECIFIED EMPHYSEMA TYPE (HCC): ICD-10-CM

## 2024-04-30 PROCEDURE — 94726 PLETHYSMOGRAPHY LUNG VOLUMES: CPT

## 2024-04-30 PROCEDURE — 94060 EVALUATION OF WHEEZING: CPT | Performed by: INTERNAL MEDICINE

## 2024-04-30 PROCEDURE — 94729 DIFFUSING CAPACITY: CPT | Performed by: INTERNAL MEDICINE

## 2024-04-30 PROCEDURE — 94729 DIFFUSING CAPACITY: CPT

## 2024-04-30 PROCEDURE — 94060 EVALUATION OF WHEEZING: CPT

## 2024-04-30 PROCEDURE — 94760 N-INVAS EAR/PLS OXIMETRY 1: CPT

## 2024-04-30 PROCEDURE — 94726 PLETHYSMOGRAPHY LUNG VOLUMES: CPT | Performed by: INTERNAL MEDICINE

## 2024-04-30 RX ORDER — ALBUTEROL SULFATE 2.5 MG/3ML
2.5 SOLUTION RESPIRATORY (INHALATION) ONCE
Status: COMPLETED | OUTPATIENT
Start: 2024-04-30 | End: 2024-04-30

## 2024-04-30 RX ADMIN — ALBUTEROL SULFATE 2.5 MG: 2.5 SOLUTION RESPIRATORY (INHALATION) at 12:50

## 2024-05-07 DIAGNOSIS — E11.65 TYPE 2 DIABETES MELLITUS WITH HYPERGLYCEMIA, WITH LONG-TERM CURRENT USE OF INSULIN (HCC): ICD-10-CM

## 2024-05-07 DIAGNOSIS — Z79.4 TYPE 2 DIABETES MELLITUS WITH HYPERGLYCEMIA, WITH LONG-TERM CURRENT USE OF INSULIN (HCC): ICD-10-CM

## 2024-05-07 RX ORDER — INSULIN PMP CART,AUT,G6/7,CNTR
EACH SUBCUTANEOUS
Qty: 30 EACH | Refills: 1 | Status: SHIPPED | OUTPATIENT
Start: 2024-05-07

## 2024-05-07 NOTE — TELEPHONE ENCOUNTER
Wife of the patient was looking for the actual pods for the pump. She states that it came together last time.     She would also like it to be a 90 day supply if it can be.

## 2024-05-18 DIAGNOSIS — E11.69 TYPE 2 DIABETES MELLITUS WITH OTHER SPECIFIED COMPLICATION, WITH LONG-TERM CURRENT USE OF INSULIN (HCC): ICD-10-CM

## 2024-05-18 DIAGNOSIS — Z79.4 TYPE 2 DIABETES MELLITUS WITH OTHER SPECIFIED COMPLICATION, WITH LONG-TERM CURRENT USE OF INSULIN (HCC): ICD-10-CM

## 2024-05-18 RX ORDER — EMPAGLIFLOZIN, METFORMIN HYDROCHLORIDE 12.5; 1 MG/1; MG/1
TABLET, EXTENDED RELEASE ORAL
Qty: 180 TABLET | Refills: 1 | Status: SHIPPED | OUTPATIENT
Start: 2024-05-18

## 2024-05-29 DIAGNOSIS — Z00.6 ENCOUNTER FOR EXAMINATION FOR NORMAL COMPARISON OR CONTROL IN CLINICAL RESEARCH PROGRAM: ICD-10-CM

## 2024-06-04 ENCOUNTER — APPOINTMENT (OUTPATIENT)
Dept: LAB | Facility: CLINIC | Age: 54
End: 2024-06-04
Payer: COMMERCIAL

## 2024-06-04 DIAGNOSIS — E53.8 B12 DEFICIENCY: ICD-10-CM

## 2024-06-04 DIAGNOSIS — Z79.4 TYPE 2 DIABETES MELLITUS WITH OTHER SPECIFIED COMPLICATION, WITH LONG-TERM CURRENT USE OF INSULIN (HCC): ICD-10-CM

## 2024-06-04 DIAGNOSIS — Z00.6 ENCOUNTER FOR EXAMINATION FOR NORMAL COMPARISON OR CONTROL IN CLINICAL RESEARCH PROGRAM: ICD-10-CM

## 2024-06-04 DIAGNOSIS — Z12.5 SCREENING FOR PROSTATE CANCER: ICD-10-CM

## 2024-06-04 DIAGNOSIS — E11.69 TYPE 2 DIABETES MELLITUS WITH OTHER SPECIFIED COMPLICATION, WITH LONG-TERM CURRENT USE OF INSULIN (HCC): ICD-10-CM

## 2024-06-04 DIAGNOSIS — E61.1 IRON DEFICIENCY: ICD-10-CM

## 2024-06-04 DIAGNOSIS — E55.9 VITAMIN D DEFICIENCY: ICD-10-CM

## 2024-06-04 DIAGNOSIS — R79.89 LOW TESTOSTERONE: ICD-10-CM

## 2024-06-04 DIAGNOSIS — E78.00 PURE HYPERCHOLESTEROLEMIA: ICD-10-CM

## 2024-06-04 DIAGNOSIS — R53.83 FATIGUE, UNSPECIFIED TYPE: ICD-10-CM

## 2024-06-04 LAB
ANION GAP SERPL CALCULATED.3IONS-SCNC: 13 MMOL/L (ref 4–13)
BUN SERPL-MCNC: 17 MG/DL (ref 5–25)
CALCIUM SERPL-MCNC: 9.1 MG/DL (ref 8.4–10.2)
CHLORIDE SERPL-SCNC: 104 MMOL/L (ref 96–108)
CHOLEST SERPL-MCNC: 128 MG/DL
CO2 SERPL-SCNC: 23 MMOL/L (ref 21–32)
CREAT SERPL-MCNC: 0.78 MG/DL (ref 0.6–1.3)
EST. AVERAGE GLUCOSE BLD GHB EST-MCNC: 169 MG/DL
GFR SERPL CREATININE-BSD FRML MDRD: 102 ML/MIN/1.73SQ M
GLUCOSE P FAST SERPL-MCNC: 166 MG/DL (ref 65–99)
HBA1C MFR BLD: 7.5 %
HDLC SERPL-MCNC: 34 MG/DL
LDLC SERPL CALC-MCNC: 71 MG/DL (ref 0–100)
NONHDLC SERPL-MCNC: 94 MG/DL
POTASSIUM SERPL-SCNC: 4.3 MMOL/L (ref 3.5–5.3)
SODIUM SERPL-SCNC: 140 MMOL/L (ref 135–147)
TRIGL SERPL-MCNC: 114 MG/DL
TSH SERPL DL<=0.05 MIU/L-ACNC: 2.46 UIU/ML (ref 0.45–4.5)

## 2024-06-04 PROCEDURE — 83036 HEMOGLOBIN GLYCOSYLATED A1C: CPT

## 2024-06-04 PROCEDURE — 80061 LIPID PANEL: CPT

## 2024-06-04 PROCEDURE — 36415 COLL VENOUS BLD VENIPUNCTURE: CPT

## 2024-06-04 PROCEDURE — 80048 BASIC METABOLIC PNL TOTAL CA: CPT

## 2024-06-04 PROCEDURE — 84443 ASSAY THYROID STIM HORMONE: CPT

## 2024-06-05 ENCOUNTER — TELEPHONE (OUTPATIENT)
Age: 54
End: 2024-06-05

## 2024-06-05 NOTE — TELEPHONE ENCOUNTER
Patient needs new order for thigh-high compression stockings. Insurance requires new pharmacy:    [facility]  Fax # [phone]

## 2024-06-06 ENCOUNTER — TELEPHONE (OUTPATIENT)
Dept: ENDOCRINOLOGY | Facility: CLINIC | Age: 54
End: 2024-06-06

## 2024-06-10 ENCOUNTER — TELEPHONE (OUTPATIENT)
Dept: ENDOCRINOLOGY | Facility: CLINIC | Age: 54
End: 2024-06-10

## 2024-06-10 NOTE — TELEPHONE ENCOUNTER
Offering appt. today at 11:30 with Priscilla Burton  If still available please advise office of patient accepting.

## 2024-06-20 LAB
APOB+LDLR+PCSK9 GENE MUT ANL BLD/T: NOT DETECTED
BRCA1+BRCA2 DEL+DUP + FULL MUT ANL BLD/T: NOT DETECTED
MLH1+MSH2+MSH6+PMS2 GN DEL+DUP+FUL M: NOT DETECTED

## 2024-07-09 ENCOUNTER — TELEPHONE (OUTPATIENT)
Dept: ENDOCRINOLOGY | Facility: CLINIC | Age: 54
End: 2024-07-09

## 2024-07-09 NOTE — TELEPHONE ENCOUNTER
Called to offer 7- appointment if still available. Patient is scheduled in August with Priscilla Burton.

## 2024-08-06 ENCOUNTER — OFFICE VISIT (OUTPATIENT)
Dept: ENDOCRINOLOGY | Facility: CLINIC | Age: 54
End: 2024-08-06
Payer: COMMERCIAL

## 2024-08-06 VITALS
WEIGHT: 213.8 LBS | OXYGEN SATURATION: 96 % | SYSTOLIC BLOOD PRESSURE: 131 MMHG | HEART RATE: 93 BPM | DIASTOLIC BLOOD PRESSURE: 61 MMHG | BODY MASS INDEX: 31.67 KG/M2 | HEIGHT: 69 IN

## 2024-08-06 DIAGNOSIS — E78.00 PURE HYPERCHOLESTEROLEMIA: ICD-10-CM

## 2024-08-06 DIAGNOSIS — Z79.4 TYPE 2 DIABETES MELLITUS WITH OTHER SPECIFIED COMPLICATION, WITH LONG-TERM CURRENT USE OF INSULIN (HCC): Primary | ICD-10-CM

## 2024-08-06 DIAGNOSIS — I10 PRIMARY HYPERTENSION: ICD-10-CM

## 2024-08-06 DIAGNOSIS — E11.69 TYPE 2 DIABETES MELLITUS WITH OTHER SPECIFIED COMPLICATION, WITH LONG-TERM CURRENT USE OF INSULIN (HCC): Primary | ICD-10-CM

## 2024-08-06 PROCEDURE — 99214 OFFICE O/P EST MOD 30 MIN: CPT | Performed by: NURSE PRACTITIONER

## 2024-08-06 RX ORDER — INSULIN ASPART 100 [IU]/ML
INJECTION, SOLUTION INTRAVENOUS; SUBCUTANEOUS
Qty: 50 ML | Refills: 3 | Status: SHIPPED | OUTPATIENT
Start: 2024-08-06

## 2024-08-06 RX ORDER — ZINC GLUCONATE 50 MG
50 TABLET ORAL DAILY
COMMUNITY

## 2024-08-06 NOTE — PROGRESS NOTES
Established Patient Progress Note    Chief Complaint:  Diabetes follow up visit    Impression & Plan:    Problem List Items Addressed This Visit          Cardiovascular and Mediastinum    Primary hypertension     BP reasonable control continues on ACE/ARB.             Endocrine    Type 2 diabetes mellitus with other specified complication, with long-term current use of insulin (HCC) - Primary       Lab Results   Component Value Date    HGBA1C 7.5 (H) 06/04/2024     HGA1C above goal no CGM for review today. Blood sugars entered into the pump indicate good control.     BGL Reviewed: Will start Dexcom G7.    Treatment regimen: Continue current regimen. Will reach out to [employer] regarding pods compatible with G7 sensor.    Discussed risks/complications associated with uncontrolled diabetes including organ involvement, heart attack, stroke, death.    Advised lifestyle modifications including attention to diet including the amount and types of carbohydrates consumed and regular activity.     Call for blood sugars less than 70 mg/dl or patterns over 250 mg/dl.     Discussed symptoms and treatment of hypoglycemia.  Reviewed risks associated with hypoglycemia. Always carry rapid acting carbohydrates and a glucometer (a way to check your blood sugar).    Recommendation for medical identification either bracelet, necklace.    Routine follow up for diabetic eye and foot exams.     Ordered blood work to complete prior to next visit.    Send glucose logs/CGM download in 1-2 weeks for review once on G7 sensor.     Follow up in 3 months.            Relevant Medications    NovoLOG 100 UNIT/ML injection    Other Relevant Orders    Hemoglobin A1C    Comprehensive metabolic panel       Other    Pure hypercholesterolemia     Continue statin.  Will continue to work on lifestyle modification.             History of Present Illness:   Marleen Blevins is a 53 y.o. male with a history of type 2 diabetes with long term use of insulin without  complication.     Denies recent illness or hospitalizations.     Denies recent severe hypoglycemic or severe hyperglycemic episodes requiring medical attention.      Denies symptomatic hypoglycemia including polyuria, polydipsia, and blurry vision.      Denies symptomatic hypoglycemia.      Home glucose monitoring: are performed regularly with emily 3 CGM. He has dexcom G7 and will be starting this sensor.      Patient is on a OmniPod 5 insulin pump pump prescribed by endocrinology. Current Problems with Pump: does not integrate. He has Dexcom G7 but is awaiting [employer] 5 pods that are compatible with G7 sensor.      Current Insulin pump settings:  Basal rate: 12 AM 1 unit/h        Insulin to carb ratio: 8  Insulin sensitivity factor: 40  BG target: 120  Active Insulin time: 3.5     Type of insulin: NovoLog     Backup Plan: Reports backup supply at home  Aware that in case of malfunctioning of the pump or unexplained hyperglycemia to use basal and bolus therapy as backup which is prescribed to the patient. Also notified patient to call clinic and/or pump company if any issues or go to emergency department if signs/symptoms of DKA.         Current regimen: Insulin pump and Synjardy 12.5-1000 twice daily with meals     Denies side effect of treatment.      Has hypertension: Continues taking lisinopril 5 mg daily, compliant  Has hyperlipidemia: Continues taking atorvastatin 20 mg daily, tolerating  Denies history of thyroid disorder  Denies history of pancreatitis    Patient Active Problem List   Diagnosis    Mid back pain    Type 2 diabetes mellitus with other specified complication, with long-term current use of insulin (HCC)    Pure hypercholesterolemia    Primary hypertension    Leukocytosis    Polycythemia    Smoker    Microalbuminuria    Class 1 obesity with body mass index (BMI) of 30.0 to 30.9 in adult    Varicose veins of leg with pain    Chronic urticaria    Rupture of right distal biceps tendon      Past  Medical History:   Diagnosis Date    Allergic rhinitis     Colon polyp     Diabetes mellitus (HCC)     FBS 0700 6/17/22 was 210 via Freestyle Yoly    Hivmalini     had hives of left shoulder after last colonoscopy 3-4 years ago (2019)-unknown case-PCP rx'd steroid, resolved in 2 days    Hyperlipidemia     Hypertension     PONV (postoperative nausea and vomiting)       Past Surgical History:   Procedure Laterality Date    COLONOSCOPY  2017    [place] RINSJ RPTD BICEPS/TRICEPS TDN DSTL W/WO TDN GRF Right 8/3/2023    Procedure: REPAIR TENDON BICEPS;  Surgeon: Darnell Wick DO;  Location: [facility];  Service: Orthopedics    VEIN LIGATION AND STRIPPING Bilateral       Family History   Problem Relation Age of Onset    Diabetes Father     Heart disease Father     No Known Problems Brother     Diabetes type II Brother     Hypertension Brother     Stroke Brother     No Known Problems Daughter     No Known Problems Daughter      Social History     Tobacco Use    Smoking status: Every Day     Current packs/day: 1.00     Average packs/day: 1 pack/day for 15.0 years (15.0 ttl pk-yrs)     Types: Cigarettes    Smokeless tobacco: Never    Tobacco comments:     trying to quit 9/17/20 using Chantix which was unsuccessful   Substance Use Topics    Alcohol use: No     Allergies   Allergen Reactions    Aspirin      Possible hives reaction    Crestor [Rosuvastatin] Hives    Invokana [Canagliflozin] Hives    Janumet [Sitagliptin-Metformin Hcl] Hives    Sitagliptin Hives         Current Outpatient Medications:     albuterol (Proventil HFA) 90 mcg/act inhaler, Inhale 2 puffs every 4 (four) hours as needed for wheezing, Disp: 18 g, Rfl: 0    atorvastatin (LIPITOR) 20 mg tablet, Take 1 tablet (20 mg total) by mouth daily, Disp: 90 tablet, Rfl: 3    azelastine (ASTELIN) 0.1 % nasal spray, 1 spray into each nostril 2 (two) times a day Use in each nostril as directed, Disp: 30 mL, Rfl: 0    b complex vitamins capsule, Take 1 capsule by mouth  daily, Disp: , Rfl:     Empagliflozin-metFORMIN HCl ER (Synjardy XR) 12.5-1000 MG TB24, TAKE ONE TABLET (125-1000 MG) TWO TIMES A DAY WITH MEALS, Disp: 180 tablet, Rfl: 1    EPINEPHrine (EPIPEN) 0.3 mg/0.3 mL SOAJ, EpiPen 2-Neeraj 0.3 mg/0.3 mL injection, auto-injector, Disp: , Rfl:     lisinopril (ZESTRIL) 5 mg tablet, Take 1 tablet (5 mg total) by mouth daily, Disp: 90 tablet, Rfl: 3    multivitamin (THERAGRAN) TABS, Take 1 tablet by mouth daily., Disp: , Rfl:     NovoLOG 100 UNIT/ML injection, Use 60 units daily via insulin pump., Disp: 50 mL, Rfl: 3    omalizumab (Xolair) subcutaneous injection, Inject 2 mL (300 mg total) under the skin every 8 weeks, Disp: 2 mL, Rfl: 11    tadalafil (CIALIS) 5 MG tablet, Take 1 tablet (5 mg total) by mouth in the morning, Disp: 90 tablet, Rfl: 1    zinc gluconate 50 mg tablet, Take 50 mg by mouth daily, Disp: , Rfl:     Aspirin Low Dose 81 MG chewable tablet, CHEW 1 TABLET BY MOUTH TWICE A DAY (Patient not taking: Reported on 8/6/2024), Disp: 180 tablet, Rfl: 0    Continuous Blood Gluc Sensor (FreeStyle Yoly 14 Day Sensor) MISC, Use 1 sensor every 2 weeks, Disp: 6 each, Rfl: 5    Insulin Disposable Pump (Omnipod 5 G6 Pods, Gen 5,) MISC, Change every 72 hours as directed, Disp: 30 each, Rfl: 1    Review of Systems  Constitutional: Negative  HENT: Negative  Eyes: Negative for visual disturbance.   Respiratory: Negative for cough and shortness of breath.    Cardiovascular: Negative for chest pain and palpitations.   Gastrointestinal: Negative for abdominal distention, abdominal pain, constipation, diarrhea and vomiting.   Endocrine: Negative for cold intolerance, heat intolerance, polydipsia and polyuria.   Genitourinary: Negative for dysuria and hematuria. Denies genital yeast or skin infection.  Skin: Negative  Neurological: Negative  All other systems reviewed and are negative.      Physical Exam:  Body mass index is 31.57 kg/m².  /61 (BP Location: Left arm, Patient  "Position: Sitting, Cuff Size: Large)   Pulse 93   Ht 5' 9\" (1.753 m)   Wt 97 kg (213 lb 12.8 oz)   SpO2 96%   BMI 31.57 kg/m²    Wt Readings from Last 3 Encounters:   08/06/24 97 kg (213 lb 12.8 oz)   04/05/24 97.1 kg (214 lb)   03/04/24 96.2 kg (212 lb)       Physical Exam   Constitutional: He is oriented to person, place, and time. He appears well-developed and well-nourished. No distress.   HENT:   Head: Normocephalic and atraumatic.   Neck: Normal range of motion.   Pulmonary/Chest: Effort normal.   Musculoskeletal: Normal range of motion.   Neurological: He is alert and oriented to person, place, and time.   Skin: He is not diaphoretic.   Psychiatric: He has a normal mood and affect. His behavior is normal.     Labs:   Lab Results   Component Value Date    HGBA1C 7.5 (H) 06/04/2024    HGBA1C 7.3 (H) 03/04/2024    HGBA1C 7.6 (H) 11/30/2023     Lab Results   Component Value Date    CREATININE 0.78 06/04/2024    CREATININE 0.78 03/04/2024    CREATININE 0.80 11/30/2023    BUN 17 06/04/2024    K 4.3 06/04/2024     06/04/2024    CO2 23 06/04/2024     eGFR   Date Value Ref Range Status   06/04/2024 102 ml/min/1.73sq m Final     Lab Results   Component Value Date    HDL 34 (L) 06/04/2024    TRIG 114 06/04/2024     Lab Results   Component Value Date    ALT 35 03/04/2024    AST 27 03/04/2024    ALKPHOS 72 03/04/2024     Lab Results   Component Value Date    RUS2DFTIUJHU 2.457 06/04/2024    XBC8TDEBRWAM 1.745 05/30/2023    HFJ3AKTOHNXV 1.940 09/26/2022     Lab Results   Component Value Date    FREET4 1.19 09/02/2017       Discussed with the patient and all questioned fully answered. He will call me if any problems arise.    Follow-up appointment in 3 months.     Counseled patient on diagnostic results, prognosis, risk and benefit of treatment options, instruction for management, importance of treatment compliance, Risk  factor reduction and impressions    SHAMIR Andres    "

## 2024-08-06 NOTE — PATIENT INSTRUCTIONS
When you start Dexcom G7 you would download Dexcom G7 vicenta and Dexcom clarity. You'll you user name and password for both. Low alerts less than 80 mg/dL and high alert 180-200 mg/dL.

## 2024-08-09 NOTE — ASSESSMENT & PLAN NOTE
Lab Results   Component Value Date    HGBA1C 7.5 (H) 06/04/2024     HGA1C above goal no CGM for review today. Blood sugars entered into the pump indicate good control.     BGL Reviewed: Will start Dexcom G7.    Treatment regimen: Continue current regimen. Will reach out to [employer] regarding pods compatible with G7 sensor.    Discussed risks/complications associated with uncontrolled diabetes including organ involvement, heart attack, stroke, death.    Advised lifestyle modifications including attention to diet including the amount and types of carbohydrates consumed and regular activity.     Call for blood sugars less than 70 mg/dl or patterns over 250 mg/dl.     Discussed symptoms and treatment of hypoglycemia.  Reviewed risks associated with hypoglycemia. Always carry rapid acting carbohydrates and a glucometer (a way to check your blood sugar).    Recommendation for medical identification either bracelet, necklace.    Routine follow up for diabetic eye and foot exams.     Ordered blood work to complete prior to next visit.    Send glucose logs/CGM download in 1-2 weeks for review once on G7 sensor.     Follow up in 3 months.

## 2024-08-14 DIAGNOSIS — Z79.4 TYPE 2 DIABETES MELLITUS WITH OTHER SPECIFIED COMPLICATION, WITH LONG-TERM CURRENT USE OF INSULIN (HCC): Primary | ICD-10-CM

## 2024-08-14 DIAGNOSIS — E11.69 TYPE 2 DIABETES MELLITUS WITH OTHER SPECIFIED COMPLICATION, WITH LONG-TERM CURRENT USE OF INSULIN (HCC): Primary | ICD-10-CM

## 2024-08-14 RX ORDER — ACYCLOVIR 400 MG/1
1 TABLET ORAL
Qty: 9 EACH | Refills: 2 | Status: SHIPPED | OUTPATIENT
Start: 2024-08-14

## 2024-08-15 DIAGNOSIS — R80.9 MICROALBUMINURIA: ICD-10-CM

## 2024-08-15 DIAGNOSIS — I10 PRIMARY HYPERTENSION: ICD-10-CM

## 2024-08-15 DIAGNOSIS — Z79.4 TYPE 2 DIABETES MELLITUS WITH OTHER SPECIFIED COMPLICATION, WITH LONG-TERM CURRENT USE OF INSULIN (HCC): ICD-10-CM

## 2024-08-15 DIAGNOSIS — E78.00 PURE HYPERCHOLESTEROLEMIA: ICD-10-CM

## 2024-08-15 DIAGNOSIS — E11.69 TYPE 2 DIABETES MELLITUS WITH OTHER SPECIFIED COMPLICATION, WITH LONG-TERM CURRENT USE OF INSULIN (HCC): ICD-10-CM

## 2024-08-15 DIAGNOSIS — Z79.4 TYPE 2 DIABETES MELLITUS WITH HYPERGLYCEMIA, WITH LONG-TERM CURRENT USE OF INSULIN (HCC): ICD-10-CM

## 2024-08-15 DIAGNOSIS — E11.65 TYPE 2 DIABETES MELLITUS WITH HYPERGLYCEMIA, WITH LONG-TERM CURRENT USE OF INSULIN (HCC): ICD-10-CM

## 2024-08-15 DIAGNOSIS — E66.9 CLASS 1 OBESITY WITH BODY MASS INDEX (BMI) OF 30.0 TO 30.9 IN ADULT, UNSPECIFIED OBESITY TYPE, UNSPECIFIED WHETHER SERIOUS COMORBIDITY PRESENT: ICD-10-CM

## 2024-08-15 RX ORDER — LISINOPRIL 5 MG/1
5 TABLET ORAL DAILY
Qty: 90 TABLET | Refills: 1 | Status: SHIPPED | OUTPATIENT
Start: 2024-08-15

## 2024-10-09 ENCOUNTER — OFFICE VISIT (OUTPATIENT)
Dept: FAMILY MEDICINE CLINIC | Facility: CLINIC | Age: 54
End: 2024-10-09

## 2024-10-09 VITALS
HEART RATE: 86 BPM | OXYGEN SATURATION: 97 % | SYSTOLIC BLOOD PRESSURE: 136 MMHG | WEIGHT: 213 LBS | RESPIRATION RATE: 16 BRPM | HEIGHT: 69 IN | DIASTOLIC BLOOD PRESSURE: 72 MMHG | BODY MASS INDEX: 31.55 KG/M2

## 2024-10-09 DIAGNOSIS — E66.811 CLASS 1 OBESITY DUE TO EXCESS CALORIES WITH SERIOUS COMORBIDITY AND BODY MASS INDEX (BMI) OF 31.0 TO 31.9 IN ADULT: ICD-10-CM

## 2024-10-09 DIAGNOSIS — Z79.4 TYPE 2 DIABETES MELLITUS WITH OTHER SPECIFIED COMPLICATION, WITH LONG-TERM CURRENT USE OF INSULIN (HCC): ICD-10-CM

## 2024-10-09 DIAGNOSIS — E11.69 TYPE 2 DIABETES MELLITUS WITH OTHER SPECIFIED COMPLICATION, WITH LONG-TERM CURRENT USE OF INSULIN (HCC): ICD-10-CM

## 2024-10-09 DIAGNOSIS — Z00.00 WELL ADULT EXAM: ICD-10-CM

## 2024-10-09 DIAGNOSIS — D75.1 POLYCYTHEMIA: ICD-10-CM

## 2024-10-09 DIAGNOSIS — N52.9 ERECTILE DYSFUNCTION, UNSPECIFIED ERECTILE DYSFUNCTION TYPE: ICD-10-CM

## 2024-10-09 DIAGNOSIS — F17.200 SMOKER: ICD-10-CM

## 2024-10-09 DIAGNOSIS — E66.09 CLASS 1 OBESITY DUE TO EXCESS CALORIES WITH SERIOUS COMORBIDITY AND BODY MASS INDEX (BMI) OF 31.0 TO 31.9 IN ADULT: ICD-10-CM

## 2024-10-09 DIAGNOSIS — Z23 NEED FOR VACCINATION: Primary | ICD-10-CM

## 2024-10-09 DIAGNOSIS — I10 PRIMARY HYPERTENSION: ICD-10-CM

## 2024-10-09 DIAGNOSIS — L50.8 CHRONIC URTICARIA: ICD-10-CM

## 2024-10-09 PROBLEM — M54.9 MID BACK PAIN: Status: RESOLVED | Noted: 2020-06-22 | Resolved: 2024-10-09

## 2024-10-09 PROBLEM — S46.211A RUPTURE OF RIGHT DISTAL BICEPS TENDON: Status: RESOLVED | Noted: 2023-08-16 | Resolved: 2024-10-09

## 2024-10-09 RX ORDER — TADALAFIL 5 MG/1
5 TABLET ORAL DAILY
Qty: 90 TABLET | Refills: 3 | Status: SHIPPED | OUTPATIENT
Start: 2024-10-09

## 2024-10-09 RX ORDER — ASPIRIN 81 MG/1
81 TABLET, CHEWABLE ORAL DAILY
Qty: 180 TABLET | Refills: 0 | Status: SHIPPED | OUTPATIENT
Start: 2024-10-09

## 2024-10-09 NOTE — PROGRESS NOTES
Ambulatory Visit  Name: Marleen Blevins      : 1970      MRN: [id number]  Encounter Provider: Cortez De Leon MD  Encounter Date: 10/9/2024   Encounter department: [facility]    Assessment & Plan  Need for vaccination    Orders:    influenza vaccine, recombinant, PF, 0.5 mL IM (Flublok)    aspirin (Aspirin Low Dose) 81 mg chewable tablet; Chew 1 tablet (81 mg total) daily    Well adult exam         Class 1 obesity due to excess calories with serious comorbidity and body mass index (BMI) of 31.0 to 31.9 in adult           Primary hypertension         Type 2 diabetes mellitus with other specified complication, with long-term current use of insulin ([facility])    Lab Results   Component Value Date    HGBA1C 7.5 (H) 2024            Chronic urticaria         Smoker         Polycythemia    Orders:    CBC and differential    Erectile dysfunction, unspecified erectile dysfunction type    Orders:    tadalafil (CIALIS) 5 MG tablet; Take 1 tablet (5 mg total) by mouth in the morning       Assessment & Plan  1. Diabetes Mellitus.  Her A1C remains stable with an average glucose level of 147 and 87% of the time within the target range. She is currently using a Dexcom CGM and is on NovoLog via pump and Synjardy twice a day (morning and night). She will continue her current medication regimen. Blood tests will be conducted prior to her next appointment with Dr. Priscilla España.     2. Hypertension.  Her blood pressure readings have been in the 130s range, which is higher than desired. She is advised to monitor her blood pressure daily for the next two weeks, varying the time of day (morning, afternoon, evening), and to record the readings. She should then provide these readings for review. If the readings remain elevated, adjustments to her medication may be considered.    3. Allergies.  She reports that her allergies are well-controlled with Xolair, with no recent reactions. She will continue with her current  "allergy management plan.    4. Medication Management.  A prescription for tadalafil has been refilled and sent to her pharmacy, [facility].    5. Health Maintenance.  An influenza vaccine will be administered today.    Follow-up  Return in 2 weeks for blood pressure review.        History of Present Illness     History of Present Illness  The patient presents for a follow-up visit.    He reports a stable weight and is making efforts to reduce his smoking habit. He has been managing his allergies effectively with Xolair, which he takes every 3 to 5 months. His blood sugar level is currently at 121, and he continues to use the NovoLog pump and Synjardy twice daily. He also takes aspirin. He has never received an influenza vaccine. He monitors his blood pressure at home using a personal device. He has undergone genetic testing through [employer] [facility]. He is seeking a refill of tadalafil.     Review of Systems   Constitutional:  Negative for activity change, appetite change and fever.   HENT:  Negative for congestion, nosebleeds and trouble swallowing.    Eyes:  Negative for itching.   Respiratory:  Negative for cough and chest tightness.    Cardiovascular:  Negative for chest pain and palpitations.   Gastrointestinal:  Negative for abdominal pain, constipation, diarrhea and nausea.   Endocrine: Negative for cold intolerance.   Genitourinary:  Negative for frequency.   Musculoskeletal:  Negative for gait problem and joint swelling.   Skin:  Negative for rash.   Allergic/Immunologic: Negative for immunocompromised state.   Neurological:  Negative for dizziness, tremors, seizures, syncope and headaches.   Psychiatric/Behavioral:  Negative for hallucinations and suicidal ideas.      Objective     /72   Pulse 86   Resp 16   Ht 5' 9\" (1.753 m)   Wt 96.6 kg (213 lb)   SpO2 97%   BMI 31.45 kg/m²     Physical Exam    Physical Exam  Vitals and nursing note reviewed.   Constitutional:       Appearance: He is " well-developed. He is obese.   HENT:      Head: Normocephalic and atraumatic.   Eyes:      Conjunctiva/sclera: Conjunctivae normal.      Pupils: Pupils are equal, round, and reactive to light.   Cardiovascular:      Rate and Rhythm: Normal rate and regular rhythm.      Heart sounds: Normal heart sounds.   Pulmonary:      Effort: Pulmonary effort is normal.      Breath sounds: Normal breath sounds. No wheezing or rales.   Abdominal:      General: Bowel sounds are normal. There is no distension.      Palpations: Abdomen is soft.      Tenderness: There is no abdominal tenderness.   Musculoskeletal:         General: No tenderness. Normal range of motion.      Cervical back: Normal range of motion and neck supple.   Skin:     General: Skin is warm and dry.      Findings: No rash.   Neurological:      Mental Status: He is alert and oriented to person, place, and time.      Cranial Nerves: No cranial nerve deficit.      Sensory: No sensory deficit.      Coordination: Coordination normal.   Psychiatric:         Behavior: Behavior normal.         Thought Content: Thought content normal.         Judgment: Judgment normal.

## 2024-10-15 LAB
LEFT EYE DIABETIC RETINOPATHY: NORMAL
RIGHT EYE DIABETIC RETINOPATHY: NORMAL

## 2024-10-20 DIAGNOSIS — Z79.4 TYPE 2 DIABETES MELLITUS WITH HYPERGLYCEMIA, WITH LONG-TERM CURRENT USE OF INSULIN (HCC): ICD-10-CM

## 2024-10-20 DIAGNOSIS — E11.65 TYPE 2 DIABETES MELLITUS WITH HYPERGLYCEMIA, WITH LONG-TERM CURRENT USE OF INSULIN (HCC): ICD-10-CM

## 2024-10-21 RX ORDER — INSULIN PMP CART,AUT,G6/7,CNTR
EACH SUBCUTANEOUS
Qty: 30 EACH | Refills: 1 | Status: SHIPPED | OUTPATIENT
Start: 2024-10-21

## 2024-11-06 DIAGNOSIS — E78.00 PURE HYPERCHOLESTEROLEMIA: ICD-10-CM

## 2024-11-06 DIAGNOSIS — Z79.4 TYPE 2 DIABETES MELLITUS WITH HYPERGLYCEMIA, WITH LONG-TERM CURRENT USE OF INSULIN (HCC): ICD-10-CM

## 2024-11-06 DIAGNOSIS — R80.9 MICROALBUMINURIA: ICD-10-CM

## 2024-11-06 DIAGNOSIS — E66.811 CLASS 1 OBESITY WITH BODY MASS INDEX (BMI) OF 30.0 TO 30.9 IN ADULT, UNSPECIFIED OBESITY TYPE, UNSPECIFIED WHETHER SERIOUS COMORBIDITY PRESENT: ICD-10-CM

## 2024-11-06 DIAGNOSIS — I10 PRIMARY HYPERTENSION: ICD-10-CM

## 2024-11-06 DIAGNOSIS — E11.65 TYPE 2 DIABETES MELLITUS WITH HYPERGLYCEMIA, WITH LONG-TERM CURRENT USE OF INSULIN (HCC): ICD-10-CM

## 2024-11-06 DIAGNOSIS — Z79.4 TYPE 2 DIABETES MELLITUS WITH OTHER SPECIFIED COMPLICATION, WITH LONG-TERM CURRENT USE OF INSULIN (HCC): ICD-10-CM

## 2024-11-06 DIAGNOSIS — E11.69 TYPE 2 DIABETES MELLITUS WITH OTHER SPECIFIED COMPLICATION, WITH LONG-TERM CURRENT USE OF INSULIN (HCC): ICD-10-CM

## 2024-11-06 RX ORDER — ATORVASTATIN CALCIUM 20 MG/1
20 TABLET, FILM COATED ORAL DAILY
Qty: 90 TABLET | Refills: 1 | Status: SHIPPED | OUTPATIENT
Start: 2024-11-06

## 2024-11-17 DIAGNOSIS — E11.69 TYPE 2 DIABETES MELLITUS WITH OTHER SPECIFIED COMPLICATION, WITH LONG-TERM CURRENT USE OF INSULIN (HCC): ICD-10-CM

## 2024-11-17 DIAGNOSIS — Z79.4 TYPE 2 DIABETES MELLITUS WITH OTHER SPECIFIED COMPLICATION, WITH LONG-TERM CURRENT USE OF INSULIN (HCC): ICD-10-CM

## 2024-11-18 ENCOUNTER — APPOINTMENT (OUTPATIENT)
Dept: LAB | Facility: CLINIC | Age: 54
End: 2024-11-18
Payer: COMMERCIAL

## 2024-11-18 ENCOUNTER — RESULTS FOLLOW-UP (OUTPATIENT)
Dept: ENDOCRINOLOGY | Facility: CLINIC | Age: 54
End: 2024-11-18

## 2024-11-18 ENCOUNTER — RESULTS FOLLOW-UP (OUTPATIENT)
Dept: FAMILY MEDICINE CLINIC | Facility: CLINIC | Age: 54
End: 2024-11-18

## 2024-11-18 DIAGNOSIS — E11.69 TYPE 2 DIABETES MELLITUS WITH OTHER SPECIFIED COMPLICATION, WITH LONG-TERM CURRENT USE OF INSULIN (HCC): ICD-10-CM

## 2024-11-18 DIAGNOSIS — Z79.4 TYPE 2 DIABETES MELLITUS WITH OTHER SPECIFIED COMPLICATION, WITH LONG-TERM CURRENT USE OF INSULIN (HCC): ICD-10-CM

## 2024-11-18 LAB
25(OH)D3 SERPL-MCNC: 22.3 NG/ML (ref 30–100)
ALBUMIN SERPL BCG-MCNC: 4.3 G/DL (ref 3.5–5)
ALP SERPL-CCNC: 67 U/L (ref 34–104)
ALT SERPL W P-5'-P-CCNC: 28 U/L (ref 7–52)
ANION GAP SERPL CALCULATED.3IONS-SCNC: 10 MMOL/L (ref 4–13)
AST SERPL W P-5'-P-CCNC: 25 U/L (ref 13–39)
BASOPHILS # BLD AUTO: 0.13 THOUSANDS/ÂΜL (ref 0–0.1)
BASOPHILS NFR BLD AUTO: 1 % (ref 0–1)
BILIRUB SERPL-MCNC: 0.44 MG/DL (ref 0.2–1)
BUN SERPL-MCNC: 17 MG/DL (ref 5–25)
CALCIUM SERPL-MCNC: 8.9 MG/DL (ref 8.4–10.2)
CHLORIDE SERPL-SCNC: 107 MMOL/L (ref 96–108)
CO2 SERPL-SCNC: 24 MMOL/L (ref 21–32)
CREAT SERPL-MCNC: 0.78 MG/DL (ref 0.6–1.3)
EOSINOPHIL # BLD AUTO: 0.63 THOUSAND/ÂΜL (ref 0–0.61)
EOSINOPHIL NFR BLD AUTO: 4 % (ref 0–6)
ERYTHROCYTE [DISTWIDTH] IN BLOOD BY AUTOMATED COUNT: 14 % (ref 11.6–15.1)
EST. AVERAGE GLUCOSE BLD GHB EST-MCNC: 174 MG/DL
FERRITIN SERPL-MCNC: 58 NG/ML (ref 24–336)
GFR SERPL CREATININE-BSD FRML MDRD: 102 ML/MIN/1.73SQ M
GLUCOSE P FAST SERPL-MCNC: 164 MG/DL (ref 65–99)
HBA1C MFR BLD: 7.7 %
HCT VFR BLD AUTO: 49 % (ref 36.5–49.3)
HGB BLD-MCNC: 16.6 G/DL (ref 12–17)
IMM GRANULOCYTES # BLD AUTO: 0.06 THOUSAND/UL (ref 0–0.2)
IMM GRANULOCYTES NFR BLD AUTO: 0 % (ref 0–2)
IRON SATN MFR SERPL: 19 % (ref 15–50)
IRON SERPL-MCNC: 66 UG/DL (ref 50–212)
LYMPHOCYTES # BLD AUTO: 4.58 THOUSANDS/ÂΜL (ref 0.6–4.47)
LYMPHOCYTES NFR BLD AUTO: 31 % (ref 14–44)
MAGNESIUM SERPL-MCNC: 2 MG/DL (ref 1.9–2.7)
MCH RBC QN AUTO: 28.4 PG (ref 26.8–34.3)
MCHC RBC AUTO-ENTMCNC: 33.9 G/DL (ref 31.4–37.4)
MCV RBC AUTO: 84 FL (ref 82–98)
MONOCYTES # BLD AUTO: 1.09 THOUSAND/ÂΜL (ref 0.17–1.22)
MONOCYTES NFR BLD AUTO: 7 % (ref 4–12)
NEUTROPHILS # BLD AUTO: 8.54 THOUSANDS/ÂΜL (ref 1.85–7.62)
NEUTS SEG NFR BLD AUTO: 57 % (ref 43–75)
NRBC BLD AUTO-RTO: 0 /100 WBCS
PLATELET # BLD AUTO: 295 THOUSANDS/UL (ref 149–390)
PMV BLD AUTO: 11.8 FL (ref 8.9–12.7)
POTASSIUM SERPL-SCNC: 4.3 MMOL/L (ref 3.5–5.3)
PROT SERPL-MCNC: 6.4 G/DL (ref 6.4–8.4)
PSA SERPL-MCNC: 0.36 NG/ML (ref 0–4)
RBC # BLD AUTO: 5.85 MILLION/UL (ref 3.88–5.62)
SODIUM SERPL-SCNC: 141 MMOL/L (ref 135–147)
TIBC SERPL-MCNC: 349 UG/DL (ref 250–450)
TSH SERPL DL<=0.05 MIU/L-ACNC: 1.57 UIU/ML (ref 0.45–4.5)
UIBC SERPL-MCNC: 283 UG/DL (ref 155–355)
VIT B12 SERPL-MCNC: 903 PG/ML (ref 180–914)
WBC # BLD AUTO: 15.03 THOUSAND/UL (ref 4.31–10.16)

## 2024-11-18 PROCEDURE — 83036 HEMOGLOBIN GLYCOSYLATED A1C: CPT

## 2024-11-18 PROCEDURE — 80053 COMPREHEN METABOLIC PANEL: CPT

## 2024-11-18 RX ORDER — EMPAGLIFLOZIN, METFORMIN HYDROCHLORIDE 12.5; 1 MG/1; MG/1
TABLET, EXTENDED RELEASE ORAL
Qty: 180 TABLET | Refills: 1 | Status: SHIPPED | OUTPATIENT
Start: 2024-11-18

## 2024-11-19 ENCOUNTER — OFFICE VISIT (OUTPATIENT)
Dept: ENDOCRINOLOGY | Facility: CLINIC | Age: 54
End: 2024-11-19
Payer: COMMERCIAL

## 2024-11-19 VITALS
OXYGEN SATURATION: 96 % | SYSTOLIC BLOOD PRESSURE: 144 MMHG | BODY MASS INDEX: 31.84 KG/M2 | WEIGHT: 215 LBS | HEART RATE: 92 BPM | DIASTOLIC BLOOD PRESSURE: 80 MMHG | HEIGHT: 69 IN

## 2024-11-19 DIAGNOSIS — Z79.4 TYPE 2 DIABETES MELLITUS WITH OTHER SPECIFIED COMPLICATION, WITH LONG-TERM CURRENT USE OF INSULIN (HCC): ICD-10-CM

## 2024-11-19 DIAGNOSIS — I10 PRIMARY HYPERTENSION: Primary | ICD-10-CM

## 2024-11-19 DIAGNOSIS — E11.69 TYPE 2 DIABETES MELLITUS WITH OTHER SPECIFIED COMPLICATION, WITH LONG-TERM CURRENT USE OF INSULIN (HCC): ICD-10-CM

## 2024-11-19 DIAGNOSIS — E55.9 VITAMIN D DEFICIENCY: ICD-10-CM

## 2024-11-19 LAB
TESTOST FREE SERPL-MCNC: 7.9 PG/ML (ref 7.2–24)
TESTOST SERPL-MCNC: 286 NG/DL (ref 264–916)

## 2024-11-19 PROCEDURE — 99204 OFFICE O/P NEW MOD 45 MIN: CPT | Performed by: NURSE PRACTITIONER

## 2024-11-19 NOTE — PROGRESS NOTES
Name: Marleen Blevins      : 1970      MRN: [id number]  Encounter Provider: SHAMIR Andres  Encounter Date: 2024   Encounter department: [facility]  :  Assessment & Plan  Type 2 diabetes mellitus with other specified complication, with long-term current use of insulin (HCC)    Lab Results   Component Value Date    HGBA1C 7.7 (H) 2024     HGA1C above goal. Blood sugars entered on insulin pump download range between 100- 165 mg/dL with no available CGM download for review. Patient will ask wife to help login to Dexcom Clarity to share CGM with office and once reviewed, will adjust regimen. Assisted patient with entry of current settings in to Omnipod 5 vicenta.     Discussed risks/complications associated with uncontrolled diabetes including organ involvement, heart attack, stroke, death.    Advised lifestyle modifications including attention to diet including the amount and types of carbohydrates consumed and regular activity.     Call for blood sugars less than 70 mg/dl or patterns over 250 mg/dl.    Discussed symptoms and treatment of hypoglycemia.  Reviewed risks associated with hypoglycemia. Always carry rapid acting carbohydrates and a glucometer (a way to check your blood sugar).    Recommendation for medical identification either bracelet, necklace.    Routine follow up for diabetic eye and foot exams.     Ordered blood work to complete prior to next visit.    Send glucose logs/CGM download in 1-2 weeks for review    Follow up in 3 months.     Orders:    Hemoglobin A1C; Future    Comprehensive metabolic panel; Future    Lipid panel; Future    Primary hypertension  BP under good control.   Continues on ACE.          Vitamin D deficiency  Vitamin D from 2024 was low at 22.3, already addressed by PCP vitamin D3 5000 IU daily x 1 month and then reduce to 2000 IU daily. Will recommend recheck vitamin D 25 hydroxy level in 3 months.    Orders:    Vitamin D 25 hydroxy; Future        History of Present Illness     HPI  Marleen Blevins is a 53 y.o. male with a history of type 2 diabetes with long term use of insulin without complication.      Denies recent illness or hospitalizations.      Denies recent severe hypoglycemic or severe hyperglycemic episodes requiring medical attention or third party assistance.       Denies changes in thirst, frequency of urination, vision, or sensation in feet.      Using Dexcom Continuous glucose monitor but unable to login to vicenta to connect to office for review and download of CGM.      Patient is on a OmniPod 5 insulin pump pump prescribed by endocrinology. Current Problems with Pump: does not integrate.      Current Insulin pump settings:  Basal rate: 12 AM 1 unit/h        Insulin to carb ratio: 8  Insulin sensitivity factor: 40  BG target: 120  Active Insulin time: 3.5     Type of insulin: Novolog     Backup Plan: Reports backup supply at home  Aware that in case of malfunctioning of the pump or unexplained hyperglycemia to use basal and bolus therapy as backup which is prescribed to the patient. Also notified patient to call clinic and/or pump company if any issues or go to emergency department if signs/symptoms of DKA.         Current regimen: Insulin pump and Synjardy 12.5-1000 twice daily with meals     Denies side effect of treatment including dysuria, genital yeast, or skin infection.      Continues on ACE and Statin.     Denies history of thyroid disorder  Denies history of pancreatitis    History obtained from: patient    Review of Systems  See HPI.   All other systems reviewed and are negative.    Medical History Reviewed by provider this encounter:     .  Current Outpatient Medications on File Prior to Visit   Medication Sig Dispense Refill    albuterol (Proventil HFA) 90 mcg/act inhaler Inhale 2 puffs every 4 (four) hours as needed for wheezing 18 g 0    aspirin (Aspirin Low Dose) 81 mg chewable tablet  "Chew 1 tablet (81 mg total) daily 180 tablet 0    atorvastatin (LIPITOR) 20 mg tablet TAKE 1 TABLET BY MOUTH EVERY DAY 90 tablet 1    azelastine (ASTELIN) 0.1 % nasal spray 1 spray into each nostril 2 (two) times a day Use in each nostril as directed 30 mL 0    b complex vitamins capsule Take 1 capsule by mouth daily      Continuous Glucose Sensor (Dexcom G7 Sensor) Use 1 Device every 10 days 9 each 2    EPINEPHrine (EPIPEN) 0.3 mg/0.3 mL SOAJ EpiPen 2-Neeraj 0.3 mg/0.3 mL injection, auto-injector      Insulin Disposable Pump (Omnipod 5 G6 Pods, Gen 5,) MISC CHANGE EVERY 72 HOURS AS DIRECTED 30 each 1    lisinopril (ZESTRIL) 5 mg tablet TAKE 1 TABLET (5 MG TOTAL) BY MOUTH DAILY. 90 tablet 1    multivitamin (THERAGRAN) TABS Take 1 tablet by mouth daily.      NovoLOG 100 UNIT/ML injection Use 60 units daily via insulin pump. 50 mL 3    omalizumab (Xolair) subcutaneous injection Inject 2 mL (300 mg total) under the skin every 8 weeks 2 mL 11    Synjardy XR 12.5-1000 MG TB24 TAKE ONE TABLET (125-1000 MG) TWO TIMES A DAY WITH MEALS 180 tablet 1    tadalafil (CIALIS) 5 MG tablet Take 1 tablet (5 mg total) by mouth in the morning 90 tablet 3    zinc gluconate 50 mg tablet Take 50 mg by mouth daily       No current facility-administered medications on file prior to visit.         Objective   /80 (BP Location: Right arm, Patient Position: Sitting, Cuff Size: Large)   Pulse 92   Ht 5' 9\" (1.753 m)   Wt 97.5 kg (215 lb)   SpO2 96%   BMI 31.75 kg/m²         Physical Exam  Vitals reviewed.   Constitutional:       Appearance: Normal appearance.   Cardiovascular:      Rate and Rhythm: Normal rate and regular rhythm.      Pulses: Normal pulses.      Heart sounds: Normal heart sounds.   Pulmonary:      Effort: Pulmonary effort is normal.      Breath sounds: Normal breath sounds.   Skin:     General: Skin is warm and dry.      Capillary Refill: Capillary refill takes less than 2 seconds.   Neurological:      General: No focal " deficit present.      Mental Status: He is alert and oriented to person, place, and time.   Psychiatric:         Mood and Affect: Mood normal.         Behavior: Behavior normal.     Labs:   Component      Latest Ref Rng 11/18/2024   Sodium      135 - 147 mmol/L 141    Potassium      3.5 - 5.3 mmol/L 4.3    Chloride      96 - 108 mmol/L 107    Carbon Dioxide      21 - 32 mmol/L 24    ANION GAP      4 - 13 mmol/L 10    BUN      5 - 25 mg/dL 17    Creatinine      0.60 - 1.30 mg/dL 0.78    GLUCOSE, FASTING      65 - 99 mg/dL 164 (H)    Calcium      8.4 - 10.2 mg/dL 8.9    AST      13 - 39 U/L 25    ALT      7 - 52 U/L 28    ALK PHOS      34 - 104 U/L 67    Total Protein      6.4 - 8.4 g/dL 6.4    Albumin      3.5 - 5.0 g/dL 4.3    Total Bilirubin      0.20 - 1.00 mg/dL 0.44    GFR, Calculated      ml/min/1.73sq m 102    Hemoglobin A1C      Normal 4.0-5.6%; PreDiabetic 5.7-6.4%; Diabetic >=6.5%; Glycemic control for adults with diabetes <7.0% % 7.7 (H)    eAG, EST AVG Glucose      mg/dl 174    VITAMIN D, 25-HYDROXY      30.0 - 100.0 ng/mL 22.3 (L)    Vitamin B-12      180 - 914 pg/mL 903    TSH 3RD GENERATON      0.450 - 4.500 uIU/mL 1.573        Administrative Statements

## 2024-11-19 NOTE — ASSESSMENT & PLAN NOTE
Lab Results   Component Value Date    HGBA1C 7.7 (H) 11/18/2024     HGA1C above goal. Blood sugars entered on insulin pump download range between 100- 165 mg/dL with no available CGM download for review. Patient will ask wife to help login to Dexcom Clarity to share CGM with office and once reviewed, will adjust regimen. Assisted patient with entry of current settings in to Omnipod 5 vicenta.     Discussed risks/complications associated with uncontrolled diabetes including organ involvement, heart attack, stroke, death.    Advised lifestyle modifications including attention to diet including the amount and types of carbohydrates consumed and regular activity.     Call for blood sugars less than 70 mg/dl or patterns over 250 mg/dl.    Discussed symptoms and treatment of hypoglycemia.  Reviewed risks associated with hypoglycemia. Always carry rapid acting carbohydrates and a glucometer (a way to check your blood sugar).    Recommendation for medical identification either bracelet, necklace.    Routine follow up for diabetic eye and foot exams.     Ordered blood work to complete prior to next visit.    Send glucose logs/CGM download in 1-2 weeks for review    Follow up in 3 months.     Orders:    Hemoglobin A1C; Future    Comprehensive metabolic panel; Future    Lipid panel; Future

## 2024-11-19 NOTE — TELEPHONE ENCOUNTER
----- Message from Cortez De Leon MD sent at 11/18/2024  5:31 PM EST -----  Please take vitamin D 5000 daily for a month and then 2000 daily after that  Thyroids good  Iron is good  B12 is good  Prostate is good

## 2024-11-19 NOTE — PATIENT INSTRUCTIONS
"Low blood sugar in people with diabetes   The Basics   Written by the doctors and editors at [facility]   What is low blood sugar? -- This is when the level of sugar in a person's blood gets too low. It is also called \"hypoglycemia.\"  Low blood sugar can cause symptoms ranging from sweating and feeling hungry to passing out.  Low blood sugar can happen in people with diabetes who take certain medicines, including insulin, other medicines given as shots, and some types of pills.  When can people with diabetes get low blood sugar? -- People with diabetes can get low blood sugar when they:   Take too much medicine, including insulin, other medicines given as shots, or certain diabetes pills   Do not eat enough food   Exercise too much without eating a snack or reducing their insulin dose   Wait too long between meals   Drink too much alcohol or drink alcohol on an empty stomach  What are the symptoms of low blood sugar? -- The symptoms can be different from person to person, and can change over time. During the early stages of low blood sugar, a person can:   Sweat or tremble   Feel hungry   Feel worried  People who have early symptoms should check their blood sugar level to see if it is low and needs to be treated. If low blood sugar levels are not treated, severe symptoms can occur. These can include:   Trouble walking or feeling weak   Trouble seeing clearly   Being confused or acting in a strange way   Passing out or having a seizure  Some people do not get symptoms during the early stages of low blood sugar. Doctors sometimes call this \"hypoglycemia unawareness.\" People with hypoglycemia unawareness are more likely to have severe symptoms, because they might not know that they have low blood sugar until they have severe symptoms. Hypoglycemia unawareness is more likely in people who:   Have had type 1 diabetes for more than 5 to 10 years   Have frequent episodes of low blood sugar   Use insulin to keep their blood " sugar level tightly managed   Are tired   Drink a lot of alcohol   Take certain medicines for high blood pressure or diabetes  How is low blood sugar treated? -- It can be treated with:   Quick sources of sugar - People can eat or drink quick sources of sugar (table 1). Foods that have fat, such as chocolate or cheese, do not treat low blood sugar as quickly. You and a family member should carry a quick source of sugar at all times.   A dose of glucagon - Glucagon is a hormone that can quickly raise blood sugar levels and stop severe symptoms. It comes as a shot (figure 1) or a nose spray. If your doctor recommends that you carry glucagon with you, they will tell you when and how to use it. If possible, it's also a good idea to have a family member, friend, or roommate learn how to give you glucagon. That way, they can give it to you if you can't do it yourself.  After treating low blood sugar, it is very important to recheck your blood sugar level to make sure that it rises and stays in the normal range. Once your blood sugar is normal, eat a small snack that contains protein, fat, and carbohydrate. This can help keep your blood sugar stable.  What should I do after treatment? -- After treatment for low blood sugar, most people can get back to their usual routine. But your doctor or nurse might recommend that you check your blood sugar level more often during the next 2 to 3 days.  If your low blood sugar was treated with glucagon, call your doctor or nurse. They might change the dose of your diabetes medicine.  How can I prevent low blood sugar? -- The best way is to:   Check your blood sugar levels often - Your doctor or nurse will tell you how and when to check your blood sugar levels at home. They will also tell you what your blood sugar levels should be, and when to treat low blood sugar.   Learn the symptoms of low blood sugar, and be ready to treat it in the early stages. Treating low blood sugar early can  "prevent severe symptoms.  When should I go to a hospital or call for an ambulance? -- A family member or friend should take you to a hospital or call for an ambulance (in the [place] and [place], call 9-1-1) if you:   Are still confused 15 minutes after being treated with a dose of glucagon   Have passed out, and there is no glucagon nearby   Still have low blood sugar after treatment  If you have low blood sugar, do not try to drive yourself to the hospital. Driving with low blood sugar can be dangerous.  All topics are updated as new evidence becomes available and our peer review process is complete.  This topic retrieved from [employer] on: Apr 11, 2024.  Topic 49607 Version 22.0  Release: 32.3.2 - C32.100  © 2024 UpToDate, Inc. and/or its affiliates. All rights reserved.  table 1: Quick sources of sugar to treat low blood sugar  3 or 4 glucose tablets   ½ cup of juice or regular soda (not sugar-free)   2 tablespoons of raisins   4 or 5 saltine crackers   1 tablespoon of sugar   1 tablespoon of honey or corn syrup   6 to 8 hard candies   These sources of sugar act quickly to treat low blood sugar levels. People with diabetes who use insulin or certain other diabetes medicines should carry at least 1 of these items at all times.  Graphic 93647 Version 4.0  figure 1: Glucagon autoinjector     Some people carry glucagon in the form of an autoinjector \"pen.\" This makes it easy to give a dose into the upper arm, thigh, or belly.  Graphic 291267 Version 2.0  Consumer Information Use and Disclaimer   Disclaimer: This generalized information is a limited summary of diagnosis, treatment, and/or medication information. It is not meant to be comprehensive and should be used as a tool to help the user understand and/or assess potential diagnostic and treatment options. It does NOT include all information about conditions, treatments, medications, side effects, or risks that may apply to a specific patient. It is not intended to be " medical advice or a substitute for the medical advice, diagnosis, or treatment of a health care provider based on the health care provider's examination and assessment of a patient's specific and unique circumstances. Patients must speak with a health care provider for complete information about their health, medical questions, and treatment options, including any risks or benefits regarding use of medications. This information does not endorse any treatments or medications as safe, effective, or approved for treating a specific patient. UpToDate, Inc. and its affiliates disclaim any warranty or liability relating to this information or the use thereof.The use of this information is governed by the Terms of Use, available at https://www.[employer]uwer.com/en/know/clinical-effectiveness-terms. 2024© UpToDate, Inc. and its affiliates and/or licensors. All rights reserved.  Copyright   © 2024 UpToDate, Inc. and/or its affiliates. All rights reserved.

## 2025-02-01 DIAGNOSIS — E66.811 CLASS 1 OBESITY WITH BODY MASS INDEX (BMI) OF 30.0 TO 30.9 IN ADULT, UNSPECIFIED OBESITY TYPE, UNSPECIFIED WHETHER SERIOUS COMORBIDITY PRESENT: ICD-10-CM

## 2025-02-01 DIAGNOSIS — E11.65 TYPE 2 DIABETES MELLITUS WITH HYPERGLYCEMIA, WITH LONG-TERM CURRENT USE OF INSULIN (HCC): ICD-10-CM

## 2025-02-01 DIAGNOSIS — Z79.4 TYPE 2 DIABETES MELLITUS WITH HYPERGLYCEMIA, WITH LONG-TERM CURRENT USE OF INSULIN (HCC): ICD-10-CM

## 2025-02-01 DIAGNOSIS — E11.69 TYPE 2 DIABETES MELLITUS WITH OTHER SPECIFIED COMPLICATION, WITH LONG-TERM CURRENT USE OF INSULIN (HCC): ICD-10-CM

## 2025-02-01 DIAGNOSIS — Z79.4 TYPE 2 DIABETES MELLITUS WITH OTHER SPECIFIED COMPLICATION, WITH LONG-TERM CURRENT USE OF INSULIN (HCC): ICD-10-CM

## 2025-02-01 DIAGNOSIS — R80.9 MICROALBUMINURIA: ICD-10-CM

## 2025-02-01 DIAGNOSIS — I10 PRIMARY HYPERTENSION: ICD-10-CM

## 2025-02-01 DIAGNOSIS — E78.00 PURE HYPERCHOLESTEROLEMIA: ICD-10-CM

## 2025-02-03 RX ORDER — LISINOPRIL 5 MG/1
5 TABLET ORAL DAILY
Qty: 90 TABLET | Refills: 1 | Status: SHIPPED | OUTPATIENT
Start: 2025-02-03

## 2025-03-19 ENCOUNTER — APPOINTMENT (OUTPATIENT)
Dept: LAB | Facility: CLINIC | Age: 55
End: 2025-03-19
Payer: COMMERCIAL

## 2025-03-19 DIAGNOSIS — E55.9 VITAMIN D DEFICIENCY: ICD-10-CM

## 2025-03-19 DIAGNOSIS — E11.69 TYPE 2 DIABETES MELLITUS WITH OTHER SPECIFIED COMPLICATION, WITH LONG-TERM CURRENT USE OF INSULIN (HCC): ICD-10-CM

## 2025-03-19 DIAGNOSIS — Z79.4 TYPE 2 DIABETES MELLITUS WITH OTHER SPECIFIED COMPLICATION, WITH LONG-TERM CURRENT USE OF INSULIN (HCC): ICD-10-CM

## 2025-03-19 LAB
25(OH)D3 SERPL-MCNC: 35.7 NG/ML (ref 30–100)
ALBUMIN SERPL BCG-MCNC: 4.5 G/DL (ref 3.5–5)
ALP SERPL-CCNC: 84 U/L (ref 34–104)
ALT SERPL W P-5'-P-CCNC: 31 U/L (ref 7–52)
ANION GAP SERPL CALCULATED.3IONS-SCNC: 10 MMOL/L (ref 4–13)
AST SERPL W P-5'-P-CCNC: 26 U/L (ref 13–39)
BILIRUB SERPL-MCNC: 0.5 MG/DL (ref 0.2–1)
BUN SERPL-MCNC: 15 MG/DL (ref 5–25)
CALCIUM SERPL-MCNC: 9.4 MG/DL (ref 8.4–10.2)
CHLORIDE SERPL-SCNC: 102 MMOL/L (ref 96–108)
CHOLEST SERPL-MCNC: 124 MG/DL (ref ?–200)
CO2 SERPL-SCNC: 26 MMOL/L (ref 21–32)
CREAT SERPL-MCNC: 0.78 MG/DL (ref 0.6–1.3)
GFR SERPL CREATININE-BSD FRML MDRD: 101 ML/MIN/1.73SQ M
GLUCOSE P FAST SERPL-MCNC: 181 MG/DL (ref 65–99)
HDLC SERPL-MCNC: 29 MG/DL
LDLC SERPL CALC-MCNC: 68 MG/DL (ref 0–100)
NONHDLC SERPL-MCNC: 95 MG/DL
POTASSIUM SERPL-SCNC: 4.7 MMOL/L (ref 3.5–5.3)
PROT SERPL-MCNC: 6.9 G/DL (ref 6.4–8.4)
SODIUM SERPL-SCNC: 138 MMOL/L (ref 135–147)
TRIGL SERPL-MCNC: 136 MG/DL (ref ?–150)

## 2025-03-19 PROCEDURE — 80061 LIPID PANEL: CPT

## 2025-03-19 PROCEDURE — 36415 COLL VENOUS BLD VENIPUNCTURE: CPT

## 2025-03-19 PROCEDURE — 83036 HEMOGLOBIN GLYCOSYLATED A1C: CPT

## 2025-03-19 PROCEDURE — 80053 COMPREHEN METABOLIC PANEL: CPT

## 2025-03-19 PROCEDURE — 82306 VITAMIN D 25 HYDROXY: CPT

## 2025-03-20 ENCOUNTER — TELEPHONE (OUTPATIENT)
Age: 55
End: 2025-03-20

## 2025-03-20 ENCOUNTER — OFFICE VISIT (OUTPATIENT)
Dept: ENDOCRINOLOGY | Facility: CLINIC | Age: 55
End: 2025-03-20
Payer: COMMERCIAL

## 2025-03-20 VITALS
WEIGHT: 213.4 LBS | DIASTOLIC BLOOD PRESSURE: 62 MMHG | HEART RATE: 82 BPM | SYSTOLIC BLOOD PRESSURE: 135 MMHG | HEIGHT: 69 IN | BODY MASS INDEX: 31.61 KG/M2 | OXYGEN SATURATION: 98 %

## 2025-03-20 DIAGNOSIS — R80.9 MICROALBUMINURIA: ICD-10-CM

## 2025-03-20 DIAGNOSIS — Z79.4 TYPE 2 DIABETES MELLITUS WITH OTHER SPECIFIED COMPLICATION, WITH LONG-TERM CURRENT USE OF INSULIN (HCC): ICD-10-CM

## 2025-03-20 DIAGNOSIS — E11.69 TYPE 2 DIABETES MELLITUS WITH OTHER SPECIFIED COMPLICATION, WITH LONG-TERM CURRENT USE OF INSULIN (HCC): ICD-10-CM

## 2025-03-20 DIAGNOSIS — I10 PRIMARY HYPERTENSION: Primary | ICD-10-CM

## 2025-03-20 DIAGNOSIS — E78.00 PURE HYPERCHOLESTEROLEMIA: ICD-10-CM

## 2025-03-20 LAB
EST. AVERAGE GLUCOSE BLD GHB EST-MCNC: 177 MG/DL
HBA1C MFR BLD: 7.8 %

## 2025-03-20 PROCEDURE — 99244 OFF/OP CNSLTJ NEW/EST MOD 40: CPT | Performed by: NURSE PRACTITIONER

## 2025-03-20 NOTE — ASSESSMENT & PLAN NOTE
Lab Results   Component Value Date    HGBA1C 7.7 (H) 11/18/2024     HGA1C above goal from November 2024 due for repeat A1c. GMI at goal 6.7% without hypoglycemia.     Discussed risks/complications associated with uncontrolled diabetes including organ involvement, heart attack, stroke, death.     Advised lifestyle modifications including attention to diet including the amount and types of carbohydrates consumed and regular activity.     Call for blood sugars less than 70 mg/dl or patterns over 250 mg/dl.     Discussed symptoms and treatment of hypoglycemia.  Reviewed risks associated with hypoglycemia. Always carry rapid acting carbohydrates and a glucometer (a way to check your blood sugar).    Recommendation for medical identification either bracelet, necklace.    Recommendation for glucagon if on insulin.     Routine follow up for diabetic eye and foot exams.     Ordered blood work to complete prior to next visit.    Send glucose logs/CGM download in 1-2 weeks for review after automation.     Follow up in 3 months.       Orders:    Hemoglobin A1C; Future    Comprehensive metabolic panel; Future

## 2025-03-20 NOTE — ASSESSMENT & PLAN NOTE
Lifestyle modifications including moderate intensity physical activity up to 150 minutes/week with 2-3 days of weight training in addition to carb conscious diet.

## 2025-03-20 NOTE — TELEPHONE ENCOUNTER
[facility]fe called stating patient can't connect Decom on the Clarity Yessy. States there is an option for G6 but not for the G7. Provided number to wife for Dexcom technical support.

## 2025-03-20 NOTE — ASSESSMENT & PLAN NOTE
Recommend repeat microalbumin/creatinine ratio.     Orders:    Albumin / creatinine urine ratio; Future

## 2025-03-20 NOTE — PROGRESS NOTES
Name: Marleen Blevins      : 1970      MRN: [id number]  Encounter Provider: SHAMIR Andres  Encounter Date: 3/20/2025   Encounter department: [facility]    No chief complaint on file.  :  Assessment & Plan  Primary hypertension  BP stable continues on regimen including ACE.          Pure hypercholesterolemia  Continues on statin for cholesterol.          Microalbuminuria  Recommend repeat microalbumin/creatinine ratio.     Orders:    Albumin / creatinine urine ratio; Future    Type 2 diabetes mellitus with other specified complication, with long-term current use of insulin ([facility])    Lab Results   Component Value Date    HGBA1C 7.7 (H) 2024     HGA1C above goal from 2024 due for repeat A1c. GMI at goal 6.7% without hypoglycemia.     Discussed risks/complications associated with uncontrolled diabetes including organ involvement, heart attack, stroke, death.     Advised lifestyle modifications including attention to diet including the amount and types of carbohydrates consumed and regular activity.     Call for blood sugars less than 70 mg/dl or patterns over 250 mg/dl.     Discussed symptoms and treatment of hypoglycemia.  Reviewed risks associated with hypoglycemia. Always carry rapid acting carbohydrates and a glucometer (a way to check your blood sugar).    Recommendation for medical identification either bracelet, necklace.    Recommendation for glucagon if on insulin.     Routine follow up for diabetic eye and foot exams.     Ordered blood work to complete prior to next visit.    Send glucose logs/CGM download in 1-2 weeks for review after automation.     Follow up in 3 months.       Orders:    Hemoglobin A1C; Future    Comprehensive metabolic panel; Future        History of Present Illness     Marleen Blevins is a 54 y.o. male with a history of type 2 diabetes with long term use of insulin without complication.      Denies recent illness or  hospitalizations.      Denies recent severe hypoglycemic or severe hyperglycemic episodes requiring medical attention or third party assistance since last office visit.       Denies changes in thirst, frequency of urination, vision, or sensation in feet.      Using Dexcom Continuous glucose monitor but unable to login to [employer] to connect to office for review and download of CGM.      Patient is on a OmniPod 5 insulin pump pump prescribed by endocrinology. Current Problems with Pump: does not integrate at this time.      CGM Interpretation  Marleen Blevins   Device used Dexcom for Personal Use  Indication: Type of Diabetes: Type 2 Diabetes  More than 72 hours of data was reviewed. Report to be scanned to chart.   Date Range: March 7-20, 2025  Analysis of data:   Average Glucose: 144 mg/dl  Coefficient of Variation: 21.1%  SD : 30 mg/dl  GMI: 6.7%  Time in Target Range: 90%  Time Above Range: 10%  Time Below Range: 0%   Interpretation of data:   Minimal post-prandial hyperglycemia.     Current Insulin pump settings:  Basal rate: 12 AM 1 unit/h        Insulin to carb ratio: 8  Insulin sensitivity factor: 40  BG target: 120  Active Insulin time: 3.5     Type of insulin: Novolog     Backup Plan: Has back up short and long-acting insulins.  Aware that in case of malfunctioning of the pump or unexplained hyperglycemia to use basal and bolus therapy as backup which is prescribed to the patient. Also notified patient to call clinic and/or pump company if any issues or go to emergency department if signs/symptoms of DKA.         Current regimen: Insulin pump and Synjardy 12.5-1000 twice daily     Denies side effect of treatment including dysuria, genital yeast, or skin infection.      Continues on ACE and Statin.      Denies history of thyroid disorder  Denies history of pancreatitis     History obtained from: patient     Review of Systems  See HPI.   All other systems reviewed and are negative.      Review of Systems as per  HPI    Medical History Reviewed by provider this encounter:  Tobacco  Allergies  Meds  Problems  Med Hx  Surg Hx  Fam Hx     .  Current Outpatient Medications on File Prior to Visit   Medication Sig Dispense Refill    albuterol (Proventil HFA) 90 mcg/act inhaler Inhale 2 puffs every 4 (four) hours as needed for wheezing 18 g 0    aspirin (Aspirin Low Dose) 81 mg chewable tablet Chew 1 tablet (81 mg total) daily 180 tablet 0    atorvastatin (LIPITOR) 20 mg tablet TAKE 1 TABLET BY MOUTH EVERY DAY 90 tablet 1    azelastine (ASTELIN) 0.1 % nasal spray 1 spray into each nostril 2 (two) times a day Use in each nostril as directed 30 mL 0    b complex vitamins capsule Take 1 capsule by mouth daily      Continuous Glucose Sensor (Dexcom G7 Sensor) Use 1 Device every 10 days 9 each 2    EPINEPHrine (EPIPEN) 0.3 mg/0.3 mL SOAJ EpiPen 2-Neeraj 0.3 mg/0.3 mL injection, auto-injector      Insulin Disposable Pump (Omnipod 5 G6 Pods, Gen 5,) MISC CHANGE EVERY 72 HOURS AS DIRECTED 30 each 1    lisinopril (ZESTRIL) 5 mg tablet TAKE 1 TABLET (5 MG TOTAL) BY MOUTH DAILY. 90 tablet 1    multivitamin (THERAGRAN) TABS Take 1 tablet by mouth daily.      NovoLOG 100 UNIT/ML injection Use 60 units daily via insulin pump. 50 mL 3    omalizumab (Xolair) subcutaneous injection Inject 2 mL (300 mg total) under the skin every 8 weeks 2 mL 11    Synjardy XR 12.5-1000 MG TB24 TAKE ONE TABLET (125-1000 MG) TWO TIMES A DAY WITH MEALS 180 tablet 1    tadalafil (CIALIS) 5 MG tablet Take 1 tablet (5 mg total) by mouth in the morning 90 tablet 3    Varenicline Tartrate (CHANTIX PO)       zinc gluconate 50 mg tablet Take 50 mg by mouth daily      [DISCONTINUED] GLIMEPIRIDE PO        No current facility-administered medications on file prior to visit.         Medical History Reviewed by provider this encounter:  Tobacco  Allergies  Meds  Problems  Med Hx  Surg Hx  Fam Hx     .    Objective   /62 (BP Location: Left arm, Patient Position:  "Sitting, Cuff Size: Large)   Pulse 82   Ht 5' 9\" (1.753 m)   Wt 96.8 kg (213 lb 6.4 oz)   SpO2 98%   BMI 31.51 kg/m²      Body mass index is 31.51 kg/m².  Wt Readings from Last 3 Encounters:   03/20/25 96.8 kg (213 lb 6.4 oz)   11/19/24 97.5 kg (215 lb)   10/09/24 96.6 kg (213 lb)       Physical Exam   Constitutional: He is oriented to person, place, and time. He appears well-developed and well-nourished. No distress.   HENT:   Head: Normocephalic and atraumatic.   Neck: Normal range of motion.   Pulmonary/Chest: Effort normal.   Musculoskeletal: Normal range of motion.   Neurological: He is alert and oriented to person, place, and time.   Skin: He is not diaphoretic.   Psychiatric: He has a normal mood and affect. His behavior is normal.         Labs:   Lab Results   Component Value Date    HGBA1C 7.7 (H) 11/18/2024    HGBA1C 7.5 (H) 06/04/2024    HGBA1C 7.3 (H) 03/04/2024     Lab Results   Component Value Date    CREATININE 0.78 03/19/2025    CREATININE 0.78 11/18/2024    CREATININE 0.78 06/04/2024    BUN 15 03/19/2025    K 4.7 03/19/2025     03/19/2025    CO2 26 03/19/2025     eGFR   Date Value Ref Range Status   03/19/2025 101 ml/min/1.73sq m Final     Lab Results   Component Value Date    HDL 29 (L) 03/19/2025    TRIG 136 03/19/2025     Lab Results   Component Value Date    ALT 31 03/19/2025    AST 26 03/19/2025    ALKPHOS 84 03/19/2025     Lab Results   Component Value Date    CQN7MRBGWJBZ 1.573 11/18/2024    CFT4RDGPWZOG 2.457 06/04/2024    PZT4RJUBDVCQ 1.745 05/30/2023     Lab Results   Component Value Date    FREET4 1.19 09/02/2017       There are no Patient Instructions on file for this visit.    Discussed with the patient and all questioned fully answered. He will call me if any problems arise.    Administrative Statements     "

## 2025-04-13 DIAGNOSIS — E11.65 TYPE 2 DIABETES MELLITUS WITH HYPERGLYCEMIA, WITH LONG-TERM CURRENT USE OF INSULIN (HCC): ICD-10-CM

## 2025-04-13 DIAGNOSIS — Z23 NEED FOR VACCINATION: ICD-10-CM

## 2025-04-13 DIAGNOSIS — Z79.4 TYPE 2 DIABETES MELLITUS WITH HYPERGLYCEMIA, WITH LONG-TERM CURRENT USE OF INSULIN (HCC): ICD-10-CM

## 2025-04-14 RX ORDER — INSULIN PMP CART,AUT,G6/7,CNTR
EACH SUBCUTANEOUS
Qty: 30 EACH | Refills: 1 | Status: SHIPPED | OUTPATIENT
Start: 2025-04-14

## 2025-04-14 RX ORDER — ASPIRIN 81 MG
81 TABLET,CHEWABLE ORAL DAILY
Qty: 90 TABLET | Refills: 2 | Status: SHIPPED | OUTPATIENT
Start: 2025-04-14

## 2025-04-29 DIAGNOSIS — Z79.4 TYPE 2 DIABETES MELLITUS WITH OTHER SPECIFIED COMPLICATION, WITH LONG-TERM CURRENT USE OF INSULIN (HCC): ICD-10-CM

## 2025-04-29 DIAGNOSIS — E11.69 TYPE 2 DIABETES MELLITUS WITH OTHER SPECIFIED COMPLICATION, WITH LONG-TERM CURRENT USE OF INSULIN (HCC): ICD-10-CM

## 2025-04-29 DIAGNOSIS — E66.811 CLASS 1 OBESITY WITH BODY MASS INDEX (BMI) OF 30.0 TO 30.9 IN ADULT, UNSPECIFIED OBESITY TYPE, UNSPECIFIED WHETHER SERIOUS COMORBIDITY PRESENT: ICD-10-CM

## 2025-04-29 DIAGNOSIS — R80.9 MICROALBUMINURIA: ICD-10-CM

## 2025-04-29 DIAGNOSIS — I10 PRIMARY HYPERTENSION: ICD-10-CM

## 2025-04-29 DIAGNOSIS — E78.00 PURE HYPERCHOLESTEROLEMIA: ICD-10-CM

## 2025-04-29 DIAGNOSIS — Z79.4 TYPE 2 DIABETES MELLITUS WITH HYPERGLYCEMIA, WITH LONG-TERM CURRENT USE OF INSULIN (HCC): ICD-10-CM

## 2025-04-29 DIAGNOSIS — E11.65 TYPE 2 DIABETES MELLITUS WITH HYPERGLYCEMIA, WITH LONG-TERM CURRENT USE OF INSULIN (HCC): ICD-10-CM

## 2025-04-30 RX ORDER — ATORVASTATIN CALCIUM 20 MG/1
20 TABLET, FILM COATED ORAL DAILY
Qty: 90 TABLET | Refills: 1 | Status: SHIPPED | OUTPATIENT
Start: 2025-04-30

## 2025-05-13 DIAGNOSIS — E11.69 TYPE 2 DIABETES MELLITUS WITH OTHER SPECIFIED COMPLICATION, WITH LONG-TERM CURRENT USE OF INSULIN (HCC): ICD-10-CM

## 2025-05-13 DIAGNOSIS — Z79.4 TYPE 2 DIABETES MELLITUS WITH OTHER SPECIFIED COMPLICATION, WITH LONG-TERM CURRENT USE OF INSULIN (HCC): ICD-10-CM

## 2025-05-13 RX ORDER — ACYCLOVIR 400 MG/1
1 TABLET ORAL
Qty: 9 EACH | Refills: 1 | Status: SHIPPED | OUTPATIENT
Start: 2025-05-13

## 2025-05-19 ENCOUNTER — OFFICE VISIT (OUTPATIENT)
Dept: FAMILY MEDICINE CLINIC | Facility: CLINIC | Age: 55
End: 2025-05-19
Payer: COMMERCIAL

## 2025-05-19 VITALS
HEART RATE: 79 BPM | OXYGEN SATURATION: 96 % | SYSTOLIC BLOOD PRESSURE: 136 MMHG | DIASTOLIC BLOOD PRESSURE: 80 MMHG | WEIGHT: 204 LBS | BODY MASS INDEX: 30.21 KG/M2 | HEIGHT: 69 IN

## 2025-05-19 DIAGNOSIS — E11.69 TYPE 2 DIABETES MELLITUS WITH OTHER SPECIFIED COMPLICATION, WITH LONG-TERM CURRENT USE OF INSULIN (HCC): ICD-10-CM

## 2025-05-19 DIAGNOSIS — Z12.2 SCREENING FOR LUNG CANCER: Primary | ICD-10-CM

## 2025-05-19 DIAGNOSIS — Z12.5 SCREENING FOR PROSTATE CANCER: ICD-10-CM

## 2025-05-19 DIAGNOSIS — E66.811 CLASS 1 OBESITY WITH BODY MASS INDEX (BMI) OF 30.0 TO 30.9 IN ADULT, UNSPECIFIED OBESITY TYPE, UNSPECIFIED WHETHER SERIOUS COMORBIDITY PRESENT: ICD-10-CM

## 2025-05-19 DIAGNOSIS — F17.200 SMOKER: ICD-10-CM

## 2025-05-19 DIAGNOSIS — J43.9 PULMONARY EMPHYSEMA, UNSPECIFIED EMPHYSEMA TYPE (HCC): ICD-10-CM

## 2025-05-19 DIAGNOSIS — I10 PRIMARY HYPERTENSION: ICD-10-CM

## 2025-05-19 DIAGNOSIS — E78.00 PURE HYPERCHOLESTEROLEMIA: ICD-10-CM

## 2025-05-19 DIAGNOSIS — Z79.4 TYPE 2 DIABETES MELLITUS WITH OTHER SPECIFIED COMPLICATION, WITH LONG-TERM CURRENT USE OF INSULIN (HCC): ICD-10-CM

## 2025-05-19 PROCEDURE — 99214 OFFICE O/P EST MOD 30 MIN: CPT | Performed by: FAMILY MEDICINE

## 2025-05-19 NOTE — ASSESSMENT & PLAN NOTE
Lab Results   Component Value Date    HGBA1C 7.8 (H) 03/19/2025       Orders:    Stress test only, exercise; Future

## 2025-05-19 NOTE — PROGRESS NOTES
Name: Marleen Blevins      : 1970      MRN: [id number]  Encounter Provider: Cortez De Leon MD  Encounter Date: 2025   Encounter department: [facility]    :  Assessment & Plan  Screening for lung cancer  I discussed with him that he is a candidate for lung cancer CT screening.     The following Shared Decision-Making points were covered:  Benefits of screening were discussed, including the rates of reduction in death from lung cancer and other causes.  Harms of screening were reviewed, including false positive tests, radiation exposure levels, risks of invasive procedures, risks of complications of screening, and risk of overdiagnosis.  I counseled on the importance of adherence to annual lung cancer LDCT screening, impact of co-morbidities, and ability or willingness to undergo diagnosis and treatment.  I counseled on the importance of maintaining abstinence as a former smoker or was counseled on the importance of smoking cessation if a current smoker    Review of Eligibility Criteria: He meets all of the criteria for Lung Cancer Screening.   He is 54 y.o.   He has 20 pack year tobacco history and is a current smoker or has quit within the past 15 years  He presents no signs or symptoms of lung cancer    After discussion, the patient decided to elect lung cancer screening.    Orders:    CT Lung Screening Program; Future    Primary hypertension    Orders:    Stress test only, exercise; Future    Pure hypercholesterolemia    Orders:    Stress test only, exercise; Future    Type 2 diabetes mellitus with other specified complication, with long-term current use of insulin (HCC)    Lab Results   Component Value Date    HGBA1C 7.8 (H) 2025       Orders:    Stress test only, exercise; Future    Smoker    Orders:    Stress test only, exercise; Future    Class 1 obesity with body mass index (BMI) of 30.0 to 30.9 in adult, unspecified obesity type, unspecified whether serious comorbidity  present      Orders:    Stress test only, exercise; Future    Pulmonary emphysema, unspecified emphysema type (HCC)  Seen on ct scan - will check agqain          Screening for prostate cancer    Orders:    PSA, Total Screen; Future      Assessment & Plan  1. Diabetes Mellitus.  - A1c levels have consistently been in the mid-7s range.  - Blood glucose meter readings are satisfactory, with an average of 138. The past 14 days show an average of 6.6, and the past 30 days show an average of 6.7, with an overall average of 141.  - Advised to undergo blood and urine tests prior to the next appointment with the endocrinologist at the end of June 2025.    2. Varicose Veins.  - Reports no pain but notes the presence of many blue varicose veins.  - Advised to use compression stockings, especially if on feet all day, to help manage the condition.    3. Mild Emphysema.  - Previous CT scan showed mild emphysema.  - A repeat CT scan for lung cancer screening will be ordered to monitor the condition.    4. Cardiac Health.  - Has never seen a cardiologist.  - An exercise stress test will be ordered to evaluate cardiac health.    5. Nocturia.  - Reports waking up 1-2 times per night to use the bathroom, which is considered acceptable.  - A PSA test will be ordered for 11/18/2025 to monitor prostate health.    Follow-up  The patient will follow up in 6 months.           History of Present Illness     History of Present Illness  The patient presents for evaluation of diabetes, varicose veins, and nocturia.    He has been monitoring his blood glucose levels at home, with the most recent reading being 137. His average blood glucose level over the past 14 days is 6.6, and over the past 30 days, it is 6.7, with an overall average of 141. He reports no significant changes in his health status since his last visit 6 months ago. He continues to receive Xolair injections and has not made any alterations to his treatment regimen. A urine test  "was ordered during his last visit.    He reports no foot pain, numbness, or tingling. However, he has observed numerous blue veins on his skin, indicative of varicose veins. Despite this, he is able to walk without difficulty for up to 12 hours.    He has not consulted a cardiologist and does not engage in treadmill exercises due to perceived leg weakness. He does not use a cane for mobility support.    He reports no issues related to his prostate or urinary system. Previously, he would wake up twice during the night to urinate, but this has now reduced to once or twice.     Review of Systems   Constitutional:  Negative for activity change, appetite change and fever.   HENT:  Negative for congestion, nosebleeds and trouble swallowing.    Eyes:  Negative for itching.   Respiratory:  Negative for cough and chest tightness.    Cardiovascular:  Negative for chest pain and palpitations.   Gastrointestinal:  Negative for abdominal pain, constipation, diarrhea and nausea.   Endocrine: Negative for cold intolerance.   Genitourinary:  Negative for frequency.   Musculoskeletal:  Negative for gait problem and joint swelling.   Skin:  Negative for rash.   Allergic/Immunologic: Negative for immunocompromised state.   Neurological:  Negative for dizziness, tremors, seizures, syncope and headaches.   Psychiatric/Behavioral:  Negative for hallucinations and suicidal ideas.      Objective   /80   Pulse 79   Ht 5' 9\" (1.753 m)   Wt 92.5 kg (204 lb)   SpO2 96%   BMI 30.13 kg/m²     Physical Exam  - Respiratory: Clear to auscultation, no wheezing, rales or rhonchi  Physical Exam  Vitals and nursing note reviewed.   Constitutional:       Appearance: He is well-developed. He is obese.   HENT:      Head: Normocephalic and atraumatic.     Eyes:      Conjunctiva/sclera: Conjunctivae normal.      Pupils: Pupils are equal, round, and reactive to light.       Cardiovascular:      Rate and Rhythm: Normal rate and regular rhythm.      " Pulses: no weak pulses.           Dorsalis pedis pulses are 2+ on the right side and 2+ on the left side.      Heart sounds: Normal heart sounds.   Pulmonary:      Effort: Pulmonary effort is normal.      Breath sounds: Normal breath sounds. No wheezing or rales.   Abdominal:      General: Bowel sounds are normal. There is no distension.      Palpations: Abdomen is soft.      Tenderness: There is no abdominal tenderness.     Musculoskeletal:         General: No tenderness. Normal range of motion.      Cervical back: Normal range of motion and neck supple.   Feet:      Right foot:      Skin integrity: Callus and dry skin present. No ulcer, skin breakdown, erythema or warmth.      Left foot:      Skin integrity: Callus and dry skin present. No ulcer, skin breakdown, erythema or warmth.     Skin:     General: Skin is warm and dry.      Findings: No rash.     Neurological:      Mental Status: He is alert and oriented to person, place, and time.      Cranial Nerves: No cranial nerve deficit.      Sensory: No sensory deficit.      Coordination: Coordination normal.     Psychiatric:         Behavior: Behavior normal.         Thought Content: Thought content normal.         Judgment: Judgment normal.       Patient's shoes and socks removed.    Right Foot/Ankle   Right Foot Inspection  Skin Exam: skin normal, skin intact, dry skin, callus and callus. No warmth, no erythema, no maceration, no abnormal color, no pre-ulcer and no ulcer.     Toe Exam: ROM and strength within normal limits.     Sensory   Monofilament testing: intact    Vascular  Capillary refills: < 3 seconds  The right DP pulse is 2+.     Left Foot/Ankle  Left Foot Inspection  Skin Exam: skin normal, skin intact, dry skin and callus. No warmth, no erythema, no maceration, normal color, no pre-ulcer and no ulcer.     Toe Exam: ROM and strength within normal limits.     Sensory   Monofilament testing: intact    Vascular  Capillary refills: < 3 seconds  The left DP  pulse is 2+.     Assign Risk Category  No deformity present  No loss of protective sensation  No weak pulses  Risk: 0

## 2025-06-10 DIAGNOSIS — E11.69 TYPE 2 DIABETES MELLITUS WITH OTHER SPECIFIED COMPLICATION, WITH LONG-TERM CURRENT USE OF INSULIN (HCC): ICD-10-CM

## 2025-06-10 DIAGNOSIS — Z79.4 TYPE 2 DIABETES MELLITUS WITH OTHER SPECIFIED COMPLICATION, WITH LONG-TERM CURRENT USE OF INSULIN (HCC): ICD-10-CM

## 2025-06-10 NOTE — TELEPHONE ENCOUNTER
Patients wife called to request a refill for NovoLOG 100 UNIT/ML injection advised a refill was requested on 6/10/2025 and is pending approval. Patient verbalized understanding and is in agreement.     Does the patient have enough for 3 days?   [x] Yes   [] No - Send as HP to POD

## 2025-06-11 RX ORDER — INSULIN ASPART 100 [IU]/ML
INJECTION, SOLUTION INTRAVENOUS; SUBCUTANEOUS
Qty: 50 ML | Refills: 1 | Status: SHIPPED | OUTPATIENT
Start: 2025-06-11

## 2025-06-27 ENCOUNTER — HOSPITAL ENCOUNTER (OUTPATIENT)
Dept: CT IMAGING | Facility: HOSPITAL | Age: 55
Discharge: HOME | End: 2025-06-27
Attending: FAMILY MEDICINE
Payer: COMMERCIAL

## 2025-06-27 ENCOUNTER — APPOINTMENT (OUTPATIENT)
Dept: NON INVASIVE DIAGNOSTICS | Facility: HOSPITAL | Age: 55
End: 2025-06-27
Attending: FAMILY MEDICINE
Payer: COMMERCIAL

## 2025-06-27 DIAGNOSIS — Z12.2 SCREENING FOR LUNG CANCER: ICD-10-CM

## 2025-06-27 PROCEDURE — 71271 CT THORAX LUNG CANCER SCR C-: CPT

## 2025-07-01 ENCOUNTER — HOSPITAL ENCOUNTER (OUTPATIENT)
Dept: NON INVASIVE DIAGNOSTICS | Facility: HOSPITAL | Age: 55
Discharge: HOME | End: 2025-07-01
Attending: FAMILY MEDICINE
Payer: COMMERCIAL

## 2025-07-01 DIAGNOSIS — E66.811 CLASS 1 OBESITY WITH BODY MASS INDEX (BMI) OF 30.0 TO 30.9 IN ADULT, UNSPECIFIED OBESITY TYPE, UNSPECIFIED WHETHER SERIOUS COMORBIDITY PRESENT: ICD-10-CM

## 2025-07-01 DIAGNOSIS — I10 PRIMARY HYPERTENSION: ICD-10-CM

## 2025-07-01 DIAGNOSIS — Z79.4 TYPE 2 DIABETES MELLITUS WITH OTHER SPECIFIED COMPLICATION, WITH LONG-TERM CURRENT USE OF INSULIN (HCC): ICD-10-CM

## 2025-07-01 DIAGNOSIS — E11.69 TYPE 2 DIABETES MELLITUS WITH OTHER SPECIFIED COMPLICATION, WITH LONG-TERM CURRENT USE OF INSULIN (HCC): ICD-10-CM

## 2025-07-01 DIAGNOSIS — E78.00 PURE HYPERCHOLESTEROLEMIA: ICD-10-CM

## 2025-07-01 DIAGNOSIS — F17.200 SMOKER: ICD-10-CM

## 2025-07-01 LAB
CHEST PAIN STATEMENT: NORMAL
CHEST PAIN STATEMENT: NORMAL
MAX DIASTOLIC BP: 72 MMHG
MAX DIASTOLIC BP: 72 MMHG
MAX HR PERCENT: 99 %
MAX HR: 164 BPM
MAX PREDICTED HEART RATE: 165 BPM
MAX PREDICTED HEART RATE: 165 BPM
PROTOCOL NAME: NORMAL
PROTOCOL NAME: NORMAL
RATE PRESSURE PRODUCT: NORMAL
SL CV STRESS RECOVERY BP: NORMAL MMHG
SL CV STRESS RECOVERY HR: 111 BPM
SL CV STRESS RECOVERY O2 SAT: 97 %
SL CV STRESS STAGE REACHED: 3
STRESS ANGINA INDEX: 0
STRESS BASELINE BP: NORMAL MMHG
STRESS BASELINE HR: 78 BPM
STRESS DUKE TREADMILL SCORE: 9
STRESS O2 SAT REST: 97 %
STRESS PEAK HR: 157 BPM
STRESS POST ESTIMATED WORKLOAD: 10.1 METS
STRESS POST EXERCISE DUR MIN: 9 MIN
STRESS POST EXERCISE DUR SEC: 0 SEC
STRESS POST O2 SAT PEAK: 97 %
STRESS POST PEAK BP: 188 MMHG
STRESS POST PEAK HR: 164 BPM
STRESS POST PEAK HR: 164 BPM
STRESS POST PEAK SYSTOLIC BP: 190 MMHG
STRESS POST PEAK SYSTOLIC BP: 190 MMHG
STRESS ST DEPRESSION: 0 MM
STRESS ST ELEVATION: 0 MM
TARGET HR FORMULA: NORMAL
TARGET HR FORMULA: NORMAL
TEST INDICATION: NORMAL
TEST INDICATION: NORMAL

## 2025-07-01 PROCEDURE — 93018 CV STRESS TEST I&R ONLY: CPT | Performed by: INTERNAL MEDICINE

## 2025-07-01 PROCEDURE — 93016 CV STRESS TEST SUPVJ ONLY: CPT | Performed by: INTERNAL MEDICINE

## 2025-07-01 PROCEDURE — 93017 CV STRESS TEST TRACING ONLY: CPT

## 2025-07-14 ENCOUNTER — TELEPHONE (OUTPATIENT)
Age: 55
End: 2025-07-14

## 2025-07-14 NOTE — TELEPHONE ENCOUNTER
Patient wife calling with insurance new formulary they will need a prior authorization now on .     Insulin Disposable Pump (Omnipod 5 [id number] Pods Gen 5) MISC      Please advise.

## 2025-07-16 NOTE — TELEPHONE ENCOUNTER
PA for Omnipod 5 [id number] Pods Gen 5  APPROVED     Date(s) approved 7/16/25-7/16/26    Case #     Patient advised by          [x]Sparkbrowser[employer] Message  []Phone call   []LMOM  []L/M to call office as no active Communication consent on file  []Unable to leave detailed message as VM not approved on Communication consent       Pharmacy advised by    [x]Fax  []Phone call  []Secure Chat    Specialty Pharmacy    []      Approval letter scanned into Media No

## 2025-07-16 NOTE — TELEPHONE ENCOUNTER
PA for Omnipod 5 [id number] Pods Gen 5 SUBMITTED to [facility]    via    [x][employer]-Case ID # [id number]  [x]PA sent as URGENT    All office notes, labs and other pertaining documents and studies sent. Clinical questions answered. Awaiting determination from insurance company.     Turnaround time for your insurance to make a decision on your Prior Authorization can take 7-21 business days.

## 2025-07-25 DIAGNOSIS — E11.65 TYPE 2 DIABETES MELLITUS WITH HYPERGLYCEMIA, WITH LONG-TERM CURRENT USE OF INSULIN (HCC): ICD-10-CM

## 2025-07-25 DIAGNOSIS — I10 PRIMARY HYPERTENSION: ICD-10-CM

## 2025-07-25 DIAGNOSIS — E66.811 CLASS 1 OBESITY WITH BODY MASS INDEX (BMI) OF 30.0 TO 30.9 IN ADULT, UNSPECIFIED OBESITY TYPE, UNSPECIFIED WHETHER SERIOUS COMORBIDITY PRESENT: ICD-10-CM

## 2025-07-25 DIAGNOSIS — E11.69 TYPE 2 DIABETES MELLITUS WITH OTHER SPECIFIED COMPLICATION, WITH LONG-TERM CURRENT USE OF INSULIN (HCC): ICD-10-CM

## 2025-07-25 DIAGNOSIS — R80.9 MICROALBUMINURIA: ICD-10-CM

## 2025-07-25 DIAGNOSIS — Z79.4 TYPE 2 DIABETES MELLITUS WITH HYPERGLYCEMIA, WITH LONG-TERM CURRENT USE OF INSULIN (HCC): ICD-10-CM

## 2025-07-25 DIAGNOSIS — Z79.4 TYPE 2 DIABETES MELLITUS WITH OTHER SPECIFIED COMPLICATION, WITH LONG-TERM CURRENT USE OF INSULIN (HCC): ICD-10-CM

## 2025-07-25 DIAGNOSIS — E78.00 PURE HYPERCHOLESTEROLEMIA: ICD-10-CM

## 2025-07-25 RX ORDER — LISINOPRIL 5 MG/1
5 TABLET ORAL DAILY
Qty: 90 TABLET | Refills: 0 | Status: SHIPPED | OUTPATIENT
Start: 2025-07-25

## (undated) DEVICE — SPONGE SCRUB 4 PCT CHLORHEXIDINE

## (undated) DEVICE — PADDING CAST 4 IN  COTTON STRL

## (undated) DEVICE — SUT VICRYL 2-0 SH 27 IN UNDYED J417H

## (undated) DEVICE — WEBRIL 4IN X 4YDS

## (undated) DEVICE — Device

## (undated) DEVICE — SUT MONOCRYL 3-0 PS-2 18 IN Y497G

## (undated) DEVICE — ARM SLING: Brand: DEROYAL

## (undated) DEVICE — CAST PLASTER 4 IN ROLL FAST SET

## (undated) DEVICE — 3M™ IOBAN™ 2 ANTIMICROBIAL INCISE DRAPE 6650EZ: Brand: IOBAN™ 2

## (undated) DEVICE — OCCLUSIVE GAUZE STRIP,3% BISMUTH TRIBROMOPHENATE IN PETROLATUM BLEND: Brand: XEROFORM

## (undated) DEVICE — SUT VICRYL 2-0 CT-2 27 IN J269H

## (undated) DEVICE — GLOVE SRG BIOGEL 8

## (undated) DEVICE — CHLORAPREP HI-LITE 26ML ORANGE

## (undated) DEVICE — STOCKINETTE IMPERVIOUS

## (undated) DEVICE — GLOVE INDICATOR PI UNDERGLOVE SZ 8 BLUE

## (undated) DEVICE — INTENDED FOR TISSUE SEPARATION, AND OTHER PROCEDURES THAT REQUIRE A SHARP SURGICAL BLADE TO PUNCTURE OR CUT.: Brand: BARD-PARKER SAFETY BLADES SIZE 15, STERILE

## (undated) DEVICE — DRAPE C-ARM 48 X 84 IN F/ OEC MINIVIEW 6800

## (undated) DEVICE — ADHESIVE SKIN HIGH VISCOSITY EXOFIN 1ML

## (undated) DEVICE — PAD CAST 4 IN COTTON NON STERILE

## (undated) DEVICE — U-DRAPE: Brand: CONVERTORS

## (undated) DEVICE — TUBING SUCTION 5MM X 12 FT

## (undated) DEVICE — SUT 2 FIBERLOOP AR-7234

## (undated) DEVICE — PAD GROUNDING ADULT

## (undated) DEVICE — COBAN 4 IN STERILE

## (undated) DEVICE — ACE WRAP 4 IN UNSTERILE

## (undated) DEVICE — GAUZE SPONGES,USP TYPE VII GAUZE, 12 PLY: Brand: CURITY

## (undated) DEVICE — STERILE BETHLEHEM PLASTIC HAND: Brand: CARDINAL HEALTH

## (undated) DEVICE — INTENT TO BE USED WITH SUTURE MATERIAL FOR TISSUE CLOSURE: Brand: RICHARD-ALLAN® NEEDLE 1/2 CIRCLE TAPER

## (undated) DEVICE — 10FR FRAZIER SUCTION HANDLE: Brand: CARDINAL HEALTH

## (undated) DEVICE — GLOVE SRG BIOGEL ORTHOPEDIC 8

## (undated) DEVICE — NEPTUNE E-SEP SMOKE EVACUATION PENCIL, COATED, 70MM BLADE, PUSH BUTTON SWITCH: Brand: NEPTUNE E-SEP